# Patient Record
Sex: MALE | Race: WHITE | NOT HISPANIC OR LATINO | Employment: OTHER | URBAN - NONMETROPOLITAN AREA
[De-identification: names, ages, dates, MRNs, and addresses within clinical notes are randomized per-mention and may not be internally consistent; named-entity substitution may affect disease eponyms.]

---

## 2023-02-08 PROBLEM — G47.00 INSOMNIA: Status: ACTIVE | Noted: 2023-02-08

## 2023-02-08 PROBLEM — R42 DIZZINESS: Status: ACTIVE | Noted: 2023-02-08

## 2023-02-08 PROBLEM — E78.2 COMBINED HYPERLIPIDEMIA: Status: ACTIVE | Noted: 2023-02-08

## 2023-02-08 PROBLEM — N40.1 BENIGN PROSTATIC HYPERPLASIA WITH URINARY OBSTRUCTION AND OTHER LOWER URINARY TRACT SYMPTOMS: Status: ACTIVE | Noted: 2023-02-08

## 2023-02-08 PROBLEM — K21.9 ACID REFLUX DISEASE: Status: ACTIVE | Noted: 2023-02-08

## 2023-02-08 PROBLEM — M17.9 DJD (DEGENERATIVE JOINT DISEASE) OF KNEE: Status: ACTIVE | Noted: 2023-02-08

## 2023-02-08 PROBLEM — M19.90 OSTEOARTHRITIS: Status: ACTIVE | Noted: 2023-02-08

## 2023-02-08 PROBLEM — I48.91 AFIB (MULTI): Status: ACTIVE | Noted: 2023-02-08

## 2023-02-08 PROBLEM — M54.50 LEFT LOW BACK PAIN: Status: ACTIVE | Noted: 2023-02-08

## 2023-02-08 PROBLEM — N13.8 BENIGN PROSTATIC HYPERPLASIA WITH URINARY OBSTRUCTION AND OTHER LOWER URINARY TRACT SYMPTOMS: Status: ACTIVE | Noted: 2023-02-08

## 2023-02-08 PROBLEM — I10 BENIGN ESSENTIAL HYPERTENSION: Status: ACTIVE | Noted: 2023-02-08

## 2023-02-08 PROBLEM — N48.6 PEYRONIE'S DISEASE: Status: ACTIVE | Noted: 2023-02-08

## 2023-02-08 PROBLEM — R35.0 URINARY FREQUENCY: Status: ACTIVE | Noted: 2023-02-08

## 2023-02-08 PROBLEM — K22.70 BARRETT'S ESOPHAGUS: Status: ACTIVE | Noted: 2023-02-08

## 2023-02-08 PROBLEM — I10 HYPERTENSION: Status: ACTIVE | Noted: 2023-02-08

## 2023-02-08 PROBLEM — I49.9 ABNORMAL HEART RHYTHM: Status: ACTIVE | Noted: 2023-02-08

## 2023-02-08 PROBLEM — I63.9 CVA (CEREBRAL VASCULAR ACCIDENT) (MULTI): Status: ACTIVE | Noted: 2023-02-08

## 2023-02-08 PROBLEM — M25.519 SHOULDER PAIN: Status: ACTIVE | Noted: 2023-02-08

## 2023-02-08 PROBLEM — R00.2 HEART PALPITATIONS: Status: ACTIVE | Noted: 2023-02-08

## 2023-02-08 PROBLEM — N40.0 ENLARGED PROSTATE: Status: ACTIVE | Noted: 2023-02-08

## 2023-02-08 PROBLEM — R35.1 NOCTURIA: Status: ACTIVE | Noted: 2023-02-08

## 2023-02-08 PROBLEM — E78.5 HYPERLIPIDEMIA: Status: ACTIVE | Noted: 2023-02-08

## 2023-02-08 RX ORDER — ATORVASTATIN CALCIUM 40 MG/1
1 TABLET, FILM COATED ORAL DAILY
COMMUNITY
Start: 2022-11-03 | End: 2023-05-08 | Stop reason: SDUPTHER

## 2023-02-08 RX ORDER — HYDROGEN PEROXIDE 3 %
1 SOLUTION, NON-ORAL MISCELLANEOUS DAILY
Status: ON HOLD | COMMUNITY
End: 2024-01-23 | Stop reason: ENTERED-IN-ERROR

## 2023-02-08 RX ORDER — TAMSULOSIN HYDROCHLORIDE 0.4 MG/1
0.4 CAPSULE ORAL DAILY
COMMUNITY
Start: 2021-05-03 | End: 2024-05-03

## 2023-02-08 RX ORDER — BENZONATATE 100 MG/1
100 CAPSULE ORAL
COMMUNITY
Start: 2022-11-18 | End: 2023-10-08 | Stop reason: ENTERED-IN-ERROR

## 2023-02-08 RX ORDER — LOSARTAN POTASSIUM 50 MG/1
1 TABLET ORAL DAILY
COMMUNITY
End: 2023-06-19 | Stop reason: SDUPTHER

## 2023-02-08 RX ORDER — ASPIRIN 81 MG/1
1 TABLET ORAL DAILY
COMMUNITY
Start: 2022-11-03 | End: 2023-10-08 | Stop reason: ENTERED-IN-ERROR

## 2023-02-08 RX ORDER — KETOCONAZOLE 20 MG/G
1 CREAM TOPICAL 2 TIMES DAILY
COMMUNITY

## 2023-02-08 RX ORDER — KETOCONAZOLE 20 MG/ML
1 SHAMPOO, SUSPENSION TOPICAL
COMMUNITY

## 2023-02-27 LAB — PROSTATE SPECIFIC ANTIGEN,SCREEN: 1.49 NG/ML (ref 0–4)

## 2023-03-28 ENCOUNTER — OFFICE VISIT (OUTPATIENT)
Dept: PRIMARY CARE | Facility: CLINIC | Age: 73
End: 2023-03-28
Payer: MEDICARE

## 2023-03-28 VITALS
OXYGEN SATURATION: 98 % | WEIGHT: 193 LBS | DIASTOLIC BLOOD PRESSURE: 78 MMHG | SYSTOLIC BLOOD PRESSURE: 150 MMHG | BODY MASS INDEX: 27.69 KG/M2 | HEART RATE: 86 BPM

## 2023-03-28 DIAGNOSIS — I63.339 CEREBROVASCULAR ACCIDENT (CVA) DUE TO THROMBOSIS OF POSTERIOR CEREBRAL ARTERY, UNSPECIFIED BLOOD VESSEL LATERALITY (MULTI): Primary | ICD-10-CM

## 2023-03-28 DIAGNOSIS — I10 BENIGN ESSENTIAL HYPERTENSION: ICD-10-CM

## 2023-03-28 DIAGNOSIS — I48.91 ATRIAL FIBRILLATION, UNSPECIFIED TYPE (MULTI): ICD-10-CM

## 2023-03-28 DIAGNOSIS — E78.2 MIXED HYPERLIPIDEMIA: ICD-10-CM

## 2023-03-28 DIAGNOSIS — F41.9 ANXIETY: ICD-10-CM

## 2023-03-28 PROCEDURE — 1157F ADVNC CARE PLAN IN RCRD: CPT | Performed by: FAMILY MEDICINE

## 2023-03-28 PROCEDURE — 99214 OFFICE O/P EST MOD 30 MIN: CPT | Performed by: FAMILY MEDICINE

## 2023-03-28 PROCEDURE — 3078F DIAST BP <80 MM HG: CPT | Performed by: FAMILY MEDICINE

## 2023-03-28 PROCEDURE — 3077F SYST BP >= 140 MM HG: CPT | Performed by: FAMILY MEDICINE

## 2023-03-28 PROCEDURE — 1036F TOBACCO NON-USER: CPT | Performed by: FAMILY MEDICINE

## 2023-03-28 PROCEDURE — 1160F RVW MEDS BY RX/DR IN RCRD: CPT | Performed by: FAMILY MEDICINE

## 2023-03-28 PROCEDURE — 1159F MED LIST DOCD IN RCRD: CPT | Performed by: FAMILY MEDICINE

## 2023-03-28 RX ORDER — SERTRALINE HYDROCHLORIDE 25 MG/1
25 TABLET, FILM COATED ORAL DAILY
Qty: 30 TABLET | Refills: 11 | Status: SHIPPED | OUTPATIENT
Start: 2023-03-28 | End: 2023-04-10 | Stop reason: SDUPTHER

## 2023-03-28 RX ORDER — HYDROCHLOROTHIAZIDE 12.5 MG/1
12.5 TABLET ORAL DAILY
Qty: 30 TABLET | Refills: 5 | Status: SHIPPED | OUTPATIENT
Start: 2023-03-28 | End: 2023-09-18

## 2023-03-28 ASSESSMENT — PATIENT HEALTH QUESTIONNAIRE - PHQ9
1. LITTLE INTEREST OR PLEASURE IN DOING THINGS: NOT AT ALL
2. FEELING DOWN, DEPRESSED OR HOPELESS: NOT AT ALL
SUM OF ALL RESPONSES TO PHQ9 QUESTIONS 1 AND 2: 0

## 2023-03-28 NOTE — PROGRESS NOTES
Subjective   Patient ID: Bonifacio Hernandez is a 72 y.o. male who presents for Hypertension.    HPI as noted his blood pressure above 140 systolic well under 90 diastolic lately.  He has been some stressed.  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  He is able to pay play his guitar and he is otherwise his regular self.  He does have a loop recorder in place to assess for atrial fibs.  His mood has been off and we discussed RBA of SSRI for anxiety/depression  He is not noted any episodes of irregular or racing heartbeat or palpitations  Hyperlipidemia- is on a statin and a prudent diet.    Review of Systems denies chest pains palpitations cough shortness of breath heartburn or abdominal pain.    Objective   /78   Pulse 86   Wt 87.5 kg (193 lb)   SpO2 98%   BMI 27.69 kg/m²     Physical Exam no bruit heart is regular without murmur lungs are clear no edema in extremities    Assessment/Plan   Problem List Items Addressed This Visit          Nervous    CVA (cerebral vascular accident) (CMS/HCC) - Primary       Circulatory    Afib (CMS/[facility])    Benign essential hypertension    Relevant Medications    hydroCHLOROthiazide (HYDRODiuril) 12.5 mg tablet       Other    Hyperlipidemia     Other Visit Diagnoses       Anxiety        Relevant Medications    sertraline (Zoloft) 25 mg tablet        Agrees to begin sertraline 25 mg daily for anxiety  Add hydrochlorothiazide 12.5 daily to reduce systolic blood pressure  Keep April appointment to follow-up on both above and we will assess laboratory need at that time

## 2023-04-10 ENCOUNTER — OFFICE VISIT (OUTPATIENT)
Dept: PRIMARY CARE | Facility: CLINIC | Age: 73
End: 2023-04-10
Payer: MEDICARE

## 2023-04-10 VITALS
OXYGEN SATURATION: 97 % | WEIGHT: 188.6 LBS | DIASTOLIC BLOOD PRESSURE: 60 MMHG | HEART RATE: 72 BPM | HEIGHT: 70 IN | BODY MASS INDEX: 27 KG/M2 | SYSTOLIC BLOOD PRESSURE: 120 MMHG

## 2023-04-10 DIAGNOSIS — I63.339 CEREBROVASCULAR ACCIDENT (CVA) DUE TO THROMBOSIS OF POSTERIOR CEREBRAL ARTERY, UNSPECIFIED BLOOD VESSEL LATERALITY (MULTI): ICD-10-CM

## 2023-04-10 DIAGNOSIS — E78.2 COMBINED HYPERLIPIDEMIA: ICD-10-CM

## 2023-04-10 DIAGNOSIS — I48.0 PAROXYSMAL ATRIAL FIBRILLATION (MULTI): Primary | ICD-10-CM

## 2023-04-10 DIAGNOSIS — F41.9 ANXIETY: ICD-10-CM

## 2023-04-10 DIAGNOSIS — I10 BENIGN ESSENTIAL HYPERTENSION: ICD-10-CM

## 2023-04-10 LAB
ANION GAP IN SER/PLAS: 10 MMOL/L (ref 10–20)
BASOPHILS (10*3/UL) IN BLOOD BY AUTOMATED COUNT: 0.08 X10E9/L (ref 0–0.1)
BASOPHILS/100 LEUKOCYTES IN BLOOD BY AUTOMATED COUNT: 0.7 % (ref 0–2)
CALCIUM (MG/DL) IN SER/PLAS: 9.6 MG/DL (ref 8.6–10.3)
CARBON DIOXIDE, TOTAL (MMOL/L) IN SER/PLAS: 28 MMOL/L (ref 21–32)
CHLORIDE (MMOL/L) IN SER/PLAS: 101 MMOL/L (ref 98–107)
CREATININE (MG/DL) IN SER/PLAS: 0.93 MG/DL (ref 0.5–1.3)
EOSINOPHILS (10*3/UL) IN BLOOD BY AUTOMATED COUNT: 0.25 X10E9/L (ref 0–0.4)
EOSINOPHILS/100 LEUKOCYTES IN BLOOD BY AUTOMATED COUNT: 2.3 % (ref 0–6)
ERYTHROCYTE DISTRIBUTION WIDTH (RATIO) BY AUTOMATED COUNT: 12.4 % (ref 11.5–14.5)
ERYTHROCYTE MEAN CORPUSCULAR HEMOGLOBIN CONCENTRATION (G/DL) BY AUTOMATED: 32.9 G/DL (ref 32–36)
ERYTHROCYTE MEAN CORPUSCULAR VOLUME (FL) BY AUTOMATED COUNT: 94 FL (ref 80–100)
ERYTHROCYTES (10*6/UL) IN BLOOD BY AUTOMATED COUNT: 5.14 X10E12/L (ref 4.5–5.9)
GFR MALE: 86 ML/MIN/1.73M2
GLUCOSE (MG/DL) IN SER/PLAS: 105 MG/DL (ref 74–99)
HEMATOCRIT (%) IN BLOOD BY AUTOMATED COUNT: 48.3 % (ref 41–52)
HEMOGLOBIN (G/DL) IN BLOOD: 15.9 G/DL (ref 13.5–17.5)
IMMATURE GRANULOCYTES/100 LEUKOCYTES IN BLOOD BY AUTOMATED COUNT: 0.5 % (ref 0–0.9)
LEUKOCYTES (10*3/UL) IN BLOOD BY AUTOMATED COUNT: 10.9 X10E9/L (ref 4.4–11.3)
LYMPHOCYTES (10*3/UL) IN BLOOD BY AUTOMATED COUNT: 1.47 X10E9/L (ref 0.8–3)
LYMPHOCYTES/100 LEUKOCYTES IN BLOOD BY AUTOMATED COUNT: 13.5 % (ref 13–44)
MONOCYTES (10*3/UL) IN BLOOD BY AUTOMATED COUNT: 1.17 X10E9/L (ref 0.05–0.8)
MONOCYTES/100 LEUKOCYTES IN BLOOD BY AUTOMATED COUNT: 10.7 % (ref 2–10)
NEUTROPHILS (10*3/UL) IN BLOOD BY AUTOMATED COUNT: 7.9 X10E9/L (ref 1.6–5.5)
NEUTROPHILS/100 LEUKOCYTES IN BLOOD BY AUTOMATED COUNT: 72.3 % (ref 40–80)
PLATELETS (10*3/UL) IN BLOOD AUTOMATED COUNT: 244 X10E9/L (ref 150–450)
POTASSIUM (MMOL/L) IN SER/PLAS: 4.3 MMOL/L (ref 3.5–5.3)
SODIUM (MMOL/L) IN SER/PLAS: 135 MMOL/L (ref 136–145)
UREA NITROGEN (MG/DL) IN SER/PLAS: 16 MG/DL (ref 6–23)

## 2023-04-10 PROCEDURE — 3074F SYST BP LT 130 MM HG: CPT | Performed by: FAMILY MEDICINE

## 2023-04-10 PROCEDURE — 99214 OFFICE O/P EST MOD 30 MIN: CPT | Performed by: FAMILY MEDICINE

## 2023-04-10 PROCEDURE — 1159F MED LIST DOCD IN RCRD: CPT | Performed by: FAMILY MEDICINE

## 2023-04-10 PROCEDURE — 3078F DIAST BP <80 MM HG: CPT | Performed by: FAMILY MEDICINE

## 2023-04-10 PROCEDURE — 1036F TOBACCO NON-USER: CPT | Performed by: FAMILY MEDICINE

## 2023-04-10 PROCEDURE — 1157F ADVNC CARE PLAN IN RCRD: CPT | Performed by: FAMILY MEDICINE

## 2023-04-10 PROCEDURE — 1160F RVW MEDS BY RX/DR IN RCRD: CPT | Performed by: FAMILY MEDICINE

## 2023-04-10 RX ORDER — SERTRALINE HYDROCHLORIDE 50 MG/1
50 TABLET, FILM COATED ORAL DAILY
Qty: 30 TABLET | Refills: 11 | Status: SHIPPED | OUTPATIENT
Start: 2023-04-10 | End: 2024-04-05 | Stop reason: SDUPTHER

## 2023-04-10 NOTE — PROGRESS NOTES
"Subjective   Patient ID: Bonifacio Hernandez is a 72 y.o. male who presents for Follow-up (Check med).    HPI   Anxiety seems better after 2 weeks per pt, wife woth no diff, so inc to 50mg as meds are tolerated without nausea  No paf episodes noted.  Cva stable ?pba?  As he has some emotional lability suggested by wife the patient denies  HTN-Takes and tolerates meds without side effects. No alcohol. no tobacco. no exercise. low salt.  Reviewed recommendation for 150 minutes of exercise per week including 2 days of weight training if over age 50.  Tolerates hydrochlorothiazide with normotensive response we will check BMP today  Hyperlipidemia- is on a statin and a prudent diet.      Review of Systems  General-no fatigue weight to within 10 pounds  ENT no problems with vision swallowing  Cardiac no chest pains palpitations change in exercise tolerance or capacity  Pulmonary no cough shortness of breath  GI no heartburn or abdominal pain  Musculoskeletal no joint pains  Objective   /60   Pulse 72   Ht 1.778 m (5' 10\")   Wt 85.5 kg (188 lb 9.6 oz)   SpO2 97%   BMI 27.06 kg/m²     Physical Exam general:  Alert, No acute distress. Appears stated age  Eye:  Pupils are equal, round and reactive to light, Extraocular movements are intact, Normal conjunctiva.    Neck:  Supple, Non-tender, No carotid bruit, No jugular venous distention, No lymphadenopathy, No thyromegaly.    Respiratory:  Lungs are clear to auscultation, Respirations are non-labored, Breath sounds are equal.    Cardiovascular:  Normal rate, Regular rhythm, No murmur.    Gastrointestinal:  Soft, Non-tender, No organomegaly. No solid or pulsatile mass  Integumentary:  Warm, Dry. No concerning lesions on exposed areas  Neurologic:  Alert, Oriented.  Gross and fine motor intact, CN 2-12 intact  Psychiatric:  Cooperative, Appropriate mood & affect.    Assessment/Plan   Problem List Items Addressed This Visit          Nervous    CVA (cerebral vascular accident) " (CMS/HCC)       Circulatory    Afib (CMS/HCC) - Primary    Relevant Orders    Basic metabolic panel    Follow Up In Primary Care    Benign essential hypertension       Other    Combined hyperlipidemia     Other Visit Diagnoses       Anxiety        Relevant Medications    sertraline (Zoloft) 50 mg tablet    Other Relevant Orders    Follow Up In Primary Care

## 2023-05-08 DIAGNOSIS — E78.2 MIXED HYPERLIPIDEMIA: ICD-10-CM

## 2023-05-08 RX ORDER — ATORVASTATIN CALCIUM 40 MG/1
40 TABLET, FILM COATED ORAL DAILY
Qty: 90 TABLET | Refills: 3 | Status: SHIPPED | OUTPATIENT
Start: 2023-05-08 | End: 2024-05-21 | Stop reason: SDUPTHER

## 2023-06-19 DIAGNOSIS — I10 BENIGN ESSENTIAL HYPERTENSION: ICD-10-CM

## 2023-06-19 RX ORDER — LOSARTAN POTASSIUM 50 MG/1
50 TABLET ORAL DAILY
Qty: 30 TABLET | Refills: 0 | Status: SHIPPED | OUTPATIENT
Start: 2023-06-19 | End: 2023-07-14

## 2023-07-10 ENCOUNTER — OFFICE VISIT (OUTPATIENT)
Dept: PRIMARY CARE | Facility: CLINIC | Age: 73
End: 2023-07-10
Payer: MEDICARE

## 2023-07-10 VITALS
HEIGHT: 70 IN | WEIGHT: 184.6 LBS | HEART RATE: 71 BPM | BODY MASS INDEX: 26.43 KG/M2 | OXYGEN SATURATION: 97 % | SYSTOLIC BLOOD PRESSURE: 120 MMHG | DIASTOLIC BLOOD PRESSURE: 58 MMHG

## 2023-07-10 DIAGNOSIS — I63.339 CEREBROVASCULAR ACCIDENT (CVA) DUE TO THROMBOSIS OF POSTERIOR CEREBRAL ARTERY, UNSPECIFIED BLOOD VESSEL LATERALITY (MULTI): ICD-10-CM

## 2023-07-10 DIAGNOSIS — F41.9 ANXIETY: ICD-10-CM

## 2023-07-10 DIAGNOSIS — E78.2 MIXED HYPERLIPIDEMIA: ICD-10-CM

## 2023-07-10 DIAGNOSIS — K21.9 GASTROESOPHAGEAL REFLUX DISEASE WITHOUT ESOPHAGITIS: ICD-10-CM

## 2023-07-10 DIAGNOSIS — I10 BENIGN ESSENTIAL HYPERTENSION: Primary | ICD-10-CM

## 2023-07-10 DIAGNOSIS — I48.0 PAROXYSMAL ATRIAL FIBRILLATION (MULTI): ICD-10-CM

## 2023-07-10 PROCEDURE — 3078F DIAST BP <80 MM HG: CPT | Performed by: FAMILY MEDICINE

## 2023-07-10 PROCEDURE — 99214 OFFICE O/P EST MOD 30 MIN: CPT | Performed by: FAMILY MEDICINE

## 2023-07-10 PROCEDURE — 1036F TOBACCO NON-USER: CPT | Performed by: FAMILY MEDICINE

## 2023-07-10 PROCEDURE — 1159F MED LIST DOCD IN RCRD: CPT | Performed by: FAMILY MEDICINE

## 2023-07-10 PROCEDURE — 3074F SYST BP LT 130 MM HG: CPT | Performed by: FAMILY MEDICINE

## 2023-07-10 PROCEDURE — 1157F ADVNC CARE PLAN IN RCRD: CPT | Performed by: FAMILY MEDICINE

## 2023-07-10 PROCEDURE — 1160F RVW MEDS BY RX/DR IN RCRD: CPT | Performed by: FAMILY MEDICINE

## 2023-07-10 NOTE — PROGRESS NOTES
"Subjective   Patient ID: Bonifacio Hernandez is a 73 y.o. male who presents for Follow-up (3 mo).    HPI   Anxiety- anxiety is resollved. Npassed drivers test.  PAF- no episodes noted  HTN-Takes and tolerates meds without side effects. No alcohol. no tobacco. no exercise. low salt.  Reviewed recommendation for 150 minutes of exercise per week including 2 days of weight training if over age 50  Hyperlipidemia- is on a statin and a prudent diet.  GERD-Takes PPI daily with no breakthrough symptoms.  Reviewed dietary, caffeine, tobacco, alcohol, and NSAID avoidance. No dyspepsia, dysphagia, reflux, melena, or abdominal pain.  Cva no residua effect.    Review of Systems  General-no fatigue weight to within 10 pounds  ENT no problems with vision swallowing  Cardiac no chest pains palpitations change in exercise tolerance or capacity  Pulmonary no cough shortness of breath  GI no heartburn or abdominal pain  Musculoskeletal right knee joint pains    Objective   /58   Pulse 71   Ht 1.778 m (5' 10\")   Wt 83.7 kg (184 lb 9.6 oz)   SpO2 97%   BMI 26.49 kg/m²     Physical Exam  General:  Alert, No acute distress. Appears stated age  Eye:  Pupils are equal, round and reactive to light, Extraocular movements are intact, Normal conjunctiva.    Neck:  Supple, Non-tender, No carotid bruit, No jugular venous distention, No lymphadenopathy, No thyromegaly.    Respiratory:  Lungs are clear to auscultation, Respirations are non-labored, Breath sounds are equal.    Cardiovascular:  Normal rate, Regular rhythm, No murmur.    Gastrointestinal:  Soft, Non-tender, No organomegaly. No solid or pulsatile mass  Integumentary:  Warm, Dry. No concerning lesions on exposed areas  Neurologic:  Alert, Oriented.  Gross and fine motor intact, CN 2-12 intact  Psychiatric:  Cooperative, Appropriate mood & affect.    Assessment/Plan   Problem List Items Addressed This Visit          Cardiac and Vasculature    Afib (CMS/[facility])    Relevant Orders    " Follow Up In Primary Care - Established    Benign essential hypertension - Primary    Relevant Orders    CBC    Comprehensive Metabolic Panel    Follow Up In Primary Care - Established    Hyperlipidemia    Relevant Orders    CBC    Comprehensive Metabolic Panel    Lipid Panel    Follow Up In Primary Care - Established       Gastrointestinal and Abdominal    Acid reflux disease    Relevant Orders    Follow Up In Primary Care - Established       Neuro    CVA (cerebral vascular accident) (CMS/HCC)    Relevant Orders    Follow Up In Primary Care - Established     Other Visit Diagnoses       Anxiety        Relevant Orders    Follow Up In Primary Care - Established

## 2023-07-12 DIAGNOSIS — I10 BENIGN ESSENTIAL HYPERTENSION: ICD-10-CM

## 2023-07-14 RX ORDER — LOSARTAN POTASSIUM 50 MG/1
50 TABLET ORAL DAILY
Qty: 90 TABLET | Refills: 3 | Status: SHIPPED | OUTPATIENT
Start: 2023-07-14 | End: 2024-07-13

## 2023-09-05 DIAGNOSIS — I10 BENIGN ESSENTIAL HYPERTENSION: ICD-10-CM

## 2023-09-18 RX ORDER — HYDROCHLOROTHIAZIDE 12.5 MG/1
12.5 TABLET ORAL DAILY
Qty: 90 TABLET | Refills: 3 | Status: SHIPPED | OUTPATIENT
Start: 2023-09-18 | End: 2023-09-19 | Stop reason: SDUPTHER

## 2023-09-19 DIAGNOSIS — I10 BENIGN ESSENTIAL HYPERTENSION: ICD-10-CM

## 2023-09-19 RX ORDER — HYDROCHLOROTHIAZIDE 12.5 MG/1
12.5 TABLET ORAL DAILY
Qty: 90 TABLET | Refills: 3 | Status: SHIPPED | OUTPATIENT
Start: 2023-09-19 | End: 2024-09-18

## 2023-10-08 ENCOUNTER — APPOINTMENT (OUTPATIENT)
Dept: RADIOLOGY | Facility: HOSPITAL | Age: 73
End: 2023-10-08
Payer: MEDICARE

## 2023-10-08 ENCOUNTER — HOSPITAL ENCOUNTER (EMERGENCY)
Facility: HOSPITAL | Age: 73
Discharge: HOME | End: 2023-10-08
Attending: EMERGENCY MEDICINE
Payer: MEDICARE

## 2023-10-08 VITALS
OXYGEN SATURATION: 98 % | SYSTOLIC BLOOD PRESSURE: 133 MMHG | WEIGHT: 184 LBS | BODY MASS INDEX: 26.34 KG/M2 | HEART RATE: 59 BPM | RESPIRATION RATE: 16 BRPM | TEMPERATURE: 96.7 F | HEIGHT: 70 IN | DIASTOLIC BLOOD PRESSURE: 75 MMHG

## 2023-10-08 DIAGNOSIS — S61.012A THUMB LACERATION, LEFT, INITIAL ENCOUNTER: ICD-10-CM

## 2023-10-08 DIAGNOSIS — S02.2XXA CLOSED FRACTURE OF NASAL BONE, INITIAL ENCOUNTER: ICD-10-CM

## 2023-10-08 DIAGNOSIS — S09.90XA HEAD INJURY, INITIAL ENCOUNTER: ICD-10-CM

## 2023-10-08 DIAGNOSIS — R42 DIZZINESS: Primary | ICD-10-CM
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
## 2023-10-08 LAB
ALBUMIN SERPL BCP-MCNC: 4.1 G/DL (ref 3.4–5)
ALP SERPL-CCNC: 51 U/L (ref 33–136)
ALT SERPL W P-5'-P-CCNC: 27 U/L (ref 10–52)
ANION GAP SERPL CALC-SCNC: 11 MMOL/L (ref 10–20)
AST SERPL W P-5'-P-CCNC: 27 U/L (ref 9–39)
BASOPHILS # BLD AUTO: 0.04 X10*3/UL (ref 0–0.1)
BASOPHILS NFR BLD AUTO: 0.4 %
BILIRUB SERPL-MCNC: 0.5 MG/DL (ref 0–1.2)
BUN SERPL-MCNC: 12 MG/DL (ref 6–23)
CALCIUM SERPL-MCNC: 9.5 MG/DL (ref 8.6–10.3)
CARDIAC TROPONIN I PNL SERPL HS: 6 NG/L (ref 0–20)
CARDIAC TROPONIN I PNL SERPL HS: 6 NG/L (ref 0–20)
CHLORIDE SERPL-SCNC: 100 MMOL/L (ref 98–107)
CO2 SERPL-SCNC: 27 MMOL/L (ref 21–32)
CREAT SERPL-MCNC: 0.76 MG/DL (ref 0.5–1.3)
EOSINOPHIL # BLD AUTO: 0.1 X10*3/UL (ref 0–0.4)
EOSINOPHIL NFR BLD AUTO: 1 %
ERYTHROCYTE [DISTWIDTH] IN BLOOD BY AUTOMATED COUNT: 12.6 % (ref 11.5–14.5)
GFR SERPL CREATININE-BSD FRML MDRD: >90 ML/MIN/1.73M*2
GLUCOSE SERPL-MCNC: 132 MG/DL (ref 74–99)
HCT VFR BLD AUTO: 44 % (ref 41–52)
HGB BLD-MCNC: 14.5 G/DL (ref 13.5–17.5)
IMM GRANULOCYTES # BLD AUTO: 0.04 X10*3/UL (ref 0–0.5)
IMM GRANULOCYTES NFR BLD AUTO: 0.4 % (ref 0–0.9)
LYMPHOCYTES # BLD AUTO: 0.8 X10*3/UL (ref 0.8–3)
LYMPHOCYTES NFR BLD AUTO: 7.9 %
MCH RBC QN AUTO: 30.1 PG (ref 26–34)
MCHC RBC AUTO-ENTMCNC: 33 G/DL (ref 32–36)
MCV RBC AUTO: 91 FL (ref 80–100)
MONOCYTES # BLD AUTO: 0.79 X10*3/UL (ref 0.05–0.8)
MONOCYTES NFR BLD AUTO: 7.8 %
NEUTROPHILS # BLD AUTO: 8.4 X10*3/UL (ref 1.6–5.5)
NEUTROPHILS NFR BLD AUTO: 82.5 %
NRBC BLD-RTO: 0 /100 WBCS (ref 0–0)
PLATELET # BLD AUTO: 217 X10*3/UL (ref 150–450)
PMV BLD AUTO: 9.8 FL (ref 7.5–11.5)
POTASSIUM SERPL-SCNC: 3.9 MMOL/L (ref 3.5–5.3)
PROT SERPL-MCNC: 6.7 G/DL (ref 6.4–8.2)
RBC # BLD AUTO: 4.82 X10*6/UL (ref 4.5–5.9)
SODIUM SERPL-SCNC: 134 MMOL/L (ref 136–145)
WBC # BLD AUTO: 10.2 X10*3/UL (ref 4.4–11.3)

## 2023-10-08 PROCEDURE — 76377 3D RENDER W/INTRP POSTPROCES: CPT | Performed by: RADIOLOGY

## 2023-10-08 PROCEDURE — 90471 IMMUNIZATION ADMIN: CPT | Performed by: EMERGENCY MEDICINE

## 2023-10-08 PROCEDURE — 72125 CT NECK SPINE W/O DYE: CPT | Performed by: RADIOLOGY

## 2023-10-08 PROCEDURE — 90715 TDAP VACCINE 7 YRS/> IM: CPT | Performed by: EMERGENCY MEDICINE

## 2023-10-08 PROCEDURE — 70450 CT HEAD/BRAIN W/O DYE: CPT | Mod: ME

## 2023-10-08 PROCEDURE — 84484 ASSAY OF TROPONIN QUANT: CPT | Mod: 59 | Performed by: NURSE PRACTITIONER

## 2023-10-08 PROCEDURE — G1004 CDSM NDSC: HCPCS

## 2023-10-08 PROCEDURE — 70450 CT HEAD/BRAIN W/O DYE: CPT | Performed by: RADIOLOGY

## 2023-10-08 PROCEDURE — 99285 EMERGENCY DEPT VISIT HI MDM: CPT | Mod: 25 | Performed by: EMERGENCY MEDICINE

## 2023-10-08 PROCEDURE — 36415 COLL VENOUS BLD VENIPUNCTURE: CPT | Performed by: NURSE PRACTITIONER

## 2023-10-08 PROCEDURE — 2500000004 HC RX 250 GENERAL PHARMACY W/ HCPCS (ALT 636 FOR OP/ED): Performed by: EMERGENCY MEDICINE

## 2023-10-08 PROCEDURE — 70486 CT MAXILLOFACIAL W/O DYE: CPT | Mod: MG

## 2023-10-08 PROCEDURE — 71046 X-RAY EXAM CHEST 2 VIEWS: CPT | Performed by: RADIOLOGY

## 2023-10-08 PROCEDURE — 93005 ELECTROCARDIOGRAM TRACING: CPT

## 2023-10-08 PROCEDURE — 84484 ASSAY OF TROPONIN QUANT: CPT | Performed by: NURSE PRACTITIONER

## 2023-10-08 PROCEDURE — 85025 COMPLETE CBC W/AUTO DIFF WBC: CPT | Performed by: NURSE PRACTITIONER

## 2023-10-08 PROCEDURE — 80053 COMPREHEN METABOLIC PANEL: CPT | Performed by: NURSE PRACTITIONER

## 2023-10-08 PROCEDURE — 70486 CT MAXILLOFACIAL W/O DYE: CPT | Performed by: RADIOLOGY

## 2023-10-08 PROCEDURE — 71046 X-RAY EXAM CHEST 2 VIEWS: CPT

## 2023-10-08 RX ORDER — LATANOPROST 50 UG/ML
1 SOLUTION/ DROPS OPHTHALMIC NIGHTLY
COMMUNITY

## 2023-10-08 RX ORDER — MULTIVITAMIN
1 TABLET ORAL DAILY
Status: ON HOLD | COMMUNITY
End: 2024-01-23 | Stop reason: ENTERED-IN-ERROR

## 2023-10-08 RX ORDER — ACETAMINOPHEN 500 MG
2 TABLET ORAL DAILY
Status: ON HOLD | COMMUNITY
End: 2024-01-23 | Stop reason: ENTERED-IN-ERROR

## 2023-10-08 RX ORDER — AMOXICILLIN 500 MG/1
500 CAPSULE ORAL 3 TIMES DAILY
Qty: 15 CAPSULE | Refills: 0 | Status: SHIPPED | OUTPATIENT
Start: 2023-10-08 | End: 2023-10-20

## 2023-10-08 RX ADMIN — TETANUS TOXOID, REDUCED DIPHTHERIA TOXOID AND ACELLULAR PERTUSSIS VACCINE, ADSORBED 0.5 ML: 5; 2.5; 8; 8; 2.5 SUSPENSION INTRAMUSCULAR at 19:20

## 2023-10-08 ASSESSMENT — PAIN SCALES - GENERAL
PAINLEVEL_OUTOF10: 0 - NO PAIN

## 2023-10-08 ASSESSMENT — PAIN - FUNCTIONAL ASSESSMENT
PAIN_FUNCTIONAL_ASSESSMENT: 0-10
PAIN_FUNCTIONAL_ASSESSMENT: 0-10

## 2023-10-08 ASSESSMENT — COLUMBIA-SUICIDE SEVERITY RATING SCALE - C-SSRS
2. HAVE YOU ACTUALLY HAD ANY THOUGHTS OF KILLING YOURSELF?: NO
1. IN THE PAST MONTH, HAVE YOU WISHED YOU WERE DEAD OR WISHED YOU COULD GO TO SLEEP AND NOT WAKE UP?: NO
6. HAVE YOU EVER DONE ANYTHING, STARTED TO DO ANYTHING, OR PREPARED TO DO ANYTHING TO END YOUR LIFE?: NO

## 2023-10-08 NOTE — ED NOTES
Small abrasion noted to top of nose. Patient reports this is from his fall. Bleeding controlled.      Jane Arzola RN  10/08/23 [id number]

## 2023-10-08 NOTE — ED PROVIDER NOTES
Chief Complaint   Patient presents with    Fall     Pt was feeling dizzy today and he went to get up out of the chair when he became more dizzy and nauseous. Fell and hit head on the floor. Small laceration to bridge of nose. Bleeding controlled at this time. Denies LOC. Pt on Eliquis. Hx of ischemic stroke about 1 yr ago.         Patient History    Past Medical History:   Diagnosis Date    Ischemic stroke (CMS/HCC)     Personal history of other diseases of the circulatory system 09/02/2021    History of cardiac arrhythmia      Past Surgical History:   Procedure Laterality Date    OTHER SURGICAL HISTORY  09/16/2019    Varicose vein ligation    OTHER SURGICAL HISTORY  09/16/2019    Inguinal hernia repair    OTHER SURGICAL HISTORY  09/16/2019    Esophagogastroduodenoscopy    OTHER SURGICAL HISTORY  09/16/2019    Knee replacement    OTHER SURGICAL HISTORY  10/15/2019    Back surgery    OTHER SURGICAL HISTORY  08/28/2020    Colonoscopy    OTHER SURGICAL HISTORY  01/2023    Loop monitor      Family History   Problem Relation Name Age of Onset    Emphysema Father      Hypertension Sister      Pulmonary embolism Sister        Social History     Social History Narrative    Not on file      Allergies   Allergen Reactions    Adhesive Tape-Silicones Other    Bee Pollen Other    Lisinopril Cough        PMH: Reviewed  PSH: Reviewed  Social History: Reviewed.   Allergies reviewed.     HPI: This is a 73 year old male with history of stroke, arrhythmia on Eliquis, who presents to the ED today accompanied by his wife with complaints of fall, facial laceration.  He states he was sitting in his chair at home.  He started to feel nauseated and somewhat dizzy so he felt like he needed to stand up.  When he stood up, he got very dizzy and fell forward.  He was wearing glasses, hit his face on the floor, possibly on a side table, causing laceration on the bridge of his nose.  He denies LOC.  His wife was in the room with him and also  denies LOC.  He complains of no pain at this time.  Denies any chest pain or palpitations prior to or after the fall.  Does not recall his last tetanus update.      REVIEW OF SYSTEMS:  All other systems reviewed and negative except as listed in HPI.    PHYSICAL EXAM:    GENERAL: Vitals noted, no distress. Alert and oriented x 3. Non-toxic.      EENT: TMs clear, no hemotympanum, jeffers sign, raccoon eyes. Posterior oropharynx unremarkable. EOMI, no nystagmus noted.  Laceration on the nasal bridge.  Facial bones are nontender to palpation.    NECK: Supple. No masses. No midline tenderness. No meningeal signs.     CARDIAC: Regular rate, rhythm. No murmurs rubs or gallops. No JVD.    PULMONARY: Lungs clear and equal bilaterally. No wheezes rales or rhonchi. No respiratory distress.     ABDOMEN: Soft, nondistended, and nontender. No peritoneal signs. Bowel sounds are present and normoactive in all 4 quadrants. No pulsatile masses.     EXTREMITIES: No peripheral edema.     SKIN: No rash. Warm, dry, and intact. Nasal bridge laceration 2.5cm. Left thumb laceration 2cm.     NEURO: No focal neurologic deficits.     Labs Reviewed   CBC WITH AUTO DIFFERENTIAL - Abnormal       Result Value    WBC 10.2      nRBC 0.0      RBC 4.82      Hemoglobin 14.5      Hematocrit 44.0      MCV 91      MCH 30.1      MCHC 33.0      RDW 12.6      Platelets 217      MPV 9.8      Neutrophils % 82.5      Immature Granulocytes %, Automated 0.4      Lymphocytes % 7.9      Monocytes % 7.8      Eosinophils % 1.0      Basophils % 0.4      Neutrophils Absolute 8.40 (*)     Immature Granulocytes Absolute, Automated 0.04      Lymphocytes Absolute 0.80      Monocytes Absolute 0.79      Eosinophils Absolute 0.10      Basophils Absolute 0.04     COMPREHENSIVE METABOLIC PANEL - Abnormal    Glucose 132 (*)     Sodium 134 (*)     Potassium 3.9      Chloride 100      Bicarbonate 27      Anion Gap 11      Urea Nitrogen 12      Creatinine 0.76      eGFR >90       Calcium 9.5      Albumin 4.1      Alkaline Phosphatase 51      Total Protein 6.7      AST 27      Bilirubin, Total 0.5      ALT 27     SERIAL TROPONIN-INITIAL - Normal    Troponin I, High Sensitivity 6      Narrative:     Less than 99th percentile of normal range cutoff-  Female and children under 18 years old <14 ng/L; Male <21 ng/L: Negative  Repeat testing should be performed if clinically indicated.     Female and children under 18 years old 14-50 ng/L; Male 21-50 ng/L:  Consistent with possible cardiac damage and possible increased clinical   risk. Serial measurements may help to assess extent of myocardial damage.     >50 ng/L: Consistent with cardiac damage, increased clinical risk and  myocardial infarction. Serial measurements may help assess extent of   myocardial damage.      NOTE: Children less than 1 year old may have higher baseline troponin   levels and results should be interpreted in conjunction with the overall   clinical context.     NOTE: Troponin I testing is performed using a different   testing methodology at [facility] than at [facility]. Direct result comparisons should only   be made within the same method.   SERIAL TROPONIN, 1 HOUR - Normal    Troponin I, High Sensitivity 6      Narrative:     Less than 99th percentile of normal range cutoff-  Female and children under 18 years old <14 ng/L; Male <21 ng/L: Negative  Repeat testing should be performed if clinically indicated.     Female and children under 18 years old 14-50 ng/L; Male 21-50 ng/L:  Consistent with possible cardiac damage and possible increased clinical   risk. Serial measurements may help to assess extent of myocardial damage.     >50 ng/L: Consistent with cardiac damage, increased clinical risk and  myocardial infarction. Serial measurements may help assess extent of   myocardial damage.      NOTE: Children less than 1 year old may have higher baseline troponin   levels and results should be  interpreted in conjunction with the overall   clinical context.     NOTE: Troponin I testing is performed using a different   testing methodology at [facility] than at other   [facility]. Direct result comparisons should only   be made within the same method.   TROPONIN SERIES- (INITIAL, 1 HR)    Narrative:     The following orders were created for panel order Troponin I Series, High Sensitivity (0, 1 HR).  Procedure                               Abnormality         Status                     ---------                               -----------         ------                     Troponin I, High Sensiti...[[id number]]  Normal              Final result               Troponin, High Sensitivi...[[id number]]  Normal              Final result                 Please view results for these tests on the individual orders.        CT cervical spine wo IV contrast   Final Result   Advanced arthritis. No acute fracture.        MACRO:   None        Signed by: Ugo Cabral 10/8/2023 5:55 PM   Dictation workstation:   [id number]      CT maxillofacial bones wo IV contrast   Final Result   Fractures of the right and left nasal bones.        MACRO:   None        Signed by: Ugo Cabral 10/8/2023 6:08 PM   Dictation workstation:   [id number]      CT head wo IV contrast   Final Result   No acute findings. Encephalomalacia        Signed by: Ugo Cabral 10/8/2023 5:50 PM   Dictation workstation:   [id number]      XR chest 2 views   Final Result   1.  Linear atelectasis left lung base. Consider decubitus views of   the abdomen if the patient has abdominal pain                  MACRO:   None        Signed by: Ugo Cabral 10/8/2023 6:14 PM   Dictation workstation:   [id number]      CT 3D reconstruction    (Results Pending)        Medical Decision Making  Amount and/or Complexity of Data Reviewed  Labs: ordered. Decision-making details documented in ED Course.  Radiology: ordered. Decision-making  details documented in ED Course.  ECG/medicine tests:  Decision-making details documented in ED Course.    EKG interpreted by myself and confirmed by ED attending shows SB with rate of 59. Normal axis. UT interval 160. QRS interval 96. QT interval 454. QTc interval 449. No acute ischemia or injury pattern.       ED COURSE: This patient was seen and examined by myself and Dr. Coburn.  Patient was placed on a cardiac monitor with history of dizziness prior to his fall.  He declines tetanus update today initially then changed his mind and it was ordered.     He was set for suture repair. His face is draped in a sterile fashion and the site was cleansed using surgical scrub. He was anesthetized with 1% lidocaine. The wound was copiously irrigated using normal saline. It was explored under a bloodless field. No foreign bodies, no tendon or bony involvement are noted. He had a total of 5 simple interrupted sutures placed using 6.0 ethilon. The wound edges are well approximated post-closure. He has bacitracin/adaptic/gauze dressing applied. He is educated on scar formation and wound care. Sutures are to come out in 5 days by primary care or by returning to the ED. Pt tolerated the suturing well.     He was then set for suture repair of the left thumb. His left thumb is draped in a sterile fashion and the site was cleansed using surgical scrub. He was anesthetized with 1% lidocaine. The wound was copiously irrigated using normal saline. It was explored under a bloodless field. No foreign bodies, no tendon or bony involvement are noted. He had a total of 3 simple interrupted sutures placed using 6.0 ethilon. The wound edges are well approximated post-closure. He has bacitracin/adaptic/gauze dressing applied. He is educated on scar formation and wound care. Sutures are to come out in 7-10 days by primary care or by returning to the ED. Pt tolerated the suturing well.     Started on amoxicillin for nasal fracture and referred  to ENT. Recommended OTC tylenol for pain control as needed. He is discharged home in a stable condition with computer instructions given and is encouraged to return to the ER for any new or worsening symptoms.                  Differential Diagnoses Considered: Head bleed, skull fracture, nasal fracture    Chronic Medical Conditions Significantly Affecting Care: see above      Diagnostic testing considered: blood, cxr, EKG, ct head/facial bones/c-spine    Escalation of Care: Appropriate for outpatient management    Prescription Drug Consideration: amoxicillin        DIAGNOSTIC IMPRESSION: #1 fall #2 nasal fracture #3 facial laceration #4 left thumb laceration     Anali Vera, LOLA-YOLANDA  10/08/23 2004

## 2023-10-09 ENCOUNTER — APPOINTMENT (OUTPATIENT)
Dept: PRIMARY CARE | Facility: CLINIC | Age: 73
End: 2023-10-09
Payer: MEDICARE

## 2023-10-16 ENCOUNTER — APPOINTMENT (OUTPATIENT)
Dept: PRIMARY CARE | Facility: CLINIC | Age: 73
End: 2023-10-16
Payer: MEDICARE

## 2023-10-16 DIAGNOSIS — I48.91 ATRIAL FIBRILLATION, UNSPECIFIED TYPE (MULTI): ICD-10-CM

## 2023-10-19 NOTE — PROGRESS NOTES
"Cardiology Subsequent Encounter Clinic Note  Name: Bonifacio Hernandez  MRN: [id number]  : 1950    CC: Atrial fibrillation    Active Issues:  Bonifacio Hernandez is a 73 y.o. 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   -Subsequent loop recorder noted atrial fibrillation for which the patient was initiated on Eliquis 2.5 mg twice daily     Problem #2 hypertension     Problem #3 hyperlipidemia  - on atorvastatin 80 mg     Since his stroke he denies any chest discomfort or shortness of breath. Denies any orthopnea/PND/lower extremity edema.  2 weeks ago patient had a dizzy spell; was sitting in a chair and he tried to get up at which point he fell and \"hit and broke my nose\".  Was evaluated in the emergency room regarding the same.  Reports that he has about 1 dizzy spell a month.      Past Medical History  Past Medical History:   Diagnosis Date    Ischemic stroke (CMS/[facility]ly [facility])     Personal history of other diseases of the circulatory system 2021    History of cardiac arrhythmia       Past Surgical History  Past Surgical History:   Procedure Laterality Date    OTHER SURGICAL HISTORY  2019    Varicose vein ligation    OTHER SURGICAL HISTORY  2019    Inguinal hernia repair    OTHER SURGICAL HISTORY  2019    Esophagogastroduodenoscopy    OTHER SURGICAL HISTORY  2019    Knee replacement    OTHER SURGICAL HISTORY  10/15/2019    Back surgery    OTHER SURGICAL HISTORY  2020    Colonoscopy    OTHER SURGICAL HISTORY  2023    Loop monitor       Medications  Current Outpatient Medications on File Prior to Visit   Medication Sig Dispense Refill    apixaban (Eliquis) 2.5 mg tablet Take 1 tablet (2.5 mg) by mouth 2 times a day.      atorvastatin (Lipitor) 40 mg tablet Take 1 tablet (40 mg) by mouth once daily. " "90 tablet 3    calcium carbonate-vitamin D3 600 mg-20 mcg (800 unit) tablet Take 2 tablets by mouth once daily.      esomeprazole (NexIUM) 20 mg DR capsule Take 1 capsule (20 mg) by mouth once daily.      hydroCHLOROthiazide (HYDRODiuril) 12.5 mg tablet Take 1 tablet (12.5 mg) by mouth once daily. 90 tablet 3    ketoconazole (NIZOral) 2 % cream Apply 1 Application topically 2 times a day.      ketoconazole (NIZOral) 2 % shampoo Apply 1 Application topically every other day.      latanoprost (Xalatan) 0.005 % ophthalmic solution Administer 1 drop into both eyes once daily at bedtime.      losartan (Cozaar) 50 mg tablet Take 1 tablet (50 mg) by mouth once daily. 90 tablet 3    multivitamin tablet Take 1 tablet by mouth once daily.      sertraline (Zoloft) 50 mg tablet Take 1 tablet (50 mg) by mouth once daily. 30 tablet 11    tamsulosin (Flomax) 0.4 mg 24 hr capsule Take by mouth.      [DISCONTINUED] amoxicillin (Amoxil) 500 mg capsule Take 1 capsule (500 mg) by mouth 3 times a day for 5 days. 15 capsule 0     No current facility-administered medications on file prior to visit.       Allergies  Allergies   Allergen Reactions    Adhesive Tape-Silicones Other    Bee Pollen Other    Lisinopril Cough       Social History  Social History     Tobacco Use    Smoking status: Never    Smokeless tobacco: Never   Vaping Use    Vaping Use: Never used   Substance Use Topics    Alcohol use: Never    Drug use: Never       Family History  Family History   Problem Relation Name Age of Onset    Emphysema Father      Hypertension Sister      Pulmonary embolism Sister         Physical Examination  Vitals: /70   Pulse 78   Ht 1.778 m (5' 10\")   Wt 83 kg (183 lb)   SpO2 98%   BMI 26.26 kg/m²   General: awake, alert and oriented. No acute distress.   Skin: Skin is warm, dry and intact without rashes or lesions. Appropriate color for ethnicity. Nail beds pink with no cyanosis or clubbing  HEENT: normocephalic, atraumatic; " conjunctivae are clear without exudates or hemorrhage. Sclera is non-icteric. Eyelids are normal in appearance without swelling or lesions. Hearing intact. Nares are patent bilaterally. Moist mucous membranes.   Cardiovascular: Regular. No murmurs, gallops, or rubs are auscultated. S1 and S2 are heard and are of normal intensity. No JVD, no carotid bruits  Respiratory: Thorax symmetric. CTAB, breath sounds vesicular. No crackles, wheezes or ronchi.   Gastrointestinal: soft, non-distended, BS + x 4  Genitourinary: exam deferred  Musculoskeletal: moves all extremities  Extremities: pulses palpable bilaterally; no swelling or erythema; no edema  Neurological: alert & oriented x 3; no focal deficits  Psychiatric: appropriate mood and affect       Labs/Imaging/Procedures    Lab Results   Component Value Date    HGB 14.5 10/08/2023    HGB 15.9 04/10/2023    HGB 14.3 11/01/2022    HGB 15.5 10/31/2022     10/08/2023    WBC 10.2 10/08/2023     (L) 10/08/2023    K 3.9 10/08/2023    CREATININE 0.76 10/08/2023    CREATININE 0.93 04/10/2023    CREATININE 0.73 11/01/2022    CREATININE 0.76 10/31/2022    BUN 12 10/08/2023    CALCIUM 9.5 10/08/2023    INR 1.1 10/31/2022    TROPHS 6 10/08/2023    TROPHS 6 10/08/2023    TROPHS 7 10/31/2022    LDLF 80 11/01/2022     No echocardiogram results found for the past 12 months  CT maxillofacial bones wo IV contrast, CT 3D reconstruction  Narrative: Interpreted By:  Ugo Cabral,   STUDY:  CT FACIAL BONES WO IV CONTRAST  10/8/2023 5:13 pm      INDICATION:  Signs/Symptoms:fall      COMPARISON:  None.      ACCESSION NUMBER(S):  [id number]      ORDERING CLINICIAN:  ASPEN CORBIN      TECHNIQUE:  Thin cut axial CT images through the facial bones were obtained and  reconstructed in the coronal  and sagittal plane. 3D reconstruction  in the sagittal, axial and coronal plane created on a separate  workstation and 3D model.      FINDINGS:  Bilateral carotid bulb  calcifications.      Comminuted fracture of the right left nasal bones with soft tissue  swelling. Nasal septal thickening. Nasal septal hematoma can not be  excluded.      Orbits are normal.      Impression: Fractures of the right and left nasal bones.      MACRO:  None      Signed by: Ugo Cabral 10/8/2023 6:08 PM  Dictation workstation:   [id number]    Echocardiogram November 2022:  CONCLUSIONS:  1. Left ventricular systolic function is normal with a 55-60% estimated ejection fraction.  2. Spectral Doppler shows an impaired relaxation pattern of left ventricular diastolic filling.    Impression  Bonifacio Hernandez is a 73 y.o. male   with a medical history of hypertension, hyperlipidemia, abnormal event monitor here to establish care regarding the following conditions:     Problem #1 recent CVA  -Posterior MCA CVA without lasting neurological deficits October 2022.   -Subsequent loop recorder noted atrial fibrillation for which the patient was initiated on Eliquis 2.5 mg twice daily     Problem #2 hypertension     Problem #3 hyperlipidemia  - on atorvastatin 80 mg     Plan:  -Given his very significant fall and episodes of dizziness we did discuss left atrial appendage occluder devices.  He is agreeable to having a conversation regarding the same.  We will refer him to the structural team for consideration for a Watchman device  -Continue atorvastatin 80 mg at this time.    -Sinus rhythm in clinic today.  RTC 1 year                                  Shared Decision Tool - Referral for Left Atrial Appendage Occlusion    My patient Bonifacio Hernandez 1950 has been evaluated by me and is referred to [facility] for a left atrial appendage implant due thromboembolic stroke risk from atrial fibrillation with CHADS2 score >= 2 or a [id number] score >= 3.    This patient is not a good candidate for long term anticoagulation for the following reason(s): Dizzy spells resulting in falls causing orthopedic injuries.    This  patient however should be able to tolerate short term anticoagulation as necessary for LAAO device implant.  My Patient and I, the referring physician, have reviewed the following:  Yes  Atrial Fibrillation increases the risk of stroke.  Yes Anticoagulants or blood thinners help reduce the risk of stroke for most people.  Yes    Blood thinners may cause minor or serious bleeding issues.  Yes  Blood thinners include the medications aspirin, warfarin, and NOAC (dabigatran, edoxaban, rivaroxaban, apixaban...), each with unique risk and safety profiles.  Yes We reviewed his/her anticoagulation history and previous experiences.  Yes We discussed lack of other alternative treatments available to prevent stroke risk.  Yes We discussed the LAAO device implant vs taking anticoagulation to reduce stroke risk.  Yes We referred the patient to a LAAO outpatient clinic for further explanation and consideration.          Yes The LAAO device has been adequately explained along with the risks and benefits of treatment. Alternative treatment has been discussed with all questions answered. After discussion of the above considerations, it has been decided to be evaluated for the LAAO therapies.    Reviewed and approved by JORJE NELSON.  The above was discussed with the patient at his initial visit October 20, 2023 when he agreed for referral regarding the watchman.      Jorje Nelson MD  Advanced Heart Failure/Transplant Cardiology  Cardio-Oncology  [facility] [place]

## 2023-10-20 ENCOUNTER — OFFICE VISIT (OUTPATIENT)
Dept: CARDIOLOGY | Facility: CLINIC | Age: 73
End: 2023-10-20
Payer: MEDICARE

## 2023-10-20 VITALS
DIASTOLIC BLOOD PRESSURE: 70 MMHG | HEART RATE: 78 BPM | OXYGEN SATURATION: 98 % | SYSTOLIC BLOOD PRESSURE: 142 MMHG | HEIGHT: 70 IN | WEIGHT: 183 LBS | BODY MASS INDEX: 26.2 KG/M2

## 2023-10-20 DIAGNOSIS — Z91.81 AT HIGH RISK FOR INJURY RELATED TO FALL: Primary | ICD-10-CM

## 2023-10-20 DIAGNOSIS — I48.91 ATRIAL FIBRILLATION, UNSPECIFIED TYPE (MULTI): ICD-10-CM

## 2023-10-20 PROCEDURE — 3078F DIAST BP <80 MM HG: CPT | Performed by: STUDENT IN AN ORGANIZED HEALTH CARE EDUCATION/TRAINING PROGRAM

## 2023-10-20 PROCEDURE — 3077F SYST BP >= 140 MM HG: CPT | Performed by: STUDENT IN AN ORGANIZED HEALTH CARE EDUCATION/TRAINING PROGRAM

## 2023-10-20 PROCEDURE — 1126F AMNT PAIN NOTED NONE PRSNT: CPT | Performed by: STUDENT IN AN ORGANIZED HEALTH CARE EDUCATION/TRAINING PROGRAM

## 2023-10-20 PROCEDURE — 1159F MED LIST DOCD IN RCRD: CPT | Performed by: STUDENT IN AN ORGANIZED HEALTH CARE EDUCATION/TRAINING PROGRAM

## 2023-10-20 PROCEDURE — 99214 OFFICE O/P EST MOD 30 MIN: CPT | Performed by: STUDENT IN AN ORGANIZED HEALTH CARE EDUCATION/TRAINING PROGRAM

## 2023-10-20 PROCEDURE — 1160F RVW MEDS BY RX/DR IN RCRD: CPT | Performed by: STUDENT IN AN ORGANIZED HEALTH CARE EDUCATION/TRAINING PROGRAM

## 2023-10-20 PROCEDURE — 1036F TOBACCO NON-USER: CPT | Performed by: STUDENT IN AN ORGANIZED HEALTH CARE EDUCATION/TRAINING PROGRAM

## 2023-10-20 NOTE — TELEPHONE ENCOUNTER
JENNA CALLED ASKING IF DR. NELSON WAS GOING TO SEND IN A NEW RX TO HIS [facility] FOR HIS ELIQUIS, I DON'T THINK THAT WAS DONE.  CAN YOU PLEASE CHECK AND IF NOT PROPOSE IT TO HIM.    THANK YOU,  BECCA

## 2023-10-24 ENCOUNTER — HOSPITAL ENCOUNTER (OUTPATIENT)
Dept: CARDIOLOGY | Facility: CLINIC | Age: 73
Discharge: HOME | End: 2023-10-24
Payer: MEDICARE

## 2023-10-24 DIAGNOSIS — I63.9 IMPENDING CEREBROVASCULAR ACCIDENT (MULTI): ICD-10-CM

## 2023-10-24 PROCEDURE — 93298 REM INTERROG DEV EVAL SCRMS: CPT | Performed by: STUDENT IN AN ORGANIZED HEALTH CARE EDUCATION/TRAINING PROGRAM

## 2023-10-25 DIAGNOSIS — I63.9 IMPENDING CEREBROVASCULAR ACCIDENT (MULTI): ICD-10-CM

## 2023-11-13 ENCOUNTER — OFFICE VISIT (OUTPATIENT)
Dept: PRIMARY CARE | Facility: CLINIC | Age: 73
End: 2023-11-13
Payer: MEDICARE

## 2023-11-13 VITALS
BODY MASS INDEX: 26.1 KG/M2 | HEIGHT: 70 IN | HEART RATE: 72 BPM | OXYGEN SATURATION: 96 % | DIASTOLIC BLOOD PRESSURE: 68 MMHG | WEIGHT: 182.3 LBS | SYSTOLIC BLOOD PRESSURE: 138 MMHG

## 2023-11-13 DIAGNOSIS — M17.11 PRIMARY OSTEOARTHRITIS OF RIGHT KNEE: Primary | ICD-10-CM

## 2023-11-13 DIAGNOSIS — E78.2 MIXED HYPERLIPIDEMIA: ICD-10-CM

## 2023-11-13 DIAGNOSIS — I63.339 CEREBROVASCULAR ACCIDENT (CVA) DUE TO THROMBOSIS OF POSTERIOR CEREBRAL ARTERY, UNSPECIFIED BLOOD VESSEL LATERALITY (MULTI): ICD-10-CM

## 2023-11-13 DIAGNOSIS — I10 BENIGN ESSENTIAL HYPERTENSION: ICD-10-CM

## 2023-11-13 DIAGNOSIS — I48.0 PAROXYSMAL ATRIAL FIBRILLATION (MULTI): ICD-10-CM

## 2023-11-13 PROCEDURE — 1160F RVW MEDS BY RX/DR IN RCRD: CPT | Performed by: FAMILY MEDICINE

## 2023-11-13 PROCEDURE — 3078F DIAST BP <80 MM HG: CPT | Performed by: FAMILY MEDICINE

## 2023-11-13 PROCEDURE — 1126F AMNT PAIN NOTED NONE PRSNT: CPT | Performed by: FAMILY MEDICINE

## 2023-11-13 PROCEDURE — 99214 OFFICE O/P EST MOD 30 MIN: CPT | Performed by: FAMILY MEDICINE

## 2023-11-13 PROCEDURE — 1036F TOBACCO NON-USER: CPT | Performed by: FAMILY MEDICINE

## 2023-11-13 PROCEDURE — 3075F SYST BP GE 130 - 139MM HG: CPT | Performed by: FAMILY MEDICINE

## 2023-11-13 PROCEDURE — 1159F MED LIST DOCD IN RCRD: CPT | Performed by: FAMILY MEDICINE

## 2023-11-13 NOTE — PROGRESS NOTES
"Subjective   Patient ID: Bonifacio Hernandez is a 73 y.o. male who presents for Pre-op Exam (R TKR, Dr. Carr, 11/28/23).    HPI   72 yo for rtkr, never MI or CHF, never smoker, tolerates 4 MET activity.  Rode bike until this summer and was knee limited  Known CVA oct 22 with no residual effects, paroxysmal a fib with no recent episodes on loop monitir interrogation, hypertension, hyperlipidemia.  Review of Systems  General-no fatigue weight to within 10 pounds  Cardiac no chest pains palpitations change in exercise tolerance or capacity  Pulmonary no cough shortness of breath  GI no heartburn or abdominal pain  Musculoskeletal + joint pains in right knee and left wrist.    Objective   /68   Pulse 72   Ht 1.778 m (5' 10\")   Wt 82.7 kg (182 lb 4.8 oz)   SpO2 96%   BMI 26.16 kg/m²     Physical Exam  General:  Alert, No acute distress. Appears stated age  Eye:  Pupils are equal, round and reactive to light, Extraocular movements are intact, Normal conjunctiva.    Neck:  Supple, Non-tender, No carotid bruit, No jugular venous distention, No lymphadenopathy, No thyromegaly.    Respiratory:  Lungs are clear to auscultation, Respirations are non-labored, Breath sounds are equal.    Cardiovascular:  Normal rate, Regular rhythm, No murmur.    Gastrointestinal:  Soft, Non-tender, No organomegaly. No solid or pulsatile mass  Integumentary:  Warm, Dry. No concerning lesions on exposed areas  Neurologic:  Alert, Oriented.  Gross and fine motor intact, CN 2-12 intact  Psychiatric:  Cooperative, Appropriate mood & affect.    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  Does not look like any further testing needs to be performed prior to the planned " procedure.  I understand patient has also been cleared by Dr. Gary of cardiology

## 2023-11-29 ENCOUNTER — APPOINTMENT (OUTPATIENT)
Dept: CARDIOLOGY | Facility: CLINIC | Age: 73
End: 2023-11-29
Payer: MEDICARE

## 2023-12-04 ENCOUNTER — EVALUATION (OUTPATIENT)
Dept: PHYSICAL THERAPY | Facility: CLINIC | Age: 73
End: 2023-12-04
Payer: MEDICARE

## 2023-12-04 DIAGNOSIS — R26.2 DIFFICULTY WALKING: Primary | ICD-10-CM

## 2023-12-04 DIAGNOSIS — Z96.659 S/P TKR (TOTAL KNEE REPLACEMENT): ICD-10-CM

## 2023-12-04 DIAGNOSIS — M25.661 KNEE STIFFNESS, RIGHT: ICD-10-CM

## 2023-12-04 PROCEDURE — 97161 PT EVAL LOW COMPLEX 20 MIN: CPT | Mod: GP | Performed by: PHYSICAL THERAPIST

## 2023-12-04 PROCEDURE — 97110 THERAPEUTIC EXERCISES: CPT | Mod: GP | Performed by: PHYSICAL THERAPIST

## 2023-12-04 ASSESSMENT — PAIN - FUNCTIONAL ASSESSMENT: PAIN_FUNCTIONAL_ASSESSMENT: 0-10

## 2023-12-04 ASSESSMENT — PAIN SCALES - GENERAL: PAINLEVEL_OUTOF10: 0 - NO PAIN

## 2023-12-04 ASSESSMENT — ACTIVITIES OF DAILY LIVING (ADL): ADL_ASSISTANCE: INDEPENDENT

## 2023-12-04 NOTE — PROGRESS NOTES
Physical Therapy    Physical Therapy Evaluation & Treatment    Patient Name: Bonifacio Hernandez  MRN: [id number]  Today's Date: 12/4/2023    [phone]    Assessment/Plan   Patient presents one week post R TKA.  HE has deficits in R knee ROM, strength of LE, decreased balance and mobility.  Recommend skilled PT intervention 3x/week initially to iimprove deficits.  Patient was independent without assistive device prior to surgery.    PT Assessment  PT Assessment Results: Decreased strength, Decreased range of motion, Decreased endurance, Impaired balance, Decreased mobility, Decreased skin integrity, Pain  Rehab Prognosis: Excellent  Evaluation/Treatment Tolerance: Patient tolerated treatment well       Subjective     General Visit Information:  General  Reason for Referral: s/p R TKA  Referred By: RONEL Dhillon  Past Medical History Relevant to Rehab: L TKA 2014; afib  Family/Caregiver Present: No  General Comment: patient late to appointment; patient reports he had TKA on 11/28/23 at [facility].  Is using FWW today.  No HHC.  Has been doing toe raises, glute set at home.  Is able to independent.  Has walk in shower.  3 steps to enter with rail to house.  One floor set up.  Laundry in basement  Home Living:   One-floor set up  Prior Level of Function:  Prior Function Per Pt/Caregiver Report  Level of [place]: Independent with ADLs and functional transfers, Independent with homemaking with ambulation  ADL Assistance: Independent  Ambulatory Assistance: Independent  Precautions:   Has staples still.  Has appointment to remove next week  Vital Signs:   No concerns    Objective   Pain:  Pain Assessment  Pain Assessment: 0-10  Pain Score: 0 - No pain (recent pain medication)  Pain Location: Knee  Cognition:   WNL    General Assessments:  ROM  R knee AROM= lacking 10 deg extension, 87 deg flexion R knee   PROM=lacking 10 deg extension, 97 deg flexion     Patellar mobility R:  good  Incision: staples still in  "place    Strength:   RLE: fair quad set, has extensor lag;  hip flex=3-/5, abd and extension 3/5  R knee flexion 3-/5  LLE WFL    SLS level:  unable, needs UE support and then only brief  B heel raises x10 ea minimal amplitude on R  B toe raises x10 with min trunk compensation    Flexibility: decreased muscle length of R hamstring and gastroc limiting knee extension      Treatments:  There ex 27'  Supine ankle pumps with foot elevated above heart level for HEP instruction  Heel slides x10, then overpressure stretchx2 and HEP  SAQ x10 and instruction for HEP  Gastroc stretch with strap instructed for HEP  Hamstring stretch seated EOB 30\" x2 and instructed for HEP  Manual hamstring and gastroc stretch 30\" x2  Manual stretch into knee extension 30\" x2  SLS with raised plinth support and instructed for HEP  B heel raises x10 and instucted HEP  B toe raises x10 and instructed HEP  Standing hip abd x10 ea leg and instructed HEP  Standing hip ext x10 ea leg and instructed HEP  Patellar mobilization completed and instructed HEP  Scar mobilization A once staples removed and incision healed  Manual therapy/IASTM, STM, cupping as needed to improve soft tissue/flexibility  Gentle joint mobs to increase knee ext (grade 1-2)     Outcome Measures:  Other Measures  Lower Extremity Funtional Score (LEFS): 37/80    Active       PT Problem       PT Goal 1       Start:  12/04/23    Expected End:  03/04/24       Patient will have improved AROM of right knee 0-120 deg to improve mobility..  4 weeks  Patient will have good quad/VMO strength and full extension of knee to complete 10 reps SLR with no lag... 4 weeks  Patient will be able single leg balance on level surface 10 sec without UE support RLE 2/3 trials...  4 weeks  Patient will ambulate with good heel strike on initial contact and good push off at terminal stance using LRAD...  4 weeks  Patient will ambulate with cane with good pattern and no limp... 8 weeks  Patient will be able " to ambulate up/down 8 steps with reciprocal pattern and good control using one HR.. 8 weeks  Patient will have >/=4/5 strnegth grossly R LE to improve mobility...  8 weeks  Patient will ambulate independently without device, demonstrate WNL strength RLE and good gait pattern... 3 months             Education Documentation  No documentation found.  Education Comments  No comments found.    Access Code: [email or web address]  URL: [email or web address]  Date: 12/04/2023  Prepared by: Tawny Ramon    Exercises  - Ankle Pumps in Elevation  - 5 x daily - 7 x weekly - 1 sets - 10 reps  - Long Sitting Calf Stretch with Strap  - 2 x daily - 7 x weekly - 1 sets - 2 reps - 30sec hold  - Seated Table Hamstring Stretch  - 2 x daily - 7 x weekly - 1 sets - 2 reps - 30 sec hold  - Supine Knee Extension Strengthening  - 1 x daily - 7 x weekly - 3 sets - 10 reps  - Seated Knee Flexion Extension AROM   - 1 x daily - 7 x weekly - 3 sets - 10 reps  - Standing Single Leg Stance with Counter Support  - 1 x daily - 7 x weekly - 3 sets - 10 reps  - Heel Raises with Counter Support  - 1 x daily - 7 x weekly - 3 sets - 10 reps  - Heel Toe Raises with Counter Support  - 1 x daily - 7 x weekly - 3 sets - 10 reps  - Standing Hip Abduction with Counter Support  - 1 x daily - 7 x weekly - 3 sets - 10 reps  - Standing Hip Extension with Counter Support  - 1 x daily - 7 x weekly - 3 sets - 10 reps

## 2023-12-05 ENCOUNTER — TREATMENT (OUTPATIENT)
Dept: PHYSICAL THERAPY | Facility: CLINIC | Age: 73
End: 2023-12-05
Payer: MEDICARE

## 2023-12-05 DIAGNOSIS — Z96.659 S/P TKR (TOTAL KNEE REPLACEMENT): ICD-10-CM

## 2023-12-05 DIAGNOSIS — M25.661 KNEE STIFFNESS, RIGHT: ICD-10-CM

## 2023-12-05 DIAGNOSIS — Z96.651 STATUS POST TOTAL RIGHT KNEE REPLACEMENT: Primary | ICD-10-CM

## 2023-12-05 DIAGNOSIS — R26.2 DIFFICULTY WALKING: ICD-10-CM

## 2023-12-05 PROCEDURE — 97110 THERAPEUTIC EXERCISES: CPT | Mod: GP | Performed by: PHYSICAL THERAPIST

## 2023-12-05 ASSESSMENT — PAIN - FUNCTIONAL ASSESSMENT: PAIN_FUNCTIONAL_ASSESSMENT: 0-10

## 2023-12-05 ASSESSMENT — PAIN SCALES - GENERAL: PAINLEVEL_OUTOF10: 4

## 2023-12-05 NOTE — PROGRESS NOTES
"Physical Therapy    Physical Therapy Treatment    Patient Name: Bonifacio Hernandez  MRN: [id number]  Today's Date: 12/5/2023  Time Calculation  Start Time: 1515  Stop Time: 1600  Time Calculation (min): 45 min      Assessment:  Patient with improved ROM of R knee today with stretching and there ex.  Patient able to complete standing exercises with only 2 seated rest breaks.  Patient will need further review of HEP to assure correct technique.  He needed cues to not hold his breath during exercises. Overall great participation and prognosis.      PT Assessment  PT Assessment Results: Decreased strength, Decreased range of motion, Decreased endurance, Impaired balance, Decreased mobility, Decreased skin integrity, Pain  Rehab Prognosis: Excellent  Plan:  OP PT Plan  Treatment/Interventions: Cryotherapy, Education/ Instruction, Electrical stimulation, Gait training, Hot pack, Manual therapy, Neuromuscular re-education, Self care/ home management, Therapeutic activities, Therapeutic exercises  PT Plan: Skilled PT  PT Frequency: 3 times per week  Duration: 2 months  Onset Date: 11/28/23    Current Problem  1. Status post total right knee replacement        2. S/P TKR (total knee replacement)  Follow Up In Physical Therapy      3. Difficulty walking  Follow Up In Physical Therapy      4. Knee stiffness, right  Follow Up In Physical Therapy          General     General  Reason for Referral: s/p R TKA  Referred By: RONEL Dhillon  Past Medical History Relevant to Rehab: L TKA 2014; afib  Family/Caregiver Present: No  General Comment: patient reports he's not too sore from yesterday.  states he did do his (standing heel raises ) after he left here yesterday      Pain  Pain Assessment  Pain Assessment: 0-10  Pain Score: 4  Pain Location: Knee    Objective     Treatments:  Therapeutic Exercise  Therapeutic Exercise Performed: Yes  RLE:  SCIFIT x5 min Level1  Fwd step up 4\" x10   Lat step up 4\" x10  In //bars:  -SLS 10\" x4 UE " "support (cues to lighten touch)  -B heel raises x10   -B toe raises x10   -Standing hip abd x10 ea leg   -Standing hip ext x10 ea leg   Manual hamstring and gastroc stretch 1 min x2  Manual stretch into knee extension /overpressure at knee 30\" x4  Gentle joint mobs to increase knee ext (grade 1-2)2x10 N  Supine ankle pumps with foot elevated above heart HEP   Heel slides with strap x10, then overpressure stretchx2   SAQ with ball squeeze x10  Gastroc stretch with strap reviewed for HEP  Hamstring stretch seated EOB X- HEP  Patellar mobilization Scar mobilization A once staples removed and incision healed  Manual therapy/IASTM, STM, cupping as needed to improve soft tissue/flexibility A      OP EDUCATION:  Outpatient Education  Individual(s) Educated: Patient  Education Provided: Home Exercise Program (verbal review)  Diagnosis and Precautions: difficulty walking s/p R TKA 11/28/23, still has staples  Education Comment: patient will need reinforcement of HEP    Goals:  Active       PT Problem       PT Goal 1       Start:  12/04/23    Expected End:  03/04/24       Patient will have improved AROM of right knee 0-120 deg to improve mobility..  4 weeks  Patient will have good quad/VMO strength and full extension of knee to complete 10 reps SLR with no lag... 4 weeks  Patient will be able single leg balance on level surface 10 sec without UE support RLE 2/3 trials...  4 weeks  Patient will ambulate with good heel strike on initial contact and good push off at terminal stance using LRAD...  4 weeks  Patient will ambulate with cane with good pattern and no limp... 8 weeks  Patient will be able to ambulate up/down 8 steps with reciprocal pattern and good control using one HR.. 8 weeks  Patient will have >/=4/5 strnegth grossly R LE to improve mobility...  8 weeks  Patient will ambulate independently without device, demonstrate WNL strength RLE and good gait pattern... 3 months           "

## 2023-12-07 ENCOUNTER — TREATMENT (OUTPATIENT)
Dept: PHYSICAL THERAPY | Facility: CLINIC | Age: 73
End: 2023-12-07
Payer: MEDICARE

## 2023-12-07 DIAGNOSIS — M25.661 KNEE STIFFNESS, RIGHT: ICD-10-CM

## 2023-12-07 DIAGNOSIS — Z96.659 S/P TKR (TOTAL KNEE REPLACEMENT): ICD-10-CM

## 2023-12-07 DIAGNOSIS — R26.2 DIFFICULTY WALKING: ICD-10-CM

## 2023-12-07 PROCEDURE — 97110 THERAPEUTIC EXERCISES: CPT | Mod: GP,CQ

## 2023-12-07 PROCEDURE — 97140 MANUAL THERAPY 1/> REGIONS: CPT | Mod: GP,CQ

## 2023-12-07 ASSESSMENT — PAIN - FUNCTIONAL ASSESSMENT: PAIN_FUNCTIONAL_ASSESSMENT: 0-10

## 2023-12-07 ASSESSMENT — PAIN SCALES - GENERAL: PAINLEVEL_OUTOF10: 4

## 2023-12-07 NOTE — PROGRESS NOTES
"Physical Therapy Treatment    Patient Name: Bonifacio Hernandez  MRN: [id number]  Today's Date: 12/7/2023  Time Calculation  Start Time: 1115  Stop Time: 1203  Time Calculation (min): 48 min      Assessment: Patient identified by name and date of birth. Patient requires 2 UE support with SLS, only able to perform 10 seconds. Added mini squats, patient performed with good form. Fair quad engagement with quad set. Minimal quad lag with addition of SLR. HEP given. 108 Degrees of R knee flexion and is able to go flat on table with overpressure.         Plan:  Plan to continue with LE strengthening and PROM to allow for improved gait function with ambulation. JW       Current Problem  1. S/P TKR (total knee replacement)  Follow Up In Physical Therapy      2. Difficulty walking  Follow Up In Physical Therapy      3. Knee stiffness, right  Follow Up In Physical Therapy          Subjective   Patient states that he still have swelling in his calf and some soreness and stiffness. Pain levels in R knee 3-4/10. 5-6/10 pain levels after session.     General Comment: patient reports he's not too sore from yesterday.  states he did do his (standing heel raises ) after he left here yesterday  Precautions  Precautions  Precautions Comment: Has staples still.  Has appointment to remove next week  Pain  Pain Assessment: 0-10  Pain Score: 4  Pain Location: Knee  Pain Orientation: Right    Treatments:   NuStep 5' No Resistance   Slant Board Stretch 1' x 2   Standing Hip Abd 2x10 B/L   Standing Hip Ext 2x10 B/L   Heel Raise 2x10  Mini Squat x10 N  SLS 10\" x 5   Seated HS Stretch 2 x 30\"  SLR x10 N  Heel Slide 10 x 10\" holds    Manual 8'  PROM flex/ext  Patellar mobs           OP EDUCATION:  Access Code: [email or web address]  URL: [email or web address]  Date: 12/07/2023  Prepared by: Angie Saini    Exercises  - Supine Quad Set  - 1 x daily - 7 x weekly - 10 reps - 5\" hold  - Active Straight Leg Raise with Quad Set  - 1 x daily - 7 " x weekly - 2 sets - 10 reps  - Mini Squat with Counter Support  - 1 x daily - 7 x weekly - 2 sets - 10 reps  Goals:  Active       PT Problem       PT Goal 1       Start:  12/04/23    Expected End:  03/04/24       Patient will have improved AROM of right knee 0-120 deg to improve mobility..  4 weeks  Patient will have good quad/VMO strength and full extension of knee to complete 10 reps SLR with no lag... 4 weeks  Patient will be able single leg balance on level surface 10 sec without UE support RLE 2/3 trials...  4 weeks  Patient will ambulate with good heel strike on initial contact and good push off at terminal stance using LRAD...  4 weeks  Patient will ambulate with cane with good pattern and no limp... 8 weeks  Patient will be able to ambulate up/down 8 steps with reciprocal pattern and good control using one HR.. 8 weeks  Patient will have >/=4/5 strnegth grossly R LE to improve mobility...  8 weeks  Patient will ambulate independently without device, demonstrate WNL strength RLE and good gait pattern... 3 months

## 2023-12-12 ENCOUNTER — TREATMENT (OUTPATIENT)
Dept: PHYSICAL THERAPY | Facility: CLINIC | Age: 73
End: 2023-12-12
Payer: MEDICARE

## 2023-12-12 DIAGNOSIS — M25.661 KNEE STIFFNESS, RIGHT: ICD-10-CM

## 2023-12-12 DIAGNOSIS — Z96.659 S/P TKR (TOTAL KNEE REPLACEMENT): ICD-10-CM

## 2023-12-12 DIAGNOSIS — R26.2 DIFFICULTY WALKING: ICD-10-CM

## 2023-12-12 PROCEDURE — 97140 MANUAL THERAPY 1/> REGIONS: CPT | Mod: GP,CQ

## 2023-12-12 PROCEDURE — 97110 THERAPEUTIC EXERCISES: CPT | Mod: GP,CQ

## 2023-12-12 ASSESSMENT — PAIN - FUNCTIONAL ASSESSMENT: PAIN_FUNCTIONAL_ASSESSMENT: 0-10

## 2023-12-12 ASSESSMENT — PAIN SCALES - GENERAL: PAINLEVEL_OUTOF10: 4

## 2023-12-12 NOTE — PROGRESS NOTES
"Physical Therapy Treatment    Patient Name: Bonifacio Hernandez  MRN: [id number]  Today's Date: 12/12/2023  Time Calculation  Start Time: 1314  Stop Time: 1358  Time Calculation (min): 44 min      Assessment: Patient identified by name and date of birth. Instructed ion gastroc and hamstrings stretch with strap to cont in HEP. He voiced good understanding.  Verbal and tactile cues  required for proper form with mini squats and hip abduction in standing.        Plan:  Continue with manual therapy to reduce soft tissue restriction / pain and strengthening to allow  improved R knee mobility for ease with ADLs and community ambulation. -CG.         Treatment/Interventions: Cryotherapy, Education/ Instruction, Electrical stimulation, Gait training, Hot pack, Manual therapy, Neuromuscular re-education, Self care/ home management, Therapeutic activities, Therapeutic exercises  PT Plan: Skilled PT  PT Frequency: 3 times per week  Duration: 2 months  Onset Date: 11/28/23  Current Problem  1. S/P TKR (total knee replacement)  Follow Up In Physical Therapy      2. Difficulty walking  Follow Up In Physical Therapy      3. Knee stiffness, right  Follow Up In Physical Therapy            Subjective  Reports staples taken out yesterday. He feels edema and tightness more limiting vs pain. He states began wearing compression socks due to swelling into R foot. He has difficulty      Precautions  Precautions  Precautions Comment: none  Pain  Pain Assessment: 0-10  Pain Score: 4  Pain Type: Surgical pain  Pain Location: Knee  Pain Orientation: Right    Treatments:   NuStep 5' No Resistance   Strap Stretch for R gastroc /hams 10x 5\" holds each (N)  Slant Board Stretch 1' x 2   Fwd lunge Stretch on step 10x  R (N)  Standing Hip Abd 2x10 B/L   Standing Hip Ext 2x10 B/L   Heel Raise 2x10  Mini Squat x10  SLS 10\" x 5   Seated HS Stretch 2 x 30\"  SLR x10  Heel Slide 10 x 10\" holds    Manual   PROM flex/ext  Patellar mobs not needed            OP " "EDUCATION:  Access Code: [email or web address]  URL: [email or web address]  Date: 12/07/2023  Prepared by: Angie Saini    Exercises  - Supine Quad Set  - 1 x daily - 7 x weekly - 10 reps - 5\" hold  - Active Straight Leg Raise with Quad Set  - 1 x daily - 7 x weekly - 2 sets - 10 reps  - Mini Squat with Counter Support  - 1 x daily - 7 x weekly - 2 sets - 10 reps  Goals:  Active       PT Problem       PT Goal 1       Start:  12/04/23    Expected End:  03/04/24       Patient will have improved AROM of right knee 0-120 deg to improve mobility..  4 weeks  Patient will have good quad/VMO strength and full extension of knee to complete 10 reps SLR with no lag... 4 weeks  Patient will be able single leg balance on level surface 10 sec without UE support RLE 2/3 trials...  4 weeks  Patient will ambulate with good heel strike on initial contact and good push off at terminal stance using LRAD...  4 weeks  Patient will ambulate with cane with good pattern and no limp... 8 weeks  Patient will be able to ambulate up/down 8 steps with reciprocal pattern and good control using one HR.. 8 weeks  Patient will have >/=4/5 strnegth grossly R LE to improve mobility...  8 weeks  Patient will ambulate independently without device, demonstrate WNL strength RLE and good gait pattern... 3 months            "

## 2023-12-14 ENCOUNTER — TREATMENT (OUTPATIENT)
Dept: PHYSICAL THERAPY | Facility: CLINIC | Age: 73
End: 2023-12-14
Payer: MEDICARE

## 2023-12-14 DIAGNOSIS — Z96.659 S/P TKR (TOTAL KNEE REPLACEMENT): ICD-10-CM

## 2023-12-14 DIAGNOSIS — R26.2 DIFFICULTY WALKING: ICD-10-CM

## 2023-12-14 DIAGNOSIS — M25.661 KNEE STIFFNESS, RIGHT: ICD-10-CM

## 2023-12-14 DIAGNOSIS — Z96.651 STATUS POST TOTAL RIGHT KNEE REPLACEMENT: Primary | ICD-10-CM

## 2023-12-14 PROCEDURE — 97110 THERAPEUTIC EXERCISES: CPT | Mod: GP,CQ

## 2023-12-14 PROCEDURE — 97140 MANUAL THERAPY 1/> REGIONS: CPT | Mod: GP,CQ

## 2023-12-14 ASSESSMENT — PAIN - FUNCTIONAL ASSESSMENT: PAIN_FUNCTIONAL_ASSESSMENT: 0-10

## 2023-12-14 ASSESSMENT — PAIN SCALES - GENERAL: PAINLEVEL_OUTOF10: 3

## 2023-12-14 NOTE — PROGRESS NOTES
"Physical Therapy Treatment    Patient Name: Bonifacio Hernandez  MRN: [id number]  Today's Date: 12/14/2023  Time Calculation  Start Time: 1400  Stop Time: 1445  Time Calculation (min): 45 min      Assessment:   Patient identified by name and date of birth. Patient demonstrated good tolerance to addition of bike and step ups this date. Patient presented with 115 degrees flexion with AAROM. He demonstrated improved ROM this date.     Plan:  OP PT Plan  Treatment/Interventions: Cryotherapy, Education/ Instruction, Electrical stimulation, Gait training, Hot pack, Manual therapy, Neuromuscular re-education, Self care/ home management, Therapeutic activities, Therapeutic exercises  PT Plan: Skilled PT  PT Frequency: 3 times per week  Duration: 2 months  Onset Date: 11/28/23  Certification Period Start Date: 11/28/23  Certification Period End Date: 12/28/23  Number of Treatments Authorized: 12  Rehab Potential: Excellent    Continue with progression of ROM and strengthening for increased ease and normalization of gait. AW    Current Problem  Problem List Items Addressed This Visit             ICD-10-CM       Musculoskeletal and Injuries    Knee stiffness, right M25.661       Symptoms and Signs    Difficulty walking R26.2     Other Visit Diagnoses         Codes    Status post total right knee replacement [Z96.651]    -  Primary Z96.651    S/P TKR (total knee replacement)     Z96.659            Subjective Patient reported compliance with % of the time. He reported 3-4/10 pain after treatment.     Precautions  Precautions  Precautions Comment: none    Pain  Pain Assessment: 0-10  Pain Score: 3  Pain Type: Surgical pain  Pain Location: Knee  Pain Orientation: Right     Treatments:  Therapeutic Exercise  Therapeutic Exercise Performed: Yes  S/P R TKA 11/28/23   Bike full motion 5'  Slant Board Stretch 1' x 2   Step ups fwd 6\" 2 x 10 (N)  Fwd lunge Stretch on step 3 x 20\"   Standing Hip Abd 2x10 B/L   Standing Hip Ext 2x10 B/L " "  Heel Raise 2x10  Mini Squat 2x10 (P)  SLS 3 x 20\"   LAQ 2 x 10 (N)  Seated HS Stretch 2 x 30\"  SLR x15 (P)  Heel Slide 10 x 10\" holds  Strap Stretch for R gastroc /hams 10x 5\" holds each (X)  NuStep 5' No Resistance (X)    Manual Therapy  Manual Therapy Performed: Yes   PROM flex/ext  Patellar mobs not needed   OP EDUCATION:   Access Code: [email or web address]  URL: [email or web address]  Date: 12/07/2023  Prepared by: Angie Saini     Exercises  - Supine Quad Set  - 1 x daily - 7 x weekly - 10 reps - 5\" hold  - Active Straight Leg Raise with Quad Set  - 1 x daily - 7 x weekly - 2 sets - 10 reps  - Mini Squat with Counter Support  - 1 x daily - 7 x weekly - 2 sets - 10 reps    Goals:  Active       PT Problem       PT Goal 1       Start:  12/04/23    Expected End:  03/04/24       Patient will have improved AROM of right knee 0-120 deg to improve mobility..  4 weeks  Patient will have good quad/VMO strength and full extension of knee to complete 10 reps SLR with no lag... 4 weeks  Patient will be able single leg balance on level surface 10 sec without UE support RLE 2/3 trials...  4 weeks  Patient will ambulate with good heel strike on initial contact and good push off at terminal stance using LRAD...  4 weeks  Patient will ambulate with cane with good pattern and no limp... 8 weeks  Patient will be able to ambulate up/down 8 steps with reciprocal pattern and good control using one HR.. 8 weeks  Patient will have >/=4/5 strnegth grossly R LE to improve mobility...  8 weeks  Patient will ambulate independently without device, demonstrate WNL strength RLE and good gait pattern... 3 months            "

## 2023-12-15 ENCOUNTER — TREATMENT (OUTPATIENT)
Dept: PHYSICAL THERAPY | Facility: CLINIC | Age: 73
End: 2023-12-15
Payer: MEDICARE

## 2023-12-15 DIAGNOSIS — Z96.659 S/P TKR (TOTAL KNEE REPLACEMENT): ICD-10-CM

## 2023-12-15 DIAGNOSIS — R26.2 DIFFICULTY WALKING: ICD-10-CM

## 2023-12-15 DIAGNOSIS — M25.661 KNEE STIFFNESS, RIGHT: ICD-10-CM

## 2023-12-15 PROCEDURE — 97110 THERAPEUTIC EXERCISES: CPT | Mod: GP,CQ

## 2023-12-15 ASSESSMENT — PAIN SCALES - GENERAL: PAINLEVEL_OUTOF10: 4

## 2023-12-15 ASSESSMENT — PAIN - FUNCTIONAL ASSESSMENT: PAIN_FUNCTIONAL_ASSESSMENT: 0-10

## 2023-12-15 NOTE — PROGRESS NOTES
"Physical Therapy Treatment    Patient Name: Bonifacio Hernandez  MRN: [id number]  Today's Date: 12/15/2023  Time Calculation  Start Time: 1135  Stop Time: 1220  Time Calculation (min): 45 min      Assessment:   Patient appropriately challenged. Able to tolerate session with mild difficulty, and requires intermittent finger tip touching with SLS. Patient demo's no c/o increased symptoms at end of session.     Plan:  Continue to work on improving strength and ROM to be able to ambulate with no assistive device. -AB.     OP PT Plan  Treatment/Interventions: Cryotherapy, Education/ Instruction, Electrical stimulation, Gait training, Hot pack, Manual therapy, Neuromuscular re-education, Self care/ home management, Therapeutic activities, Therapeutic exercises  PT Plan: Skilled PT  PT Frequency: 3 times per week  Duration: 2 months  Onset Date: 11/28/23  Certification Period Start Date: 11/28/23  Certification Period End Date: 12/28/23  Number of Treatments Authorized: 12  Rehab Potential: Excellent  Plan of Care Agreement: Patient    Current Problem  Problem List Items Addressed This Visit             ICD-10-CM    Difficulty walking R26.2    Knee stiffness, right M25.661     Other Visit Diagnoses         Codes    S/P TKR (total knee replacement)     Z96.659            Subjective   General   Patient states that he hasn't really done much today so he's not really having that much pain.     Precautions  Precautions  Precautions Comment: none    Pain  Pain Assessment: 0-10  Pain Score: 4  Pain Type: Surgical pain  Pain Location: Knee  Pain Orientation: Right      Treatments:  Therapeutic Exercise: 40 minutes, 3 units  S/P R TKA 11/28/23   Bike full motion 6' (P, time)   Slant Board Stretch 1' x 2   Step ups fwd 6\" 2 x 10 (N)  Fwd lunge Stretch on step 3 x 20\"   Standing Hip Abd 2x10 B/L   Standing Hip Ext 2x10 B/L   Heel Raise 2x10  Mini Squat 2x10   SLS 3 x 30\" (P, hold time)   LAQ 2 x 10 (N)  Seated HS Stretch 2 x 30\"  SLR x15 (X, " "time)   Heel Slide 10 x 10\" holds (X, time)   Strap Stretch for R gastroc /hams 10x 5\" holds each (X)  NuStep 5' No Resistance (X)     Not today: 12-15-23:   PROM flex/ext  Patellar mobs not needed     OP EDUCATION:   Access Code: [email or web address]  URL: [email or web address]  Date: 12/07/2023  Prepared by: Angie Saini    Exercises  - Supine Quad Set  - 1 x daily - 7 x weekly - 10 reps - 5\" hold  - Active Straight Leg Raise with Quad Set  - 1 x daily - 7 x weekly - 2 sets - 10 reps  - Mini Squat with Counter Support  - 1 x daily - 7 x weekly - 2 sets - 10 reps    Goals:  Active       PT Problem       PT Goal 1       Start:  12/04/23    Expected End:  03/04/24       Patient will have improved AROM of right knee 0-120 deg to improve mobility..  4 weeks  Patient will have good quad/VMO strength and full extension of knee to complete 10 reps SLR with no lag... 4 weeks  Patient will be able single leg balance on level surface 10 sec without UE support RLE 2/3 trials...  4 weeks  Patient will ambulate with good heel strike on initial contact and good push off at terminal stance using LRAD...  4 weeks  Patient will ambulate with cane with good pattern and no limp... 8 weeks  Patient will be able to ambulate up/down 8 steps with reciprocal pattern and good control using one HR.. 8 weeks  Patient will have >/=4/5 strnegth grossly R LE to improve mobility...  8 weeks  Patient will ambulate independently without device, demonstrate WNL strength RLE and good gait pattern... 3 months            "

## 2023-12-18 ENCOUNTER — TREATMENT (OUTPATIENT)
Dept: PHYSICAL THERAPY | Facility: CLINIC | Age: 73
End: 2023-12-18
Payer: MEDICARE

## 2023-12-18 DIAGNOSIS — R26.2 DIFFICULTY WALKING: ICD-10-CM

## 2023-12-18 DIAGNOSIS — M25.661 KNEE STIFFNESS, RIGHT: ICD-10-CM

## 2023-12-18 DIAGNOSIS — Z96.659 S/P TKR (TOTAL KNEE REPLACEMENT): ICD-10-CM

## 2023-12-18 PROCEDURE — 97110 THERAPEUTIC EXERCISES: CPT | Mod: GP,CQ

## 2023-12-18 ASSESSMENT — PAIN SCALES - GENERAL: PAINLEVEL_OUTOF10: 4

## 2023-12-18 ASSESSMENT — PAIN - FUNCTIONAL ASSESSMENT: PAIN_FUNCTIONAL_ASSESSMENT: 0-10

## 2023-12-18 NOTE — PROGRESS NOTES
"Physical Therapy Treatment    Patient Name: Bonifacio Hernandez  MRN: [id number]  Today's Date: 12/18/2023  Time Calculation  Start Time: 1317  Stop Time: 1400  Time Calculation (min): 43 min      Assessment:   Patient able to achieve 120 degrees of R knee AAROM flexion this date. Patient requires verbal cues for proper form with exercises, but responds well to treatment with no c/o increased symptoms.     Plan:  Continue to work on improving strength and ROM to be able to ambulate with no assistive device. -AB.     OP PT Plan  Treatment/Interventions: Cryotherapy, Education/ Instruction, Electrical stimulation, Gait training, Hot pack, Manual therapy, Neuromuscular re-education, Self care/ home management, Therapeutic activities, Therapeutic exercises  PT Plan: Skilled PT  PT Frequency: 3 times per week  Duration: 2 months  Onset Date: 11/28/23  Certification Period Start Date: 11/28/23  Certification Period End Date: 12/28/23  Number of Treatments Authorized: 12  Rehab Potential: Excellent  Plan of Care Agreement: Patient    Current Problem  Problem List Items Addressed This Visit             ICD-10-CM    Difficulty walking R26.2    Knee stiffness, right M25.661     Other Visit Diagnoses         Codes    S/P TKR (total knee replacement)     Z96.659            Subjective   General   Patient states that he's not really having more pain than normal.   Precautions  Precautions  Precautions Comment: none    Pain  Pain Assessment: 0-10  Pain Score: 4  Pain Location: Knee  Pain Orientation: Right      Treatments:  Therapeutic Exercise: 40 minutes, 3 units  S/P R TKA 11/28/23   Bike full motion 6' (P, time)   Slant Board Stretch 1' x 2   Step ups fwd/lateral 6\" 2 x 10 (P, position)   Fwd lunge Stretch on step 3 x 30\" (P, hold time)  Standing Hip Abd 2x10 B/L   Standing Hip Ext 2x10 B/L   Heel Raise 2x10  Mini Squat 2x10   SLS 3 x 30\" (P, hold time)   LAQ 2 x 10 (N)  Seated HS Stretch 2 x 30\"  SLR x15   Heel Slide 15 x 10\" holds " "(P, reps)   Strap Stretch for R gastroc /hams 10x 5\" holds each (X)  NuStep 5' No Resistance (X)     Not today: 12-15-23:   PROM flex/ext  Patellar mobs not needed     OP EDUCATION:   Access Code: [email or web address]  URL: [email or web address]  Date: 12/07/2023  Prepared by: Angie Saini    Exercises  - Supine Quad Set  - 1 x daily - 7 x weekly - 10 reps - 5\" hold  - Active Straight Leg Raise with Quad Set  - 1 x daily - 7 x weekly - 2 sets - 10 reps  - Mini Squat with Counter Support  - 1 x daily - 7 x weekly - 2 sets - 10 reps    Goals:  Active       PT Problem       PT Goal 1       Start:  12/04/23    Expected End:  03/04/24       Patient will have improved AROM of right knee 0-120 deg to improve mobility..  4 weeks  Patient will have good quad/VMO strength and full extension of knee to complete 10 reps SLR with no lag... 4 weeks  Patient will be able single leg balance on level surface 10 sec without UE support RLE 2/3 trials...  4 weeks  Patient will ambulate with good heel strike on initial contact and good push off at terminal stance using LRAD...  4 weeks  Patient will ambulate with cane with good pattern and no limp... 8 weeks  Patient will be able to ambulate up/down 8 steps with reciprocal pattern and good control using one HR.. 8 weeks  Patient will have >/=4/5 strnegth grossly R LE to improve mobility...  8 weeks  Patient will ambulate independently without device, demonstrate WNL strength RLE and good gait pattern... 3 months            "

## 2023-12-21 ENCOUNTER — TREATMENT (OUTPATIENT)
Dept: PHYSICAL THERAPY | Facility: CLINIC | Age: 73
End: 2023-12-21
Payer: MEDICARE

## 2023-12-21 DIAGNOSIS — M25.661 KNEE STIFFNESS, RIGHT: ICD-10-CM

## 2023-12-21 DIAGNOSIS — Z96.651 STATUS POST TOTAL RIGHT KNEE REPLACEMENT: Primary | ICD-10-CM

## 2023-12-21 DIAGNOSIS — Z96.659 S/P TKR (TOTAL KNEE REPLACEMENT): ICD-10-CM

## 2023-12-21 DIAGNOSIS — R26.2 DIFFICULTY WALKING: ICD-10-CM

## 2023-12-21 PROCEDURE — 97110 THERAPEUTIC EXERCISES: CPT | Mod: GP,CQ

## 2023-12-21 ASSESSMENT — PAIN SCALES - GENERAL: PAINLEVEL_OUTOF10: 4

## 2023-12-21 ASSESSMENT — PAIN - FUNCTIONAL ASSESSMENT: PAIN_FUNCTIONAL_ASSESSMENT: 0-10

## 2023-12-21 NOTE — PROGRESS NOTES
"Physical Therapy Treatment    Patient Name: Bonifacio Hernandez  MRN: [id number]  Today's Date: 12/21/2023  Time Calculation  Start Time: 1400  Stop Time: 1442  Time Calculation (min): 42 min      Assessment:   Patient identified by name and date of birth. Patient demonstrated good tolerance to exercises with minimal fatigue noted. He presented with moderate sway and required finger assist throughout SLS. He presented with 121 degrees flexion AAROM.     Plan:  OP PT Plan  Treatment/Interventions: Cryotherapy, Education/ Instruction, Electrical stimulation, Gait training, Hot pack, Manual therapy, Neuromuscular re-education, Self care/ home management, Therapeutic activities, Therapeutic exercises  PT Plan: Skilled PT  PT Frequency: 3 times per week  Duration: 2 months  Onset Date: 11/28/23  Certification Period Start Date: 11/28/23  Certification Period End Date: 12/28/23  Number of Treatments Authorized: 12  Rehab Potential: Excellent  Plan of Care Agreement: Patient    Continue with progression of ROM and strengthening for increased ease and normalization of gait pattern. AW   Current Problem  Problem List Items Addressed This Visit             ICD-10-CM       Musculoskeletal and Injuries    Knee stiffness, right M25.661       Symptoms and Signs    Difficulty walking R26.2     Other Visit Diagnoses         Codes    Status post total right knee replacement [Z96.651]    -  Primary Z96.651    S/P TKR (total knee replacement)     Z96.659            Subjective Patient reported compliance with % of the time. Patient reported 4/10 pain after treatment.     Precautions  Precautions  Precautions Comment: none    Pain  Pain Assessment: 0-10  Pain Score: 4  Pain Location: Knee  Pain Orientation: Right     Treatments:  Therapeutic Exercise  Therapeutic Exercise Performed: Yes  S/P R TKA 11/28/23   Bike full motion 6' (P, time)   Slant Board Stretch 1' x 2   Step ups fwd/lateral 6\" 2 x 10   Fwd lunge Stretch on step 3 x 30\" " "  Standing Hip Abd 2x10 B/L on Airex (P)  Standing Hip Ext 2x10 B/L on Airex (P)  Heel Raise 2x10 on Airex (P)  Mini Squat 2x10   SLS 3 x 30\"  LAQ 2 x 10 2# (P)  HSC green band 2x10 (N)  SLR x15   Heel Slide 15 x 10\" holds (P, reps)   Seated HS Stretch 2 x 30\"   Strap Stretch for R gastroc /hams 10x 5\" holds each (X)  NuStep 5' No Resistance (X)    Manual Therapy  Manual Therapy Performed: No (not this date)   PROM flex/ext  Patellar mobs not needed      OP EDUCATION:   Access Code: [email or web address]  URL: [email or web address]  Date: 12/07/2023  Prepared by: Angie Saini     Exercises  - Supine Quad Set  - 1 x daily - 7 x weekly - 10 reps - 5\" hold  - Active Straight Leg Raise with Quad Set  - 1 x daily - 7 x weekly - 2 sets - 10 reps  - Mini Squat with Counter Support  - 1 x daily - 7 x weekly - 2 sets - 10 reps       Goals:  Active       PT Problem       PT Goal 1       Start:  12/04/23    Expected End:  03/04/24       Patient will have improved AROM of right knee 0-120 deg to improve mobility..  4 weeks  Patient will have good quad/VMO strength and full extension of knee to complete 10 reps SLR with no lag... 4 weeks  Patient will be able single leg balance on level surface 10 sec without UE support RLE 2/3 trials...  4 weeks  Patient will ambulate with good heel strike on initial contact and good push off at terminal stance using LRAD...  4 weeks  Patient will ambulate with cane with good pattern and no limp... 8 weeks  Patient will be able to ambulate up/down 8 steps with reciprocal pattern and good control using one HR.. 8 weeks  Patient will have >/=4/5 strnegth grossly R LE to improve mobility...  8 weeks  Patient will ambulate independently without device, demonstrate WNL strength RLE and good gait pattern... 3 months            "

## 2023-12-22 ENCOUNTER — TREATMENT (OUTPATIENT)
Dept: PHYSICAL THERAPY | Facility: CLINIC | Age: 73
End: 2023-12-22
Payer: MEDICARE

## 2023-12-22 DIAGNOSIS — R26.2 DIFFICULTY WALKING: ICD-10-CM

## 2023-12-22 DIAGNOSIS — M25.661 KNEE STIFFNESS, RIGHT: ICD-10-CM

## 2023-12-22 DIAGNOSIS — Z96.659 S/P TKR (TOTAL KNEE REPLACEMENT): ICD-10-CM

## 2023-12-22 PROCEDURE — 97110 THERAPEUTIC EXERCISES: CPT | Mod: GP,CQ

## 2023-12-22 ASSESSMENT — PAIN SCALES - GENERAL: PAINLEVEL_OUTOF10: 3

## 2023-12-22 ASSESSMENT — PAIN - FUNCTIONAL ASSESSMENT: PAIN_FUNCTIONAL_ASSESSMENT: 0-10

## 2023-12-22 NOTE — PROGRESS NOTES
"Physical Therapy Treatment    Patient Name: Bonifacio Hernandez  MRN: [id number]  Today's Date: 12/22/2023  Time Calculation  Start Time: 1445  Stop Time: 1528  Time Calculation (min): 43 min      Assessment:  Pt. Demo good form with exercises, verbal cues needed prn.  Verbal cues needed with hip abd and extensions.  C/o fatigue noted with leg with balance and strengthening overall.       Plan:  Continue with progression of ROM and strengthening for increased ease and normalization of gait pattern.      OP PT Plan  Treatment/Interventions: Cryotherapy, Education/ Instruction, Electrical stimulation, Gait training, Hot pack, Manual therapy, Neuromuscular re-education, Self care/ home management, Therapeutic activities, Therapeutic exercises  PT Plan: Skilled PT  PT Frequency: 3 times per week  Duration: 2 months  Onset Date: 11/28/23  Certification Period Start Date: 11/28/23  Certification Period End Date: 12/28/23  Number of Treatments Authorized: 12  Rehab Potential: Excellent  Plan of Care Agreement: Patient       Current Problem  Problem List Items Addressed This Visit             ICD-10-CM    Difficulty walking R26.2    Knee stiffness, right M25.661     Other Visit Diagnoses         Codes    S/P TKR (total knee replacement)     Z96.659            Subjective   Pt. Reports soreness with knee.  States he was here yesterday and had a good workout.     Precautions  Precautions  Precautions Comment: none    Pain  Pain Assessment: 0-10  Pain Score: 3  Pain Location: Knee  Pain Orientation: Right    Treatments:   TE: 40 mins/3 units  Therapeutic Exercise  Therapeutic Exercise Performed: Yes  S/P R TKA 11/28/23   Bike full motion 6'   Slant Board Stretch 1' x 2   Step ups fwd/lateral 6\" 2 x 10   Fwd lunge Stretch on step 3 x 30\"   Standing Hip Abd 2x10 B/L on Airex   Standing Hip Ext 2x10 B/L on Airex   Heel Raise 2x10 on Airex   Mini Squat 2x10 (X)  SLS 3 x 30\"  LAQ 2 x 10 2#   HSC green band 2x10   SLR 2 x 10 (P)  Heel Slide " "15 x 10\" holds   Seated HS Stretch 2 x 30\"   Strap Stretch for R gastroc /hams 10x 5\" holds each (X)  NuStep 5' No Resistance (X)     Manual Therapy  Manual Therapy Performed: No (not this date)   PROM flex/ext  Patellar mobs not needed     OP EDUCATION:       Goals:  Active       PT Problem       PT Goal 1       Start:  12/04/23    Expected End:  03/04/24       Patient will have improved AROM of right knee 0-120 deg to improve mobility..  4 weeks  Patient will have good quad/VMO strength and full extension of knee to complete 10 reps SLR with no lag... 4 weeks  Patient will be able single leg balance on level surface 10 sec without UE support RLE 2/3 trials...  4 weeks  Patient will ambulate with good heel strike on initial contact and good push off at terminal stance using LRAD...  4 weeks  Patient will ambulate with cane with good pattern and no limp... 8 weeks  Patient will be able to ambulate up/down 8 steps with reciprocal pattern and good control using one HR.. 8 weeks  Patient will have >/=4/5 strnegth grossly R LE to improve mobility...  8 weeks  Patient will ambulate independently without device, demonstrate WNL strength RLE and good gait pattern... 3 months            "

## 2023-12-27 ENCOUNTER — APPOINTMENT (OUTPATIENT)
Dept: PHYSICAL THERAPY | Facility: CLINIC | Age: 73
End: 2023-12-27
Payer: MEDICARE

## 2023-12-29 ENCOUNTER — APPOINTMENT (OUTPATIENT)
Dept: PHYSICAL THERAPY | Facility: CLINIC | Age: 73
End: 2023-12-29
Payer: MEDICARE

## 2024-01-02 ENCOUNTER — TREATMENT (OUTPATIENT)
Dept: PHYSICAL THERAPY | Facility: CLINIC | Age: 74
End: 2024-01-02
Payer: MEDICARE

## 2024-01-02 DIAGNOSIS — R26.2 DIFFICULTY WALKING: ICD-10-CM

## 2024-01-02 DIAGNOSIS — Z96.659 S/P TKR (TOTAL KNEE REPLACEMENT): ICD-10-CM

## 2024-01-02 DIAGNOSIS — M25.661 KNEE STIFFNESS, RIGHT: ICD-10-CM

## 2024-01-02 PROCEDURE — 97110 THERAPEUTIC EXERCISES: CPT | Mod: GP,CQ

## 2024-01-02 ASSESSMENT — PAIN - FUNCTIONAL ASSESSMENT: PAIN_FUNCTIONAL_ASSESSMENT: 0-10

## 2024-01-02 ASSESSMENT — PAIN SCALES - GENERAL: PAINLEVEL_OUTOF10: 2

## 2024-01-02 NOTE — PROGRESS NOTES
"Physical Therapy Treatment    Patient Name: Bonifacio Hernandez  MRN: [id number]  Today's Date: 1/2/2024  Time Calculation  Start Time: 1358  Stop Time: 1439  Time Calculation (min): 41 min      Assessment:  Patient identified by name and birth date. He was able to progress CKC exercises without c/o. Able to easily obtain 119* of R knee flexion in sitting and make transitional movements with ease. Did note fatigue with SLS activities.            Plan:  Continue with ROM  and  CKC strengthening to allow  improved R knee stability for ease with standing ADLs and community ambulation. -CG.       OP PT Plan  Treatment/Interventions: Cryotherapy, Education/ Instruction, Electrical stimulation, Gait training, Hot pack, Manual therapy, Neuromuscular re-education, Self care/ home management, Therapeutic activities, Therapeutic exercises  PT Plan: Skilled PT  PT Frequency: 3 times per week  Duration: 2 months  Onset Date: 11/28/23  Certification Period Start Date: 11/28/23  Certification Period End Date: 12/28/23  Number of Treatments Authorized: 12  Rehab Potential: Excellent  Plan of Care Agreement: Patient       Current Problem  Problem List Items Addressed This Visit             ICD-10-CM       Musculoskeletal and Injuries    Knee stiffness, right M25.661       Symptoms and Signs    Difficulty walking R26.2     Other Visit Diagnoses         Codes    S/P TKR (total knee replacement)     Z96.659              Subjective   He reports now ambulating without device. Feels his ROM is progressing and overall pain is minimal. He states he is riding his recumbent bike at home approx 20 mins a day.          Precautions  Precautions  Precautions Comment: none    Pain  Pain Assessment: 0-10  Pain Score: 2  Pain Location: Knee  Pain Orientation: Right    Treatments:     Therapeutic Exercise  Therapeutic Exercise Performed: Yes  S/P R TKA 11/28/23     Bike full motion 6'   Slant Board Stretch 1' x 2   Step ups fwd/lateral 6\" 2 x 10   Fwd lunge " "Stretch on step 3 x 30\"   Marches on Airex 15x (N)  Standing Hip Abd 2x10 B/L on Airex   Standing Hip Ext 2x10 B/L on Airex   Heel Raise 2x10 on Airex   Fwd Lunges on Airex 10x each (N)  Step Overs onto Airex 10x each (N)  Side Stepping 2 laps (N)  Mini Squat 2x10   SLS 3 x 30\"  LAQ 2 x 10 2#   HSC green band 2x10     NOT Today   SLR 2 x 10 (P)  Heel Slide 15 x 10\" holds   Seated HS Stretch 2 x 30\"   Strap Stretch for R gastroc /hams 10x 5\" holds each (X)         Manual Therapy  Manual Therapy Performed: No (not this date)   PROM flex/ext  Patellar mobs not needed     OP EDUCATION:       Goals:  Active       PT Problem       PT Goal 1       Start:  12/04/23    Expected End:  03/04/24       Patient will have improved AROM of right knee 0-120 deg to improve mobility..  4 weeks  Patient will have good quad/VMO strength and full extension of knee to complete 10 reps SLR with no lag... 4 weeks  Patient will be able single leg balance on level surface 10 sec without UE support RLE 2/3 trials...  4 weeks  Patient will ambulate with good heel strike on initial contact and good push off at terminal stance using LRAD...  4 weeks  Patient will ambulate with cane with good pattern and no limp... 8 weeks  Patient will be able to ambulate up/down 8 steps with reciprocal pattern and good control using one HR.. 8 weeks  Patient will have >/=4/5 strnegth grossly R LE to improve mobility...  8 weeks  Patient will ambulate independently without device, demonstrate WNL strength RLE and good gait pattern... 3 months            "

## 2024-01-08 ENCOUNTER — HOSPITAL ENCOUNTER (OUTPATIENT)
Dept: CARDIOLOGY | Facility: CLINIC | Age: 74
Discharge: HOME | End: 2024-01-08
Payer: MEDICARE

## 2024-01-08 ENCOUNTER — TREATMENT (OUTPATIENT)
Dept: PHYSICAL THERAPY | Facility: CLINIC | Age: 74
End: 2024-01-08
Payer: MEDICARE

## 2024-01-08 DIAGNOSIS — Z95.818 IMPLANTABLE LOOP RECORDER PRESENT: ICD-10-CM

## 2024-01-08 DIAGNOSIS — I48.91 ATRIAL FIBRILLATION, UNSPECIFIED TYPE (MULTI): ICD-10-CM

## 2024-01-08 DIAGNOSIS — Z96.659 S/P TKR (TOTAL KNEE REPLACEMENT): ICD-10-CM

## 2024-01-08 DIAGNOSIS — M25.661 KNEE STIFFNESS, RIGHT: ICD-10-CM

## 2024-01-08 DIAGNOSIS — R26.2 DIFFICULTY WALKING: ICD-10-CM

## 2024-01-08 PROCEDURE — 93298 REM INTERROG DEV EVAL SCRMS: CPT | Performed by: STUDENT IN AN ORGANIZED HEALTH CARE EDUCATION/TRAINING PROGRAM

## 2024-01-08 PROCEDURE — 97110 THERAPEUTIC EXERCISES: CPT | Mod: GP | Performed by: PHYSICAL THERAPIST

## 2024-01-08 PROCEDURE — 93297 REM INTERROG DEV EVAL ICPMS: CPT

## 2024-01-08 ASSESSMENT — PAIN - FUNCTIONAL ASSESSMENT: PAIN_FUNCTIONAL_ASSESSMENT: 0-10

## 2024-01-08 ASSESSMENT — PAIN SCALES - GENERAL: PAINLEVEL_OUTOF10: 3

## 2024-01-08 NOTE — PROGRESS NOTES
"Physical Therapy    Physical Therapy Treatment    Patient Name: Bonifacio Hernandez  MRN: [id number]  Today's Date: 1/8/2024  Time Calculation  Start Time: 1245  Stop Time: 1345  Time Calculation (min): 60 min      Assessment:  Patient making excellent progress towards all goals.  He continues to have mild gait abnormality (slight limp) and lacks push off terminal stance and heel strike initial contact.  ROM is WFL (0-125deg).  Patient HEP progressed today.  Exercises progressed with several areas.    PT Assessment  PT Assessment Results: Decreased strength, Decreased range of motion, Decreased endurance, Impaired balance, Decreased mobility, Decreased skin integrity, Pain  Rehab Prognosis: Excellent  Plan:  OP PT Plan  Treatment/Interventions: Cryotherapy, Education/ Instruction, Electrical stimulation, Gait training, Hot pack, Manual therapy, Neuromuscular re-education, Self care/ home management, Therapeutic activities, Therapeutic exercises  PT Plan: Skilled PT  Rehab Potential: Excellent    Current Problem  1. S/P TKR (total knee replacement)  Follow Up In Physical Therapy      2. Difficulty walking  Follow Up In Physical Therapy      3. Knee stiffness, right  Follow Up In Physical Therapy          General  PT  Visit  PT Received On: 01/08/24  General  Reason for Referral: s/p R TKA  Referred By: RONEL Dhillon  Past Medical History Relevant to Rehab: L TKA 2014; afib    Subjective    Precautions  Precautions  Precautions Comment: none    Pain  Pain Assessment  Pain Assessment: 0-10  Pain Score: 3  Pain Location: Knee (rightk)    Objective     Extremity/Trunk Assessment  R knee AROM 0-125 deg    Treatments:  Therapeutic Exercise  Therapeutic Exercise Performed: Yes  S/P R TKA 11/28/23     Bike full motion 6'   Prone hip extension x10 RLE (N) and HEP I/S  S/L hip abd x10 RLE (N) and HEP I/S  SLS 3 x 30\", level  SLS AIREX 30\" x3 (N) and HEP I/S  Wall slides with 3\" ball squeeze 2x10 (N) and HEP I/S  HSC blue band " "2x15 (P band color and reps)  LAQ 2 x 15, 2.5# (P weight and reps)  SLS with ball toss/catch (level surface) (N)  Tandem stance (right foot in back) with ball toss/catch (N)  Leg lower off step (heel touch) 4\" step (trial of 6\" step but unable) x10 (N)  Slant Board Stretch 1' x 2   HEP update with Handout  Gait training for form (heel strike IC, toe off TS)    Not today 1/8/23:  Step ups fwd/lateral 6\" 2 x 10   Fwd lunge Stretch on step 3 x 30\"   Marches on Airex 15x (N)  Standing Hip Abd 2x10 B/L on Airex   Standing Hip Ext 2x10 B/L on Airex   Heel Raise 2x10 on Airex   Fwd Lunges on Airex 10x each (N)  Step Overs onto Airex 10x each (N)  Side Stepping 2 laps (N)  Mini Squat 2x10   SLR 2 x 10 (P)  Heel Slide 15 x 10\" holds   Seated HS Stretch 2 x 30\"   Strap Stretch for R gastroc /hams 10x 5\" holds each (X)     Manual Therapy  Manual Therapy Performed: No (not this date)   PROM flex/ext  Patellar mobs not needed     OP EDUCATION:  Outpatient Education  Individual(s) Educated: Patient  Education Provided: Home Exercise Program  Diagnosis and Precautions: difficulty walking s/p R TKA 11/28/23, still has staples  Access Code: [email or web address]  URL: [email or web address]  Date: 01/08/2024  Prepared by: Tawny Ramon    Exercises  - Prone Hip Extension  - 1 x daily - 7 x weekly - 3 sets - 10 reps  - Sidelying Hip Abduction  - 1 x daily - 7 x weekly - 3 sets - 10 reps  - Single Leg Stance on Pillow  - 1 x daily - 7 x weekly - 3 sets - 10 reps  - Wall Quarter Squat  - 1 x daily - 7 x weekly - 3 sets - 10 reps  Goals:  Active       PT Problem       PT Goal 1       Start:  12/04/23    Expected End:  03/04/24       Patient will have improved AROM of right knee 0-120 deg to improve mobility..  4 weeks  MET  Patient will have good quad/VMO strength and full extension of knee to complete 10 reps SLR with no lag... 4 weeks  MET  Patient will be able single leg balance on level surface 10 sec without UE support " RLE 2/3 trials...  4 weeks  GOOD PROGRESS  Patient will ambulate with good heel strike on initial contact and good push off at terminal stance using LRAD...  4 weeks PROGRESSINg  Patient will ambulate with cane with good pattern and no limp... 8 weeks  ambulating without device but impaired pattern 1/8/24  Patient will be able to ambulate up/down 8 steps with reciprocal pattern and good control using one HR.. 8 weeks  Patient will have >/=4/5 strnegth grossly R LE to improve mobility...  8 weeks  Patient will ambulate independently without device, demonstrate WNL strength RLE and good gait pattern... 3 months PROGRESSING

## 2024-01-08 NOTE — Clinical Note
January 30, 2024      No Recipients    Patient: Bonifacio Hernandez   YOB: 1950   Date of Visit: 1/8/2024       Dear Viet Resendiz PA-C  [address] [address]    The attached plan of care is being sent to you because your patient’s medical reimbursement requires that you certify the plan of care. Your signature is required to allow uninterrupted insurance coverage.      You may indicate your approval by signing below and faxing this form back to us at Dept Fax: [phone].    Please call Dept: [phone] with any questions or concerns.    Thank you for this referral,        Tawny Ramon PT  [address] [address]    Payer: Payor: MEDICARE / Plan: MEDICARE PART A AND B / Product Type: *No Product type* /                                                                         Date:     Dear Tawny Ramon PT,     Re: Mr. Bonifacio Hernandez, MRN:[id number]    I certify that I have reviewed the attached plan of care and it is medically necessary for Mr. Bonifacio Hernandez (1950) who is under my care.          ______________________________________                    _________________  Provider name and credentials                                           Date and time                                                                                      The following plan is in draft form.  Please refer to the current version for the most up-to-date information.            Plan of Care 1/30/24    Draft  Plan ID: [id number]           Participants as of 1/30/2024    Name Type Comments Contact Info    Viet Resendiz PA-C Referring Provider  [phone]    Tawny Ramon PT Physical Therapist  [phone]      Last Plan Note     Author: Tawny Ramon PT Status: Signed Last edited: 1/15/2024 12:45 PM       Physical Therapy    Physical Therapy Treatment    Patient Name: Bonifacio Hernandez  MRN: [id number]  Today's  Date: 1/15/2024  Time Calculation  Start Time: 1250  Stop Time: 1346  Time Calculation (min): 56 min      Assessment:  Patient has attended 12 visits of outpatient PT from 12/4/23 to 1/15/24.  He has made excellent progress with intervention.  Patient has good AROM of right knee of 0-125deg.  He has much improved strength of RLE/knee (4+/5).  He is able to perform single leg stance for 6 seconds or more on his right leg on a level surface.  He is ambulating without an assistive device and demonstrates heel strike on initial contact and push off at terminal stance.  He is able to ascend and descend steps with reciprocal pattern using a rail with mild hesitancy (decreased eccentric control) especially with descending stairs.  Patient would benefit from additional PT for strengthening if he chooses however he reports he is able to do all of his ADLS without concern and feels he is 75-80% back to normal.  Patient reports he has to have a procedure on his heart next week and his spouse will have a hip replacement at the beginning of Feb so patient declines additional therapy at this time and plans to continue his HEP and see how he does the next couuple of weeks since they have these appointments coming up.  IF patient doesn't return within 30 days then he will be discharged and this will be his discharge summary.  He currently is compliant with his Hep and it has also been updated today.     PT Assessment  PT Assessment Results: Decreased strength, Impaired balance, Decreased mobility, Pain  Rehab Prognosis: Excellent  Plan:  OP PT Plan  Treatment/Interventions: Cryotherapy, Education/ Instruction, Electrical stimulation, Gait training, Hot pack, Manual therapy, Neuromuscular re-education, Self care/ home management, Therapeutic activities, Therapeutic exercises  PT Plan: Skilled PT  Rehab Potential: Excellent  Plan of Care Agreement: Patient  No further appointments scheduled.  HOLD for 30 days to continue HEP and see  "how he does. IF no further therapy in 30 days then DC to HEP only.    Current Problem  1. S/P TKR (total knee replacement)  Follow Up In Physical Therapy      2. Difficulty walking  Follow Up In Physical Therapy      3. Knee stiffness, right  Follow Up In Physical Therapy          General  PT  Visit  PT Received On: 01/15/24  General  Reason for Referral: s/p R TKA  Referred By: RONEL Dhillon  Past Medical History Relevant to Rehab: L TKA 2014; afib  General Comment: visit 12/12;  states he has been doing his (new )HEP exercises at home    Subjective    Precautions   none    Pain  Pain Assessment  Pain Assessment: 0-10  Pain Score: 2 (right knee)    Objective   Cognition  Cognition  Overall Cognitive Status: Within Functional Limits    AROM R Knee =0-125deg    SLS 6 second on level surface, 8 sec on AIREX without UE support  Able to complete 10 unilateral heel raises on R leg with small amplitude  Heel touch off step is still challenging (eccentric control) but able to complete with touch down on heel    MMT:  R hip: prone hip ext=4+/5, supine hip flex 4+/5, S/L hip abd 4+/5  R Knee flexion=5-/5, knee extension=4+to 5-/5    Gait: striking heel on initial contact.  End of session with fatigue noted mild limp with muscle fatigue and soreness from exercise (patient will ice at home)    Outcome Measures:  Other Measures  Lower Extremity Funtional Score (LEFS): 62/80  Treatments:  Therapeutic Exercise  Therapeutic Exercise Performed: [place]/P R TKA 11/28/23      Bike full motion level 2, 5'   Slant Board Stretch 1' x 2 X  Step ups fwd/lateral 6\" 2 x 10 X  Prone hip extension and reviewd for HEP  S/L hip abd reviewed for HEP  SLS 3 x 30\", level   SLS AIREX 30\" x2  Unilateral heel raise 2x10 (N)  Wall slides with 5\" ball squeeze x10 (P)   HSC blue band 2x15 (P band color and reps)X  LAQ 2 x 15, 2.5# X  SLS with ball toss/catch (level surface) X  Tandem stance (right foot in back) with ball toss/catch X  Leg lower " "off step (heel touch) 4\" step  2x10   Running man 3x30\" R X  BM leg press:  BL LE 75# x15, U/L RLE 30# x15 (N)  HEP update with Handout  Gait training for form (heel strike IC, toe off TS)      Not today 1/8/23:  Fwd lunge Stretch on step 3 x 30\"   Marches on Airex 15x (N)  Standing Hip Abd 2x10 B/L on Airex   Standing Hip Ext 2x10 B/L on Airex   Heel Raise 2x10 on Airex   Fwd Lunges on Airex 10x each (N)  Step Overs onto Airex 10x each (N)  Side Stepping 2 laps (N)  Mini Squat 2x10   SLR 2 x 10 (P)  Heel Slide 15 x 10\" holds   Seated HS Stretch 2 x 30\"   Strap Stretch for R gastroc /hams 10x 5\" holds each (X)     Manual Therapy  Manual Therapy Performed: No (not this date)   PROM flex/ext  Patellar mobs not needed       OP EDUCATION:  Outpatient Education  Individual(s) Educated: Patient  Education Provided: Home Exercise Program  Diagnosis and Precautions: difficulty walking s/p R TKA 11/28/23, still has staples  Education Comment: added unilateral heel raises and leg lower off step to HEP  Access Code: [email or web address]  URL: [email or web address]  Date: 01/15/2024  Prepared by: Tawny Ramon    Exercises  - Prone Hip Extension  - 1 x daily - 7 x weekly - 3 sets - 10 reps  - Sidelying Hip Abduction  - 1 x daily - 7 x weekly - 3 sets - 10 reps  - Single Leg Stance on Pillow  - 2 x daily - 7 x weekly - 3 sets - 10 reps  - Wall Quarter Squat  - 2 x daily - 7 x weekly - 3 sets - 10 reps  - Single Leg Heel Raise with Counter Support  - 2 x daily - 7 x weekly - 2 sets - 10 reps  - Small-Range Single-Leg Squat on Step  - 2 x daily - 7 x weekly - 2 sets - 10 reps  Goals:  Active       PT Problem       PT Goal 1       Start:  12/04/23    Expected End:  03/04/24       Patient will have improved AROM of right knee 0-120 deg to improve mobility..  4 weeks  MET  Patient will have good quad/VMO strength and full extension of knee to complete 10 reps SLR with no lag... 4 weeks  MET  Patient will be able single " leg balance on level surface 10 sec without UE support RLE 2/3 trials...  4 weeks  GOOD PROGRESS  Patient will ambulate with good heel strike on initial contact and good push off at terminal stance using LRAD...  4 weeks MET  Patient will ambulate with cane with good pattern and no limp... MET  Patient will be able to ambulate up/down 8 steps with reciprocal pattern and good control using one HR.. 8 weeks, GOOD PROGRESS, able to use reciprocal pattern, mild hesitancy with decending control  Patient will have >/=4/5 strnegth grossly R LE to improve mobility...  8 weeks MET  Patient will ambulate independently without device, demonstrate WNL strength RLE and good gait pattern... 3 months MET

## 2024-01-09 ENCOUNTER — LAB (OUTPATIENT)
Dept: LAB | Facility: LAB | Age: 74
End: 2024-01-09
Payer: MEDICARE

## 2024-01-09 DIAGNOSIS — E78.2 MIXED HYPERLIPIDEMIA: ICD-10-CM

## 2024-01-09 DIAGNOSIS — I48.0 PAROXYSMAL ATRIAL FIBRILLATION (MULTI): ICD-10-CM

## 2024-01-09 DIAGNOSIS — I10 BENIGN ESSENTIAL HYPERTENSION: ICD-10-CM

## 2024-01-09 LAB
ALBUMIN SERPL BCP-MCNC: 4.1 G/DL (ref 3.4–5)
ALP SERPL-CCNC: 62 U/L (ref 33–136)
ALT SERPL W P-5'-P-CCNC: 21 U/L (ref 10–52)
ANION GAP SERPL CALC-SCNC: 10 MMOL/L (ref 10–20)
AST SERPL W P-5'-P-CCNC: 18 U/L (ref 9–39)
BILIRUB SERPL-MCNC: 0.6 MG/DL (ref 0–1.2)
BUN SERPL-MCNC: 12 MG/DL (ref 6–23)
CALCIUM SERPL-MCNC: 9.6 MG/DL (ref 8.6–10.3)
CHLORIDE SERPL-SCNC: 100 MMOL/L (ref 98–107)
CHOLEST SERPL-MCNC: 129 MG/DL (ref 0–199)
CHOLESTEROL/HDL RATIO: 2.6
CO2 SERPL-SCNC: 31 MMOL/L (ref 21–32)
CREAT SERPL-MCNC: 0.74 MG/DL (ref 0.5–1.3)
EGFRCR SERPLBLD CKD-EPI 2021: >90 ML/MIN/1.73M*2
ERYTHROCYTE [DISTWIDTH] IN BLOOD BY AUTOMATED COUNT: 12.8 % (ref 11.5–14.5)
GLUCOSE SERPL-MCNC: 85 MG/DL (ref 74–99)
HCT VFR BLD AUTO: 42.9 % (ref 41–52)
HDLC SERPL-MCNC: 49 MG/DL
HGB BLD-MCNC: 14 G/DL (ref 13.5–17.5)
LDLC SERPL CALC-MCNC: 69 MG/DL
MCH RBC QN AUTO: 30.9 PG (ref 26–34)
MCHC RBC AUTO-ENTMCNC: 32.6 G/DL (ref 32–36)
MCV RBC AUTO: 95 FL (ref 80–100)
NON HDL CHOLESTEROL: 80 MG/DL (ref 0–149)
NRBC BLD-RTO: 0 /100 WBCS (ref 0–0)
PLATELET # BLD AUTO: 241 X10*3/UL (ref 150–450)
POTASSIUM SERPL-SCNC: 3.9 MMOL/L (ref 3.5–5.3)
PROT SERPL-MCNC: 6.5 G/DL (ref 6.4–8.2)
RBC # BLD AUTO: 4.53 X10*6/UL (ref 4.5–5.9)
SODIUM SERPL-SCNC: 137 MMOL/L (ref 136–145)
TRIGL SERPL-MCNC: 57 MG/DL (ref 0–149)
VLDL: 11 MG/DL (ref 0–40)
WBC # BLD AUTO: 6.7 X10*3/UL (ref 4.4–11.3)

## 2024-01-09 PROCEDURE — 36415 COLL VENOUS BLD VENIPUNCTURE: CPT

## 2024-01-09 PROCEDURE — 85027 COMPLETE CBC AUTOMATED: CPT

## 2024-01-09 PROCEDURE — 80053 COMPREHEN METABOLIC PANEL: CPT

## 2024-01-09 PROCEDURE — 80061 LIPID PANEL: CPT

## 2024-01-09 NOTE — PROGRESS NOTES
Cardio: Dr. Abdirahman FRIEDMAN was asked by Dr. Gary to evaluate this patient in consultation for evaluation of left atrial appendage closure.    Bonifacio Hernandez is a 73-year-old male with hypertension hyperlipidemia prior stroke posterior infarct in November 2022 and atrial fibrillation.  Patient has been on anticoagulation with Eliquis.  He has normal LV systolic function with left ventricular ejection fraction of 55 to 60% based on transthoracic echocardiogram November 2022.  He has no history of significant valvular heart disease.    In October 2023 the patient had significant fall event with facial trauma and laceration of the left thumb.  He still has episodes of dizziness but has not had any recurrent falls.    Given the patient's significant fall,  the patient is referred for consideration of left atrial appendage closure for stroke risk reduction.       ROS:  Constitutional: not feeling tired, not feeling poorly, no fever and no chills.   Eyes: no eyesight problems, no blurred vision, no diplopia, no eye pain, no purulent discharge from the eyes, eyes not red, no dryness of the eyes and no itching of the eyes.   ENT: no nosebleeds, no hearing loss, no tinnitus, no earache, no sore throat, no hoarseness, no swollen glands in the neck and no nasal discharge.   Cardiovascular: no intermittent leg claudication, no chest pain, no tightness or heavy pressure, no shortness of breath, no palpitations, no lower extremity edema, the heart rate was not slow, the heart rate was not fast and as noted in HPI.   Respiratory: no chronic cough, not coughing up sputum,  no wheezing that is consistent with asthma, no asthma, no orthopnea and no postural nocturnal dyspnea.   Gastrointestinal: no change in bowel habits, no blood in stools, no diarrhea, no constipation, no nausea, no vomiting, no abdominal pain, no signs and symptoms of ulcer disease, no jasmyne colored stools and no intolerance to fatty foods.   Genitourinary: no  hematuria,  no urinary frequency, no dysuria, no incontinence, no burning sensation during urination, no urinary hesitancy, no nocturia, no genital lesion, no testicular pain, urinary stream is not smaller and urinary stream does not start and stop.   Musculoskeletal: no arthralgias, no myalgias, no joint swelling, no joint stiffness, no muscle weakness, no back pain, no limb pain, no limb swelling and no difficulty walking.   Skin: no skin rashes, no change in skin color and pigmentation, no skin lesions and no skin lumps.   Neurological: no seizures, no frequent falls, no headaches, no dizziness, no tingling, no fainting and no limb weakness.   Psychiatric: no depression, not suicidal, no confusion, no memory lapses or loss, no anxiety, no personality change and no emotional problems.   Endocrine: no goiter, no thyroid disorder, no diabetes mellitus, no excessive thirst, no dry skin, no cold intolerance, no heat intolerance, no erectile dysfunction, no increased urinary frequency, no proptosis and no deepening of the voice.   Hematologic/Lymphatic: no bleeding issues.   All other systems have been reviewed and are negative for complaint.     Physical Exam:     Visit Vitals  Smoking Status Never        Constitutional: alert and in no acute distress.   Eyes: no erythema, swelling or discharge from the eye .   Ears, Nose, Mouth, and Throat: external inspection of ears and nose is normal , lips, teeth, and gums are normal with good dentition  and oropharynx normal with no erythema, edema, exudate or lesions .   Neck: neck is supple, symmetric, trachea midline, no masses  and no thyromegaly .   Pulmonary: no increased work of breathing or signs of respiratory distress , lungs clear to auscultation. , normal percussion of chest  and chest palpation normal .   Cardiovascular: RRR, no murmur,  no leg edema  Abdomen: abdomen non-tender, no masses  and no hepatomegaly .           Skin:  no skin lesions          Neurologic:  non-focal neurologic examination.      Psychiatric judgment and insight is normal , oriented to person, place and time , normal mood and affect .       Labs:    Results for orders placed or performed during the hospital encounter of 10/08/23   CBC and Auto Differential   Result Value Ref Range    WBC 10.2 4.4 - 11.3 x10*3/uL    nRBC 0.0 0.0 - 0.0 /100 WBCs    RBC 4.82 4.50 - 5.90 x10*6/uL    Hemoglobin 14.5 13.5 - 17.5 g/dL    Hematocrit 44.0 41.0 - 52.0 %    MCV 91 80 - 100 fL    MCH 30.1 26.0 - 34.0 pg    MCHC 33.0 32.0 - 36.0 g/dL    RDW 12.6 11.5 - 14.5 %    Platelets 217 150 - 450 x10*3/uL    MPV 9.8 7.5 - 11.5 fL    Neutrophils % 82.5 40.0 - 80.0 %    Immature Granulocytes %, Automated 0.4 0.0 - 0.9 %    Lymphocytes % 7.9 13.0 - 44.0 %    Monocytes % 7.8 2.0 - 10.0 %    Eosinophils % 1.0 0.0 - 6.0 %    Basophils % 0.4 0.0 - 2.0 %    Neutrophils Absolute 8.40 (H) 1.60 - 5.50 x10*3/uL    Immature Granulocytes Absolute, Automated 0.04 0.00 - 0.50 x10*3/uL    Lymphocytes Absolute 0.80 0.80 - 3.00 x10*3/uL    Monocytes Absolute 0.79 0.05 - 0.80 x10*3/uL    Eosinophils Absolute 0.10 0.00 - 0.40 x10*3/uL    Basophils Absolute 0.04 0.00 - 0.10 x10*3/uL   Comprehensive Metabolic Panel   Result Value Ref Range    Glucose 132 (H) 74 - 99 mg/dL    Sodium 134 (L) 136 - 145 mmol/L    Potassium 3.9 3.5 - 5.3 mmol/L    Chloride 100 98 - 107 mmol/L    Bicarbonate 27 21 - 32 mmol/L    Anion Gap 11 10 - 20 mmol/L    Urea Nitrogen 12 6 - 23 mg/dL    Creatinine 0.76 0.50 - 1.30 mg/dL    eGFR >90 >60 mL/min/1.73m*2    Calcium 9.5 8.6 - 10.3 mg/dL    Albumin 4.1 3.4 - 5.0 g/dL    Alkaline Phosphatase 51 33 - 136 U/L    Total Protein 6.7 6.4 - 8.2 g/dL    AST 27 9 - 39 U/L    Bilirubin, Total 0.5 0.0 - 1.2 mg/dL    ALT 27 10 - 52 U/L   Troponin I, High Sensitivity, Initial   Result Value Ref Range    Troponin I, High Sensitivity 6 0 - 20 ng/L   Troponin, High Sensitivity, 1 Hour   Result Value Ref Range    Troponin I, High  Sensitivity 6 0 - 20 ng/L          Medications:    Current Outpatient Medications   Medication Instructions    apixaban (ELIQUIS) 2.5 mg, oral, 2 times daily    atorvastatin (LIPITOR) 40 mg, oral, Daily    calcium carbonate-vitamin D3 600 mg-20 mcg (800 unit) tablet 2 tablets, oral, Daily    esomeprazole (NexIUM) 20 mg DR capsule 1 capsule, oral, Daily    hydroCHLOROthiazide (HYDRODIURIL) 12.5 mg, oral, Daily    ketoconazole (NIZOral) 2 % cream Apply 1 Application topically 2 times a day.    ketoconazole (NIZOral) 2 % shampoo Apply 1 Application topically every other day.    latanoprost (Xalatan) 0.005 % ophthalmic solution 1 drop, Both Eyes, Nightly    losartan (COZAAR) 50 mg, oral, Daily    multivitamin tablet 1 tablet, oral, Daily    sertraline (ZOLOFT) 50 mg, oral, Daily    tamsulosin (Flomax) 0.4 mg 24 hr capsule oral          Assessment:      This is a 73-year-old male with history of fall and significant bodily injury as well as atrial fibrillation.    The CHADS-VASC score is 4 and HAS-BLED score is 2. The patient is at increased risk of both bleeding and stroke.  As such the patient is a reasonable candidate for consideration of left atrial appendage occluder placement.    Today we discussed the left atrial appendage closure procedure. The patient was given written educational handout materials and watched an educational video. All risks, benefits and alternative were discussed.     The risks discussed included but were not limited to vascular complications, sedation related complications, risk of MI, CVA, device embolization, pericardial tamponade and death. The patient verbalized understanding and decided to proceed.        Plan will be for preprocedural cardiac CT. Following device implant, strategy will be for dual antiplatelet therapy with aspirin and clopidogrel for 6 months then aspirin for life.     Thank you, Dr. Gary, for this consultation and for allowing me to participate in the care of this  patient.

## 2024-01-10 ENCOUNTER — OFFICE VISIT (OUTPATIENT)
Dept: CARDIOLOGY | Facility: CLINIC | Age: 74
End: 2024-01-10
Payer: MEDICARE

## 2024-01-10 VITALS
DIASTOLIC BLOOD PRESSURE: 70 MMHG | TEMPERATURE: 97.5 F | BODY MASS INDEX: 25.48 KG/M2 | OXYGEN SATURATION: 98 % | HEIGHT: 70 IN | SYSTOLIC BLOOD PRESSURE: 148 MMHG | WEIGHT: 178 LBS | RESPIRATION RATE: 16 BRPM | HEART RATE: 72 BPM

## 2024-01-10 DIAGNOSIS — I48.91 ATRIAL FIBRILLATION, UNSPECIFIED TYPE (MULTI): ICD-10-CM

## 2024-01-10 DIAGNOSIS — Z91.81 AT HIGH RISK FOR INJURY RELATED TO FALL: ICD-10-CM

## 2024-01-10 PROCEDURE — 1159F MED LIST DOCD IN RCRD: CPT | Performed by: INTERNAL MEDICINE

## 2024-01-10 PROCEDURE — 99204 OFFICE O/P NEW MOD 45 MIN: CPT | Performed by: INTERNAL MEDICINE

## 2024-01-10 PROCEDURE — 3078F DIAST BP <80 MM HG: CPT | Performed by: INTERNAL MEDICINE

## 2024-01-10 PROCEDURE — 3077F SYST BP >= 140 MM HG: CPT | Performed by: INTERNAL MEDICINE

## 2024-01-10 PROCEDURE — 1125F AMNT PAIN NOTED PAIN PRSNT: CPT | Performed by: INTERNAL MEDICINE

## 2024-01-10 PROCEDURE — 1036F TOBACCO NON-USER: CPT | Performed by: INTERNAL MEDICINE

## 2024-01-10 PROCEDURE — 1160F RVW MEDS BY RX/DR IN RCRD: CPT | Performed by: INTERNAL MEDICINE

## 2024-01-10 PROCEDURE — 99214 OFFICE O/P EST MOD 30 MIN: CPT | Performed by: INTERNAL MEDICINE

## 2024-01-12 ENCOUNTER — TREATMENT (OUTPATIENT)
Dept: PHYSICAL THERAPY | Facility: CLINIC | Age: 74
End: 2024-01-12
Payer: MEDICARE

## 2024-01-12 DIAGNOSIS — M25.661 KNEE STIFFNESS, RIGHT: ICD-10-CM

## 2024-01-12 DIAGNOSIS — R26.2 DIFFICULTY WALKING: ICD-10-CM

## 2024-01-12 DIAGNOSIS — Z96.659 S/P TKR (TOTAL KNEE REPLACEMENT): ICD-10-CM

## 2024-01-12 PROCEDURE — 97110 THERAPEUTIC EXERCISES: CPT | Mod: GP,CQ

## 2024-01-12 ASSESSMENT — PAIN - FUNCTIONAL ASSESSMENT: PAIN_FUNCTIONAL_ASSESSMENT: 0-10

## 2024-01-12 ASSESSMENT — PAIN SCALES - GENERAL: PAINLEVEL_OUTOF10: 4

## 2024-01-12 NOTE — PROGRESS NOTES
"Physical Therapy Treatment    Patient Name: Bonifacio Hernandez  MRN: [id number]  Today's Date: 1/12/2024  Time Calculation  Start Time: 1319  Stop Time: 1359  Time Calculation (min): 40 min      Assessment:   Patient appropriately challenged. Demo's intermittent fingertip touching with SLS. Challenged with ball toss, and running man but demo's no exacerbation of symptoms. Demo's improvements in strength.     Plan:  Continue to work on improving strength and ROM to be able to ascend/descend stairs with reciprocal pattern with little to no difficulty. -AB.     OP PT Plan  Treatment/Interventions: Cryotherapy, Education/ Instruction, Electrical stimulation, Gait training, Hot pack, Manual therapy, Neuromuscular re-education, Self care/ home management, Therapeutic activities, Therapeutic exercises  PT Plan: Skilled PT  Rehab Potential: Excellent    Current Problem  Problem List Items Addressed This Visit             ICD-10-CM    Difficulty walking R26.2    Knee stiffness, right M25.661     Other Visit Diagnoses         Codes    S/P TKR (total knee replacement)     Z96.659            Subjective   General  General Comment: Visit: 11/12  Patient states that he does feel like he's getting stronger. States that he is doing his new exercises at home.     Precautions  Precautions  Precautions Comment: none    Pain  Pain Assessment: 0-10  Pain Score: 4  Pain Location: Knee  Pain Orientation: Right      Treatments:  Therapeutic Exercise: 38 minutes, 3 units  S/P R TKA 11/28/23      Bike full motion 6'   Slant Board Stretch 1' x 2   Step ups fwd/lateral 6\" 2 x 10   Prone hip extension x10 RLE (N) and HEP I/S  S/L hip abd x10 RLE (N) and HEP I/S  SLS 3 x 30\", level (X, not today)   SLS AIREX 30\" x3 (N) and HEP I/S  Wall slides with 3\" ball squeeze 2x10 (N) and HEP I/S  HSC blue band 2x15 (P band color and reps)  LAQ 2 x 15, 2.5#   SLS with ball toss/catch (level surface) (N)  Tandem stance (right foot in back) with ball toss/catch " "(N)  Leg lower off step (heel touch) 4\" step (trial of 6\" step but unable) 2x10 (P, reps)  Running man 3x30\" R (N)    HEP update with Handout  Gait training for form (heel strike IC, toe off TS)      Not today 1/8/23:  Fwd lunge Stretch on step 3 x 30\"   Marches on Airex 15x (N)  Standing Hip Abd 2x10 B/L on Airex   Standing Hip Ext 2x10 B/L on Airex   Heel Raise 2x10 on Airex   Fwd Lunges on Airex 10x each (N)  Step Overs onto Airex 10x each (N)  Side Stepping 2 laps (N)  Mini Squat 2x10   SLR 2 x 10 (P)  Heel Slide 15 x 10\" holds   Seated HS Stretch 2 x 30\"   Strap Stretch for R gastroc /hams 10x 5\" holds each (X)     Manual Therapy  Manual Therapy Performed: No (not this date)   PROM flex/ext  Patellar mobs not needed     OP EDUCATION:   Outpatient Education  Individual(s) Educated: Patient  Education Provided: Home Exercise Program  Diagnosis and Precautions: difficulty walking s/p R TKA 11/28/23, still has staples  Access Code: [email or web address]  URL: [email or web address]  Date: 01/08/2024  Prepared by: Tawny Ramon     Exercises  - Prone Hip Extension  - 1 x daily - 7 x weekly - 3 sets - 10 reps  - Sidelying Hip Abduction  - 1 x daily - 7 x weekly - 3 sets - 10 reps  - Single Leg Stance on Pillow  - 1 x daily - 7 x weekly - 3 sets - 10 reps  - Wall Quarter Squat  - 1 x daily - 7 x weekly - 3 sets - 10 reps    Goals:  Active       PT Problem       PT Goal 1       Start:  12/04/23    Expected End:  03/04/24       Patient will have improved AROM of right knee 0-120 deg to improve mobility..  4 weeks  MET  Patient will have good quad/VMO strength and full extension of knee to complete 10 reps SLR with no lag... 4 weeks  MET  Patient will be able single leg balance on level surface 10 sec without UE support RLE 2/3 trials...  4 weeks  GOOD PROGRESS  Patient will ambulate with good heel strike on initial contact and good push off at terminal stance using LRAD...  4 weeks PROGRESSINg  Patient will " ambulate with cane with good pattern and no limp... 8 weeks  ambulating without device but impaired pattern 1/8/24  Patient will be able to ambulate up/down 8 steps with reciprocal pattern and good control using one HR.. 8 weeks  Patient will have >/=4/5 strnegth grossly R LE to improve mobility...  8 weeks  Patient will ambulate independently without device, demonstrate WNL strength RLE and good gait pattern... 3 months PROGRESSING

## 2024-01-15 ENCOUNTER — OFFICE VISIT (OUTPATIENT)
Dept: PRIMARY CARE | Facility: CLINIC | Age: 74
End: 2024-01-15
Payer: MEDICARE

## 2024-01-15 ENCOUNTER — TREATMENT (OUTPATIENT)
Dept: PHYSICAL THERAPY | Facility: CLINIC | Age: 74
End: 2024-01-15
Payer: MEDICARE

## 2024-01-15 VITALS
DIASTOLIC BLOOD PRESSURE: 60 MMHG | WEIGHT: 178.9 LBS | BODY MASS INDEX: 25.61 KG/M2 | HEART RATE: 74 BPM | HEIGHT: 70 IN | OXYGEN SATURATION: 96 % | SYSTOLIC BLOOD PRESSURE: 144 MMHG

## 2024-01-15 DIAGNOSIS — Z96.659 S/P TKR (TOTAL KNEE REPLACEMENT): ICD-10-CM

## 2024-01-15 DIAGNOSIS — K21.9 GASTROESOPHAGEAL REFLUX DISEASE WITHOUT ESOPHAGITIS: ICD-10-CM

## 2024-01-15 DIAGNOSIS — Z00.00 ROUTINE GENERAL MEDICAL EXAMINATION AT HEALTH CARE FACILITY: Primary | ICD-10-CM

## 2024-01-15 DIAGNOSIS — M25.661 KNEE STIFFNESS, RIGHT: ICD-10-CM

## 2024-01-15 DIAGNOSIS — I48.0 PAROXYSMAL ATRIAL FIBRILLATION (MULTI): ICD-10-CM

## 2024-01-15 DIAGNOSIS — E78.2 MIXED HYPERLIPIDEMIA: ICD-10-CM

## 2024-01-15 DIAGNOSIS — F41.9 ANXIETY: ICD-10-CM

## 2024-01-15 DIAGNOSIS — R26.2 DIFFICULTY WALKING: ICD-10-CM

## 2024-01-15 DIAGNOSIS — I10 BENIGN ESSENTIAL HYPERTENSION: ICD-10-CM

## 2024-01-15 DIAGNOSIS — I63.339 CEREBROVASCULAR ACCIDENT (CVA) DUE TO THROMBOSIS OF POSTERIOR CEREBRAL ARTERY, UNSPECIFIED BLOOD VESSEL LATERALITY (MULTI): ICD-10-CM

## 2024-01-15 PROCEDURE — 1159F MED LIST DOCD IN RCRD: CPT | Performed by: FAMILY MEDICINE

## 2024-01-15 PROCEDURE — 1160F RVW MEDS BY RX/DR IN RCRD: CPT | Performed by: FAMILY MEDICINE

## 2024-01-15 PROCEDURE — 99214 OFFICE O/P EST MOD 30 MIN: CPT | Performed by: FAMILY MEDICINE

## 2024-01-15 PROCEDURE — 1125F AMNT PAIN NOTED PAIN PRSNT: CPT | Performed by: FAMILY MEDICINE

## 2024-01-15 PROCEDURE — G0439 PPPS, SUBSEQ VISIT: HCPCS | Performed by: FAMILY MEDICINE

## 2024-01-15 PROCEDURE — 1170F FXNL STATUS ASSESSED: CPT | Performed by: FAMILY MEDICINE

## 2024-01-15 PROCEDURE — 3077F SYST BP >= 140 MM HG: CPT | Performed by: FAMILY MEDICINE

## 2024-01-15 PROCEDURE — 3078F DIAST BP <80 MM HG: CPT | Performed by: FAMILY MEDICINE

## 2024-01-15 PROCEDURE — 1036F TOBACCO NON-USER: CPT | Performed by: FAMILY MEDICINE

## 2024-01-15 PROCEDURE — 97110 THERAPEUTIC EXERCISES: CPT | Mod: GP | Performed by: PHYSICAL THERAPIST

## 2024-01-15 ASSESSMENT — ACTIVITIES OF DAILY LIVING (ADL)
TAKING_MEDICATION: INDEPENDENT
BATHING: INDEPENDENT
DOING_HOUSEWORK: INDEPENDENT
DRESSING: INDEPENDENT
GROCERY_SHOPPING: INDEPENDENT
MANAGING_FINANCES: INDEPENDENT

## 2024-01-15 ASSESSMENT — PAIN - FUNCTIONAL ASSESSMENT: PAIN_FUNCTIONAL_ASSESSMENT: 0-10

## 2024-01-15 ASSESSMENT — PATIENT HEALTH QUESTIONNAIRE - PHQ9
2. FEELING DOWN, DEPRESSED OR HOPELESS: NOT AT ALL
1. LITTLE INTEREST OR PLEASURE IN DOING THINGS: NOT AT ALL
SUM OF ALL RESPONSES TO PHQ9 QUESTIONS 1 AND 2: 0

## 2024-01-15 ASSESSMENT — ENCOUNTER SYMPTOMS
DEPRESSION: 0
LOSS OF SENSATION IN FEET: 0
OCCASIONAL FEELINGS OF UNSTEADINESS: 1

## 2024-01-15 ASSESSMENT — PAIN SCALES - GENERAL: PAINLEVEL_OUTOF10: 2

## 2024-01-15 NOTE — PATIENT INSTRUCTIONS

## 2024-01-15 NOTE — PROGRESS NOTES
Physical Therapy    Physical Therapy Treatment    Patient Name: Bonifacio Hernandez  MRN: [id number]  Today's Date: 1/15/2024  Time Calculation  Start Time: 1250  Stop Time: 1346  Time Calculation (min): 56 min      Assessment:  Patient has attended 12 visits of outpatient PT from 12/4/23 to 1/15/24.  He has made excellent progress with intervention.  Patient has good AROM of right knee of 0-125deg.  He has much improved strength of RLE/knee (4+/5).  He is able to perform single leg stance for 6 seconds or more on his right leg on a level surface.  He is ambulating without an assistive device and demonstrates heel strike on initial contact and push off at terminal stance.  He is able to ascend and descend steps with reciprocal pattern using a rail with mild hesitancy (decreased eccentric control) especially with descending stairs.  Patient would benefit from additional PT for strengthening if he chooses however he reports he is able to do all of his ADLS without concern and feels he is 75-80% back to normal.  Patient reports he has to have a procedure on his heart next week and his spouse will have a hip replacement at the beginning of Feb so patient declines additional therapy at this time and plans to continue his HEP and see how he does the next couuple of weeks since they have these appointments coming up.  IF patient doesn't return within 30 days then he will be discharged and this will be his discharge summary.  He currently is compliant with his Hep and it has also been updated today.     PT Assessment  PT Assessment Results: Decreased strength, Impaired balance, Decreased mobility, Pain  Rehab Prognosis: Excellent  Plan:  OP PT Plan  Treatment/Interventions: Cryotherapy, Education/ Instruction, Electrical stimulation, Gait training, Hot pack, Manual therapy, Neuromuscular re-education, Self care/ home management, Therapeutic activities, Therapeutic exercises  PT Plan: Skilled PT  Rehab Potential: Excellent  Plan of  "Care Agreement: Patient  No further appointments scheduled.  HOLD for 30 days to continue HEP and see how he does. IF no further therapy in 30 days then DC to HEP only.    Current Problem  1. S/P TKR (total knee replacement)  Follow Up In Physical Therapy      2. Difficulty walking  Follow Up In Physical Therapy      3. Knee stiffness, right  Follow Up In Physical Therapy          General  PT  Visit  PT Received On: 01/15/24  General  Reason for Referral: s/p R TKA  Referred By: RONEL Dhillon  Past Medical History Relevant to Rehab: L TKA 2014; afib  General Comment: visit 12/12;  states he has been doing his (new )HEP exercises at home    Subjective    Precautions   none    Pain  Pain Assessment  Pain Assessment: 0-10  Pain Score: 2 (right knee)    Objective   Cognition  Cognition  Overall Cognitive Status: Within Functional Limits    AROM R Knee =0-125deg    SLS 6 second on level surface, 8 sec on AIREX without UE support  Able to complete 10 unilateral heel raises on R leg with small amplitude  Heel touch off step is still challenging (eccentric control) but able to complete with touch down on heel    MMT:  R hip: prone hip ext=4+/5, supine hip flex 4+/5, S/L hip abd 4+/5  R Knee flexion=5-/5, knee extension=4+to 5-/5    Gait: striking heel on initial contact.  End of session with fatigue noted mild limp with muscle fatigue and soreness from exercise (patient will ice at home)    Outcome Measures:  Other Measures  Lower Extremity Funtional Score (LEFS): 62/80  Treatments:  Therapeutic Exercise  Therapeutic Exercise Performed: [place]/P R TKA 11/28/23      Bike full motion level 2, 5'   Slant Board Stretch 1' x 2 X  Step ups fwd/lateral 6\" 2 x 10 X  Prone hip extension and reviewd for HEP  S/L hip abd reviewed for HEP  SLS 3 x 30\", level   SLS AIREX 30\" x2  Unilateral heel raise 2x10 (N)  Wall slides with 5\" ball squeeze x10 (P)   HSC blue band 2x15 (P band color and reps)X  LAQ 2 x 15, 2.5# X  SLS with ball " "toss/catch (level surface) X  Tandem stance (right foot in back) with ball toss/catch X  Leg lower off step (heel touch) 4\" step  2x10   Running man 3x30\" R X  BM leg press:  BL LE 75# x15, U/L RLE 30# x15 (N)  HEP update with Handout  Gait training for form (heel strike IC, toe off TS)      Not today 1/8/23:  Fwd lunge Stretch on step 3 x 30\"   Marches on Airex 15x (N)  Standing Hip Abd 2x10 B/L on Airex   Standing Hip Ext 2x10 B/L on Airex   Heel Raise 2x10 on Airex   Fwd Lunges on Airex 10x each (N)  Step Overs onto Airex 10x each (N)  Side Stepping 2 laps (N)  Mini Squat 2x10   SLR 2 x 10 (P)  Heel Slide 15 x 10\" holds   Seated HS Stretch 2 x 30\"   Strap Stretch for R gastroc /hams 10x 5\" holds each (X)     Manual Therapy  Manual Therapy Performed: No (not this date)   PROM flex/ext  Patellar mobs not needed       OP EDUCATION:  Outpatient Education  Individual(s) Educated: Patient  Education Provided: Home Exercise Program  Diagnosis and Precautions: difficulty walking s/p R TKA 11/28/23, still has staples  Education Comment: added unilateral heel raises and leg lower off step to HEP  Access Code: [email or web address]  URL: [email or web address]  Date: 01/15/2024  Prepared by: Tawny Ramon    Exercises  - Prone Hip Extension  - 1 x daily - 7 x weekly - 3 sets - 10 reps  - Sidelying Hip Abduction  - 1 x daily - 7 x weekly - 3 sets - 10 reps  - Single Leg Stance on Pillow  - 2 x daily - 7 x weekly - 3 sets - 10 reps  - Wall Quarter Squat  - 2 x daily - 7 x weekly - 3 sets - 10 reps  - Single Leg Heel Raise with Counter Support  - 2 x daily - 7 x weekly - 2 sets - 10 reps  - Small-Range Single-Leg Squat on Step  - 2 x daily - 7 x weekly - 2 sets - 10 reps  Goals:  Active       PT Problem       PT Goal 1       Start:  12/04/23    Expected End:  03/04/24       Patient will have improved AROM of right knee 0-120 deg to improve mobility..  4 weeks  MET  Patient will have good quad/VMO strength and full " extension of knee to complete 10 reps SLR with no lag... 4 weeks  MET  Patient will be able single leg balance on level surface 10 sec without UE support RLE 2/3 trials...  4 weeks  GOOD PROGRESS  Patient will ambulate with good heel strike on initial contact and good push off at terminal stance using LRAD...  4 weeks MET  Patient will ambulate with cane with good pattern and no limp... MET  Patient will be able to ambulate up/down 8 steps with reciprocal pattern and good control using one HR.. 8 weeks, GOOD PROGRESS, able to use reciprocal pattern, mild hesitancy with decending control  Patient will have >/=4/5 strnegth grossly R LE to improve mobility...  8 weeks MET  Patient will ambulate independently without device, demonstrate WNL strength RLE and good gait pattern... 3 months MET

## 2024-01-22 PROBLEM — I48.91 ATRIAL FIBRILLATION, UNSPECIFIED TYPE (MULTI): Status: ACTIVE | Noted: 2024-01-22

## 2024-01-22 NOTE — DISCHARGE INSTRUCTIONS
Anticoagulation Plan: Plavix and 81mg Aspirin for 6 months, then aspirin for life    Do not drive or lift anything heavier than 10lbs for 1 week, or until groin is healed    You will have CT imaging completed in 4 months to assess the effectiveness of the Watchman Device. You will receive a call (within 45 days) in regards to timing of 4 month CT.     Please follow up with your primary cardiologist or PCP in 1-2 weeks     No elective dental procedures or cleanings for 3 months post procedure  You will need dental prophylaxis prior to dental work/cleanings for 6 months     Please call our nurse line for any questions or concerns: [phone]

## 2024-01-23 ENCOUNTER — APPOINTMENT (OUTPATIENT)
Dept: CARDIOLOGY | Facility: HOSPITAL | Age: 74
DRG: 274 | End: 2024-01-23
Payer: MEDICARE

## 2024-01-23 ENCOUNTER — HOSPITAL ENCOUNTER (INPATIENT)
Facility: HOSPITAL | Age: 74
LOS: 1 days | Discharge: HOME | DRG: 274 | End: 2024-01-23
Attending: INTERNAL MEDICINE | Admitting: INTERNAL MEDICINE
Payer: MEDICARE

## 2024-01-23 ENCOUNTER — HOSPITAL ENCOUNTER (OUTPATIENT)
Dept: RADIOLOGY | Facility: HOSPITAL | Age: 74
Discharge: HOME | DRG: 274 | End: 2024-01-23
Payer: MEDICARE

## 2024-01-23 VITALS
SYSTOLIC BLOOD PRESSURE: 166 MMHG | OXYGEN SATURATION: 98 % | DIASTOLIC BLOOD PRESSURE: 57 MMHG | HEART RATE: 66 BPM | RESPIRATION RATE: 16 BRPM

## 2024-01-23 DIAGNOSIS — I48.91 ATRIAL FIBRILLATION, UNSPECIFIED TYPE (MULTI): ICD-10-CM

## 2024-01-23 DIAGNOSIS — I48.91 ATRIAL FIBRILLATION, UNSPECIFIED TYPE (MULTI): Primary | ICD-10-CM

## 2024-01-23 DIAGNOSIS — Z09 POSTOP CHECK: ICD-10-CM

## 2024-01-23 DIAGNOSIS — Z95.818 PRESENCE OF WATCHMAN LEFT ATRIAL APPENDAGE CLOSURE DEVICE: ICD-10-CM

## 2024-01-23 DIAGNOSIS — Z01.810 ENCOUNTER FOR PREPROCEDURAL CARDIOVASCULAR EXAMINATION: ICD-10-CM

## 2024-01-23 DIAGNOSIS — I48.20 CHRONIC ATRIAL FIBRILLATION (MULTI): ICD-10-CM

## 2024-01-23 PROCEDURE — 93005 ELECTROCARDIOGRAM TRACING: CPT

## 2024-01-23 PROCEDURE — C1759 CATH, INTRA ECHOCARDIOGRAPHY: HCPCS | Performed by: INTERNAL MEDICINE

## 2024-01-23 PROCEDURE — 85347 COAGULATION TIME ACTIVATED: CPT | Performed by: INTERNAL MEDICINE

## 2024-01-23 PROCEDURE — 99153 MOD SED SAME PHYS/QHP EA: CPT | Performed by: INTERNAL MEDICINE

## 2024-01-23 PROCEDURE — 2720000007 HC OR 272 NO HCPCS: Performed by: INTERNAL MEDICINE

## 2024-01-23 PROCEDURE — C1889 IMPLANT/INSERT DEVICE, NOC: HCPCS | Performed by: INTERNAL MEDICINE

## 2024-01-23 PROCEDURE — 1200000002 HC GENERAL ROOM WITH TELEMETRY DAILY

## 2024-01-23 PROCEDURE — C1894 INTRO/SHEATH, NON-LASER: HCPCS | Performed by: INTERNAL MEDICINE

## 2024-01-23 PROCEDURE — 93308 TTE F-UP OR LMTD: CPT

## 2024-01-23 PROCEDURE — 85347 COAGULATION TIME ACTIVATED: CPT

## 2024-01-23 PROCEDURE — 33340 PERQ CLSR TCAT L ATR APNDGE: CPT | Performed by: INTERNAL MEDICINE

## 2024-01-23 PROCEDURE — 02L73DK OCCLUSION OF LEFT ATRIAL APPENDAGE WITH INTRALUMINAL DEVICE, PERCUTANEOUS APPROACH: ICD-10-PCS | Performed by: INTERNAL MEDICINE

## 2024-01-23 PROCEDURE — 93308 TTE F-UP OR LMTD: CPT | Performed by: INTERNAL MEDICINE

## 2024-01-23 PROCEDURE — 2550000001 HC RX 255 CONTRASTS: Performed by: INTERNAL MEDICINE

## 2024-01-23 PROCEDURE — 2500000005 HC RX 250 GENERAL PHARMACY W/O HCPCS: Performed by: INTERNAL MEDICINE

## 2024-01-23 PROCEDURE — 93662 INTRACARDIAC ECG (ICE): CPT | Performed by: INTERNAL MEDICINE

## 2024-01-23 PROCEDURE — 99152 MOD SED SAME PHYS/QHP 5/>YRS: CPT | Performed by: INTERNAL MEDICINE

## 2024-01-23 PROCEDURE — 2500000001 HC RX 250 WO HCPCS SELF ADMINISTERED DRUGS (ALT 637 FOR MEDICARE OP): Performed by: INTERNAL MEDICINE

## 2024-01-23 PROCEDURE — 2780000003 HC OR 278 NO HCPCS: Performed by: INTERNAL MEDICINE

## 2024-01-23 PROCEDURE — C1893 INTRO/SHEATH, FIXED,NON-PEEL: HCPCS | Performed by: INTERNAL MEDICINE

## 2024-01-23 PROCEDURE — 75572 CT HRT W/3D IMAGE: CPT | Performed by: RADIOLOGY

## 2024-01-23 PROCEDURE — 93010 ELECTROCARDIOGRAM REPORT: CPT | Performed by: INTERNAL MEDICINE

## 2024-01-23 PROCEDURE — G0269 OCCLUSIVE DEVICE IN VEIN ART: HCPCS | Mod: TC,59 | Performed by: INTERNAL MEDICINE

## 2024-01-23 PROCEDURE — 7100000010 HC PHASE TWO TIME - EACH INCREMENTAL 1 MINUTE: Performed by: INTERNAL MEDICINE

## 2024-01-23 PROCEDURE — 2500000004 HC RX 250 GENERAL PHARMACY W/ HCPCS (ALT 636 FOR OP/ED): Performed by: NURSE PRACTITIONER

## 2024-01-23 PROCEDURE — C1760 CLOSURE DEV, VASC: HCPCS | Performed by: INTERNAL MEDICINE

## 2024-01-23 PROCEDURE — 2500000004 HC RX 250 GENERAL PHARMACY W/ HCPCS (ALT 636 FOR OP/ED): Performed by: INTERNAL MEDICINE

## 2024-01-23 PROCEDURE — 2500000001 HC RX 250 WO HCPCS SELF ADMINISTERED DRUGS (ALT 637 FOR MEDICARE OP): Performed by: NURSE PRACTITIONER

## 2024-01-23 PROCEDURE — 75572 CT HRT W/3D IMAGE: CPT

## 2024-01-23 PROCEDURE — 7100000009 HC PHASE TWO TIME - INITIAL BASE CHARGE: Performed by: INTERNAL MEDICINE

## 2024-01-23 DEVICE — ACCESS SHEATH WITH DILATOR
Type: IMPLANTABLE DEVICE | Status: NON-FUNCTIONAL
Brand: WATCHMAN FXD CURVE™ ACCESS SYSTEM

## 2024-01-23 DEVICE — LEFT ATRIAL APPENDAGE CLOSURE DEVICE WITH DELIVERY SYSTEM
Type: IMPLANTABLE DEVICE | Site: HEART | Status: FUNCTIONAL
Brand: WATCHMAN FLX™

## 2024-01-23 RX ORDER — NAPROXEN SODIUM 220 MG/1
324 TABLET, FILM COATED ORAL ONCE
Status: COMPLETED | OUTPATIENT
Start: 2024-01-23 | End: 2024-01-23

## 2024-01-23 RX ORDER — PROCHLORPERAZINE EDISYLATE 5 MG/ML
10 INJECTION INTRAMUSCULAR; INTRAVENOUS EVERY 6 HOURS PRN
Status: DISCONTINUED | OUTPATIENT
Start: 2024-01-23 | End: 2024-01-23 | Stop reason: HOSPADM

## 2024-01-23 RX ORDER — PANTOPRAZOLE SODIUM 40 MG/1
40 TABLET, DELAYED RELEASE ORAL
Status: DISCONTINUED | OUTPATIENT
Start: 2024-01-24 | End: 2024-01-23 | Stop reason: HOSPADM

## 2024-01-23 RX ORDER — MIDAZOLAM HYDROCHLORIDE 1 MG/ML
INJECTION INTRAMUSCULAR; INTRAVENOUS AS NEEDED
Status: DISCONTINUED | OUTPATIENT
Start: 2024-01-23 | End: 2024-01-23 | Stop reason: HOSPADM

## 2024-01-23 RX ORDER — CLOPIDOGREL BISULFATE 75 MG/1
75 TABLET ORAL DAILY
Qty: 90 TABLET | Refills: 1 | Status: SHIPPED | OUTPATIENT
Start: 2024-01-23

## 2024-01-23 RX ORDER — TRAMADOL HYDROCHLORIDE 50 MG/1
50 TABLET ORAL EVERY 6 HOURS PRN
Status: DISCONTINUED | OUTPATIENT
Start: 2024-01-23 | End: 2024-01-23 | Stop reason: HOSPADM

## 2024-01-23 RX ORDER — ASPIRIN 81 MG/1
81 TABLET ORAL DAILY
Status: DISCONTINUED | OUTPATIENT
Start: 2024-01-23 | End: 2024-01-23 | Stop reason: HOSPADM

## 2024-01-23 RX ORDER — CEFAZOLIN SODIUM 2 G/100ML
2 INJECTION, SOLUTION INTRAVENOUS ONCE
Status: COMPLETED | OUTPATIENT
Start: 2024-01-23 | End: 2024-01-23

## 2024-01-23 RX ORDER — PROCHLORPERAZINE 25 MG/1
25 SUPPOSITORY RECTAL EVERY 12 HOURS PRN
Status: DISCONTINUED | OUTPATIENT
Start: 2024-01-23 | End: 2024-01-23 | Stop reason: HOSPADM

## 2024-01-23 RX ORDER — ACETAMINOPHEN 650 MG/1
650 SUPPOSITORY RECTAL EVERY 6 HOURS PRN
Status: DISCONTINUED | OUTPATIENT
Start: 2024-01-23 | End: 2024-01-23 | Stop reason: HOSPADM

## 2024-01-23 RX ORDER — CLOPIDOGREL BISULFATE 300 MG/1
TABLET, FILM COATED ORAL AS NEEDED
Status: DISCONTINUED | OUTPATIENT
Start: 2024-01-23 | End: 2024-01-23 | Stop reason: HOSPADM

## 2024-01-23 RX ORDER — LIDOCAINE HYDROCHLORIDE 10 MG/ML
INJECTION, SOLUTION EPIDURAL; INFILTRATION; INTRACAUDAL; PERINEURAL AS NEEDED
Status: DISCONTINUED | OUTPATIENT
Start: 2024-01-23 | End: 2024-01-23 | Stop reason: HOSPADM

## 2024-01-23 RX ORDER — HEPARIN SODIUM 1000 [USP'U]/ML
INJECTION, SOLUTION INTRAVENOUS; SUBCUTANEOUS AS NEEDED
Status: DISCONTINUED | OUTPATIENT
Start: 2024-01-23 | End: 2024-01-23 | Stop reason: HOSPADM

## 2024-01-23 RX ORDER — DOCUSATE SODIUM 100 MG/1
100 CAPSULE, LIQUID FILLED ORAL 2 TIMES DAILY
Status: DISCONTINUED | OUTPATIENT
Start: 2024-01-23 | End: 2024-01-23 | Stop reason: HOSPADM

## 2024-01-23 RX ORDER — FENTANYL CITRATE 50 UG/ML
INJECTION, SOLUTION INTRAMUSCULAR; INTRAVENOUS AS NEEDED
Status: DISCONTINUED | OUTPATIENT
Start: 2024-01-23 | End: 2024-01-23 | Stop reason: HOSPADM

## 2024-01-23 RX ORDER — ACETAMINOPHEN 160 MG/5ML
650 SOLUTION ORAL EVERY 6 HOURS PRN
Status: DISCONTINUED | OUTPATIENT
Start: 2024-01-23 | End: 2024-01-23 | Stop reason: HOSPADM

## 2024-01-23 RX ORDER — PROCHLORPERAZINE MALEATE 10 MG
10 TABLET ORAL EVERY 6 HOURS PRN
Status: DISCONTINUED | OUTPATIENT
Start: 2024-01-23 | End: 2024-01-23 | Stop reason: HOSPADM

## 2024-01-23 RX ORDER — PROTAMINE SULFATE 10 MG/ML
INJECTION, SOLUTION INTRAVENOUS CONTINUOUS PRN
Status: COMPLETED | OUTPATIENT
Start: 2024-01-23 | End: 2024-01-23

## 2024-01-23 RX ORDER — PANTOPRAZOLE SODIUM 40 MG/10ML
40 INJECTION, POWDER, LYOPHILIZED, FOR SOLUTION INTRAVENOUS
Status: DISCONTINUED | OUTPATIENT
Start: 2024-01-24 | End: 2024-01-23 | Stop reason: HOSPADM

## 2024-01-23 RX ORDER — SODIUM CHLORIDE, SODIUM LACTATE, POTASSIUM CHLORIDE, CALCIUM CHLORIDE 600; 310; 30; 20 MG/100ML; MG/100ML; MG/100ML; MG/100ML
75 INJECTION, SOLUTION INTRAVENOUS CONTINUOUS
Status: SHIPPED | OUTPATIENT
Start: 2024-01-23 | End: 2024-01-23

## 2024-01-23 RX ORDER — NAPROXEN SODIUM 220 MG/1
81 TABLET, FILM COATED ORAL DAILY
Qty: 90 TABLET | Refills: 3 | Status: SHIPPED | OUTPATIENT
Start: 2024-01-23

## 2024-01-23 RX ORDER — ACETAMINOPHEN 325 MG/1
650 TABLET ORAL EVERY 6 HOURS PRN
Status: DISCONTINUED | OUTPATIENT
Start: 2024-01-23 | End: 2024-01-23 | Stop reason: HOSPADM

## 2024-01-23 RX ADMIN — IOHEXOL 70 ML: 350 INJECTION, SOLUTION INTRAVENOUS at 11:32

## 2024-01-23 RX ADMIN — ASPIRIN 81 MG CHEWABLE TABLET 324 MG: 81 TABLET CHEWABLE at 11:30

## 2024-01-23 ASSESSMENT — COLUMBIA-SUICIDE SEVERITY RATING SCALE - C-SSRS
2. HAVE YOU ACTUALLY HAD ANY THOUGHTS OF KILLING YOURSELF?: NO
6. HAVE YOU EVER DONE ANYTHING, STARTED TO DO ANYTHING, OR PREPARED TO DO ANYTHING TO END YOUR LIFE?: NO
1. IN THE PAST MONTH, HAVE YOU WISHED YOU WERE DEAD OR WISHED YOU COULD GO TO SLEEP AND NOT WAKE UP?: NO

## 2024-01-23 NOTE — Clinical Note
Sheath was exchanged with ACCESS KIT, S-MICHAELA MINI, 4FR 10CM 0.018IN 40CM, NT/PT, ECHO ENHANCE NEEDLE.

## 2024-01-23 NOTE — DISCHARGE SUMMARY
Discharge Diagnosis  Afib (CMS/[facility])        Test Results Pending At Discharge  Pending Labs       No current pending labs.            Hospital Course   73 years old male who came for elective left atrial appendage closure.  The patient underwent successful procedure using 20 Watchman device.  No complications were observed during the procedure.    The patient will be monitored for a few hours for the groin site, then will be ambulated and an echocardiogram will be performed.  If all of those steps are reassuring patient will be discharged home later today.    The patient will continue dual antiplatelet therapy for 6 months, antibiotic prophylaxis for 6 months.  They will be discharged with follow-up CT and clinic visit instructions.        Home Medications     Medication List      START taking these medications     aspirin 81 mg chewable tablet; Chew 1 tablet (81 mg) once daily.   clopidogrel 75 mg tablet; Commonly known as: Plavix; Take 1 tablet (75   mg) by mouth once daily.     CONTINUE taking these medications     atorvastatin 40 mg tablet; Commonly known as: Lipitor; Take 1 tablet (40   mg) by mouth once daily.   hydroCHLOROthiazide 12.5 mg tablet; Commonly known as: HYDRODiuril; Take   1 tablet (12.5 mg) by mouth once daily.   * ketoconazole 2 % cream; Commonly known as: NIZOral   * ketoconazole 2 % shampoo; Commonly known as: NIZOral   latanoprost 0.005 % ophthalmic solution; Commonly known as: Xalatan   losartan 50 mg tablet; Commonly known as: Cozaar; Take 1 tablet (50 mg)   by mouth once daily.   sertraline 50 mg tablet; Commonly known as: Zoloft; Take 1 tablet (50   mg) by mouth once daily.   tamsulosin 0.4 mg 24 hr capsule; Commonly known as: Flomax  * This list has 2 medication(s) that are the same as other medications   prescribed for you. Read the directions carefully, and ask your doctor or   other care provider to review them with you.     STOP taking these medications     apixaban 2.5 mg tablet;  Commonly known as: Eliquis       Outpatient Follow-Up  Future Appointments   Date Time Provider Department Center   2/22/2024 11:15 AM Papito Gary MD [id number] [facility]   5/24/2024 10:00 AM ELY [id number] CT 1 [id number] [facility]    7/23/2024  2:40 PM Carlos Estrada MD [id number] [facility]   10/17/2024  1:15 PM Papito Gary MD [id number] [facility]       Manda Durham MD

## 2024-01-23 NOTE — POST-PROCEDURE NOTE
Physician Transition of Care Summary  Invasive Cardiovascular Lab    Procedure Date: 1/23/2024  Attending:    * Bj Robles - Primary  Resident/Fellow/Other Assistant: Surgeon(s) and Role:     * Manda Durham MD - Fellow    Indications:   Pre-op Diagnosis     * Atrial fibrillation, unspecified type (CMS/HCC) [I48.91]    Post-procedure diagnosis:   Post-op Diagnosis     * Atrial fibrillation, unspecified type (CMS/HCC) [I48.91]    Procedure(s):   LAAO (Left Atrial Appendage Occlusion)  67844 - ID PERQ CLSR TCAT L ATR APNDGE W/ENDOCARDIAL IMPLNT    ID PERQ CLSR TCAT L ATR APNDGE W/ENDOCARDIAL IMPLNT [[id number]]        Description of the Procedure:   S/p SISSY closure    Access: Dual right femoral vein access  Closure:               Primary : Perclose               Secondary: Vascade    Device: After confirmation of the device position on fluoroscopy and ICE, anchor with a tug test, compression and seal a 20 mm Watchman was successfully deployed without any immediate complications.     No effusion on ICE was present pre and post watchman deployment.     Recommendations:   DAPT   CT in 4 months  Tele  access site monitoring.   TTE   Likely discharge home today once recovery and monitoring period is complete.        Complications:   None    Stents/Implants:   Cardiovascular Implants       Other Cardiac Implant    Device, Closure, 20mm Watchman Flx Laac - [id number] - Implanted        Inventory item: DEVICE, CLOSURE, 20MM WATCHMAN FLX LAAC Model/Cat number: [id number]    : [employer] Lot number: [id number]    Device identifier: [id number]        As of 1/23/2024       Status: Implanted                              Anticoagulation/Antiplatelet Plan:   DAPT    Estimated Blood Loss:   10 mL    Anesthesia: Moderate Sedation Anesthesia Staff: No anesthesia staff entered.    Any Specimen(s) Removed:   Order Name Source Comment Collection Info Order Time   TYPE AND SCREEN Blood, Venous   1/23/2024 11:28 AM      Release result to [facility]   Immediate        ABORH Blood, Venous   1/23/2024 11:28 AM     Release result to [facility]   Immediate            Disposition:   Home      Electronically signed by: Manda Durham MD, 1/23/2024 1:30 PM

## 2024-01-23 NOTE — PROGRESS NOTES
Pharmacy Medication History Review    Bonifacio Hernandez is a 73 y.o. male admitted for Afib (CMS/[facility]ly [facility]). Pharmacy reviewed the patient's [id number] medications and allergies for accuracy.    The list below reflects the updated PTA list. Comments regarding how patient may be taking medications differently can be found in the Admit Orders Activity  Prior to Admission Medications   Prescriptions Last Dose Informant Patient Reported?   apixaban (Eliquis) 2.5 mg tablet 1/19/2024 Self No   Sig: Take 1 tablet (2.5 mg) by mouth 2 times a day.   atorvastatin (Lipitor) 40 mg tablet 1/23/2024 Self No   Sig: Take 1 tablet (40 mg) by mouth once daily.   hydroCHLOROthiazide (HYDRODiuril) 12.5 mg tablet 1/22/2024 Self No   Sig: Take 1 tablet (12.5 mg) by mouth once daily.   ketoconazole (NIZOral) 2 % cream 1/22/2024 Self Yes   Sig: Apply 1 Application topically 2 times a day.   ketoconazole (NIZOral) 2 % shampoo 1/22/2024 Self Yes   Sig: Apply 1 Application topically every other day.   latanoprost (Xalatan) 0.005 % ophthalmic solution 1/22/2024 Self Yes   Sig: Administer 1 drop into both eyes once daily at bedtime.   losartan (Cozaar) 50 mg tablet 1/22/2024 Self No   Sig: Take 1 tablet (50 mg) by mouth once daily.   sertraline (Zoloft) 50 mg tablet 1/22/2024 Self No   Sig: Take 1 tablet (50 mg) by mouth once daily.   tamsulosin (Flomax) 0.4 mg 24 hr capsule 1/22/2024 Self Yes   Sig: Take 1 capsule (0.4 mg) by mouth once daily.      Facility-Administered Medications: None        The list below reflects the updated allergy list. Please review each documented allergy for additional clarification and justification.  Allergies  Reviewed by Annetta Webb RN on 1/23/2024        Severity Reactions Comments    Bee Pollen Not Specified Other, Runny nose     Adhesive Tape-silicones Low Other, Rash EKG adhesive    Lisinopril Low Cough, Other cough            Patient declines M2B at discharge.    Sources used to complete the med history  include out patient fill history, OARRS, and patient interview along with office visit primary care 1/15/24 Dr. Estrada.      Below are additional concerns with the patient's PTA list.      José Miguel Mir [facility]  Transitions of Care Clinical Pharmacist  Please reach out via Epic Chat for questions, if no response call  [phone] or [facility]

## 2024-01-23 NOTE — H&P
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  Patient has been on anticoagulation with Eliquis.  He has normal LV systolic function with left ventricular ejection fraction of 55 to 60% based on transthoracic echocardiogram November 2022.  He has no history of significant valvular heart disease.     In October 2023 the patient had significant fall event with facial trauma and laceration of the left thumb.  He still has episodes of dizziness but has not had any recurrent falls.     Given the patient's significant fall,  the patient is here for left atrial appendage closure for stroke risk reduction.         ROS:  Constitutional: not feeling tired, not feeling poorly, no fever and no chills.   Eyes: no eyesight problems, no blurred vision, no diplopia, no eye pain, no purulent discharge from the eyes, eyes not red, no dryness of the eyes and no itching of the eyes.   ENT: no nosebleeds, no hearing loss, no tinnitus, no earache, no sore throat, no hoarseness, no swollen glands in the neck and no nasal discharge.   Cardiovascular: no intermittent leg claudication, no chest pain, no tightness or heavy pressure, no shortness of breath, no palpitations, no lower extremity edema, the heart rate was not slow, the heart rate was not fast and as noted in HPI.   Respiratory: no chronic cough, not coughing up sputum,  no wheezing that is consistent with asthma, no asthma, no orthopnea and no postural nocturnal dyspnea.   Gastrointestinal: no change in bowel habits, no blood in stools, no diarrhea, no constipation, no nausea, no vomiting, no abdominal pain, no signs and symptoms of ulcer disease, no jasmyne colored stools and no intolerance to fatty foods.   Genitourinary: no hematuria,  no urinary frequency, no dysuria, no incontinence, no burning sensation during urination, no urinary hesitancy, no nocturia, no  genital lesion, no testicular pain, urinary stream is not smaller and urinary stream does not start and stop.   Musculoskeletal: no arthralgias, no myalgias, no joint swelling, no joint stiffness, no muscle weakness, no back pain, no limb pain, no limb swelling and no difficulty walking.   Skin: no skin rashes, no change in skin color and pigmentation, no skin lesions and no skin lumps.   Neurological: no seizures, no frequent falls, no headaches, no dizziness, no tingling, no fainting and no limb weakness.   Psychiatric: no depression, not suicidal, no confusion, no memory lapses or loss, no anxiety, no personality change and no emotional problems.   Endocrine: no goiter, no thyroid disorder, no diabetes mellitus, no excessive thirst, no dry skin, no cold intolerance, no heat intolerance, no erectile dysfunction, no increased urinary frequency, no proptosis and no deepening of the voice.   Hematologic/Lymphatic: no bleeding issues.   All other systems have been reviewed and are negative for complaint.      Physical Exam:      Visit Vitals  VS: reviewed  Constitutional: alert and in no acute distress.   Eyes: no erythema, swelling or discharge from the eye .   Ears, Nose, Mouth, and Throat: external inspection of ears and nose is normal , lips, teeth, and gums are normal with good dentition  and oropharynx normal with no erythema, edema, exudate or lesions .   Neck: neck is supple, symmetric, trachea midline, no masses  and no thyromegaly .   Pulmonary: no increased work of breathing or signs of respiratory distress , lungs clear to auscultation. , normal percussion of chest  and chest palpation normal .   Cardiovascular: RRR, no murmur,  no leg edema  Abdomen: abdomen non-tender, no masses  and no hepatomegaly .           Skin:  no skin lesions          Neurologic: non-focal neurologic examination.      Psychiatric judgment and insight is normal , oriented to person, place and time , normal mood and affect .          Labs:           Results for orders placed or performed during the hospital encounter of 10/08/23   CBC and Auto Differential   Result Value Ref Range     WBC 10.2 4.4 - 11.3 x10*3/uL     nRBC 0.0 0.0 - 0.0 /100 WBCs     RBC 4.82 4.50 - 5.90 x10*6/uL     Hemoglobin 14.5 13.5 - 17.5 g/dL     Hematocrit 44.0 41.0 - 52.0 %     MCV 91 80 - 100 fL     MCH 30.1 26.0 - 34.0 pg     MCHC 33.0 32.0 - 36.0 g/dL     RDW 12.6 11.5 - 14.5 %     Platelets 217 150 - 450 x10*3/uL     MPV 9.8 7.5 - 11.5 fL     Neutrophils % 82.5 40.0 - 80.0 %     Immature Granulocytes %, Automated 0.4 0.0 - 0.9 %     Lymphocytes % 7.9 13.0 - 44.0 %     Monocytes % 7.8 2.0 - 10.0 %     Eosinophils % 1.0 0.0 - 6.0 %     Basophils % 0.4 0.0 - 2.0 %     Neutrophils Absolute 8.40 (H) 1.60 - 5.50 x10*3/uL     Immature Granulocytes Absolute, Automated 0.04 0.00 - 0.50 x10*3/uL     Lymphocytes Absolute 0.80 0.80 - 3.00 x10*3/uL     Monocytes Absolute 0.79 0.05 - 0.80 x10*3/uL     Eosinophils Absolute 0.10 0.00 - 0.40 x10*3/uL     Basophils Absolute 0.04 0.00 - 0.10 x10*3/uL   Comprehensive Metabolic Panel   Result Value Ref Range     Glucose 132 (H) 74 - 99 mg/dL     Sodium 134 (L) 136 - 145 mmol/L     Potassium 3.9 3.5 - 5.3 mmol/L     Chloride 100 98 - 107 mmol/L     Bicarbonate 27 21 - 32 mmol/L     Anion Gap 11 10 - 20 mmol/L     Urea Nitrogen 12 6 - 23 mg/dL     Creatinine 0.76 0.50 - 1.30 mg/dL     eGFR >90 >60 mL/min/1.73m*2     Calcium 9.5 8.6 - 10.3 mg/dL     Albumin 4.1 3.4 - 5.0 g/dL     Alkaline Phosphatase 51 33 - 136 U/L     Total Protein 6.7 6.4 - 8.2 g/dL     AST 27 9 - 39 U/L     Bilirubin, Total 0.5 0.0 - 1.2 mg/dL     ALT 27 10 - 52 U/L   Troponin I, High Sensitivity, Initial   Result Value Ref Range     Troponin I, High Sensitivity 6 0 - 20 ng/L   Troponin, High Sensitivity, 1 Hour   Result Value Ref Range     Troponin I, High Sensitivity 6 0 - 20 ng/L            Medications:          Current Outpatient Medications   Medication  Instructions    apixaban (ELIQUIS) 2.5 mg, oral, 2 times daily    atorvastatin (LIPITOR) 40 mg, oral, Daily    calcium carbonate-vitamin D3 600 mg-20 mcg (800 unit) tablet 2 tablets, oral, Daily    esomeprazole (NexIUM) 20 mg DR capsule 1 capsule, oral, Daily    hydroCHLOROthiazide (HYDRODIURIL) 12.5 mg, oral, Daily    ketoconazole (NIZOral) 2 % cream Apply 1 Application topically 2 times a day.    ketoconazole (NIZOral) 2 % shampoo Apply 1 Application topically every other day.    latanoprost (Xalatan) 0.005 % ophthalmic solution 1 drop, Both Eyes, Nightly    losartan (COZAAR) 50 mg, oral, Daily    multivitamin tablet 1 tablet, oral, Daily    sertraline (ZOLOFT) 50 mg, oral, Daily    tamsulosin (Flomax) 0.4 mg 24 hr capsule oral            Assessment:       This is a 73-year-old male with history of fall and significant bodily injury as well as atrial fibrillation.     The CHADS-VASC score is 4 and HAS-BLED score is 2. The patient is at increased risk of both bleeding and stroke.  As such the patient is a reasonable candidate for consideration of left atrial appendage occluder placement.     Proceed with LAAC    The risks discussed included but were not limited to vascular complications, sedation related complications, risk of MI, CVA, device embolization, pericardial tamponade and death. The patient verbalized understanding and decided to proceed.      Following device implant, strategy will be for dual antiplatelet therapy with aspirin and clopidogrel for 6 months then aspirin for life.            Quality 110: Preventive Care And Screening: Influenza Immunization: Influenza Immunization not Administered because Patient Refused. Quality 431: Preventive Care And Screening: Unhealthy Alcohol Use - Screening: Patient screened for unhealthy alcohol use using a single question and scores less than 2 times per year Additional Notes: Does not participate in the ACP Quality 47: Advance Care Plan: Advance Care Planning discussed and documented in the medical record; patient did not wish or was not able to name a surrogate decision maker or provide an advance care plan. Quality 226: Preventive Care And Screening: Tobacco Use: Screening And Cessation Intervention: Patient screened for tobacco and is a smoker AND received Cessation Counseling Quality 130: Documentation Of Current Medications In The Medical Record: Current Medications Documented Quality 111:Pneumonia Vaccination Status For Older Adults: Pneumococcal Vaccination not Administered or Previously Received, Reason not Otherwise Specified Detail Level: Detailed

## 2024-01-24 ENCOUNTER — PATIENT OUTREACH (OUTPATIENT)
Dept: CARDIOLOGY | Facility: CLINIC | Age: 74
End: 2024-01-24
Payer: MEDICARE

## 2024-01-24 LAB — ACT BLD: 277 SEC (ref 89–169)

## 2024-01-24 NOTE — PROGRESS NOTES
Discharge Facility:  [facility]   Discharge Diagnosis:     * Atrial fibrillation, unspecified type   20 Watchman device   Admission Date:  1/23/24   Discharge Date:  1/23/24      PCP Appointment Date: not scheduled- task to office   Specialist Appointment Date:   2/22/24  Dr Gary   [facility] Encounter and Summary: Linked   See discharge assessment below for further details    Engagement  Call Start Time: 1200 (1/24/2024 12:22 PM)    Medications  Medications reviewed with patient/caregiver?: Yes (1/24/2024 12:22 PM)  Is the patient having any side effects they believe may be caused by any medication additions or changes?: No (1/24/2024 12:22 PM)  Does the patient have all medications ordered at discharge?: Yes (1/24/2024 12:22 PM)  Prescription Comments: script for asa and plavix (1/24/2024 12:22 PM)  Is the patient taking all medications as directed (includes completed medication regime)?: Yes (1/24/2024 12:22 PM)  Medication Comments: no questions or concerns at this time. (1/24/2024 12:22 PM)    Appointments  Does the patient have a primary care provider?: Yes (1/24/2024 12:22 PM)  Care Management Interventions: Educated patient on importance of making appointment (1/24/2024 12:22 PM)  Has the patient kept scheduled appointments due by today?: Yes (1/24/2024 12:22 PM)    Patient Teaching  Does the patient have access to their discharge instructions?: Yes (1/24/2024 12:22 PM)  Care Management Interventions: Reviewed instructions with patient (1/24/2024 12:22 PM)  What is the patient's perception of their health status since discharge?: Improving (1/24/2024 12:22 PM)  Is the patient/caregiver able to teach back the hierarchy of who to call/visit for symptoms/problems? PCP, Specialist, Home Health nurse, Urgent Care, ED, 911: Yes (1/24/2024 12:22 PM)  Patient/Caregiver Education Comments: This CM spoke with pt via phone. Pt reports doing well at home since discharge. New meds reviewed. Pt denies CP  and SOB. site care discussed.  Pt aware of my availability for non-emergent concerns. Contact info provided to patient. (1/24/2024 12:22 PM)

## 2024-01-30 LAB
ATRIAL RATE: 68 BPM
P AXIS: 59 DEGREES
P OFFSET: 195 MS
P ONSET: 139 MS
PR INTERVAL: 168 MS
Q ONSET: 223 MS
QRS COUNT: 11 BEATS
QRS DURATION: 84 MS
QT INTERVAL: 426 MS
QTC CALCULATION(BAZETT): 452 MS
QTC FREDERICIA: 444 MS
R AXIS: 27 DEGREES
T AXIS: 22 DEGREES
T OFFSET: 436 MS
VENTRICULAR RATE: 68 BPM

## 2024-02-07 ENCOUNTER — PATIENT OUTREACH (OUTPATIENT)
Dept: PRIMARY CARE | Facility: CLINIC | Age: 74
End: 2024-02-07
Payer: MEDICARE

## 2024-02-07 NOTE — PROGRESS NOTES
Unable to reach patient for call back after hosp stay . No pcp appt scheduled at this time.  LVM with call back number for patient to call if needed

## 2024-02-12 ENCOUNTER — HOSPITAL ENCOUNTER (OUTPATIENT)
Dept: CARDIOLOGY | Facility: CLINIC | Age: 74
Discharge: HOME | End: 2024-02-12
Payer: MEDICARE

## 2024-02-12 DIAGNOSIS — Z95.818 IMPLANTABLE LOOP RECORDER PRESENT: ICD-10-CM

## 2024-02-12 DIAGNOSIS — I48.91 ATRIAL FIBRILLATION, UNSPECIFIED TYPE (MULTI): ICD-10-CM

## 2024-02-12 DIAGNOSIS — Z95.818 IMPLANTABLE LOOP RECORDER PRESENT: Primary | ICD-10-CM

## 2024-02-12 PROCEDURE — 93298 REM INTERROG DEV EVAL SCRMS: CPT | Performed by: STUDENT IN AN ORGANIZED HEALTH CARE EDUCATION/TRAINING PROGRAM

## 2024-02-12 PROCEDURE — 93297 REM INTERROG DEV EVAL ICPMS: CPT

## 2024-02-19 ENCOUNTER — TELEPHONE (OUTPATIENT)
Dept: PRIMARY CARE | Facility: CLINIC | Age: 74
End: 2024-02-19
Payer: MEDICARE

## 2024-02-19 DIAGNOSIS — U07.1 COVID: Primary | ICD-10-CM

## 2024-02-19 RX ORDER — NIRMATRELVIR AND RITONAVIR 300-100 MG
3 KIT ORAL 2 TIMES DAILY
Qty: 5 DOSE PACK | Refills: 0 | Status: SHIPPED | OUTPATIENT
Start: 2024-02-19 | End: 2024-02-24

## 2024-02-19 NOTE — TELEPHONE ENCOUNTER
WHEN PATIENT WAS A T  [facility], DID NOT GET PAXLOVID.   NEEDS SCRIPT SENT TO [facility].   SYMPTOMS STARTED  2-16-24.  PLEASE

## 2024-02-19 NOTE — TELEPHONE ENCOUNTER
PATIENT WAS SEEN AT [facility] 2-18-24 FOR CO EVELIN.   GIVEN PAXLOVID.    TOLD TO CHECK WITH US IF HE  NEEDS TO HOLD ANY MEDS ?

## 2024-02-20 ENCOUNTER — HOSPITAL ENCOUNTER (OUTPATIENT)
Dept: CARDIOLOGY | Facility: CLINIC | Age: 74
Discharge: HOME | End: 2024-02-20
Payer: MEDICARE

## 2024-02-20 DIAGNOSIS — I48.91 ATRIAL FIBRILLATION, UNSPECIFIED TYPE (MULTI): ICD-10-CM

## 2024-02-20 DIAGNOSIS — Z95.818 IMPLANTABLE LOOP RECORDER PRESENT: ICD-10-CM

## 2024-02-21 NOTE — PROGRESS NOTES
Cardiology Subsequent Encounter Clinic Note  Name: Bonifacio Hernandez  MRN: [id number]  : 1950    CC: CVA    Virtual visit over the telephone    Active Issues:  Bonifacio Hernandez is a 73 y.o. 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   -Subsequent loop recorder noted atrial fibrillation for which the patient was initiated on Eliquis 2.5 mg twice daily     Problem #2 hypertension     Problem #3 hyperlipidemia  - on atorvastatin 80 mg     At his last visit the patient endorsed an episode of fall/significant dizziness; there was a concern for him being on long-term anticoagulation for which she underwent left atrial appendage occluder with a Watchman device in 2024.  Is now on aspirin/Plavix.  Off Eliquis.      Past Medical History  Past Medical History:   Diagnosis Date    Ischemic stroke (CMS/[facility])     Personal history of other diseases of the circulatory system 2021    History of cardiac arrhythmia       Past Surgical History  Past Surgical History:   Procedure Laterality Date    CARDIAC CATHETERIZATION N/A 2024    Procedure: LAAO (Left Atrial Appendage Occlusion);  Surgeon: Bj Robles MD;  Location: [address] Cardiac Cath Lab;  Service: Cardiovascular;  Laterality: N/A;  Same day CT at 1200    OTHER SURGICAL HISTORY  2019    Varicose vein ligation    OTHER SURGICAL HISTORY  2019    Inguinal hernia repair    OTHER SURGICAL HISTORY  2019    Esophagogastroduodenoscopy    OTHER SURGICAL HISTORY  10/15/2019    Back surgery    OTHER SURGICAL HISTORY  2020    Colonoscopy    OTHER SURGICAL HISTORY  2023    Loop monitor    TOTAL KNEE ARTHROPLASTY Right 2023    TOTAL KNEE ARTHROPLASTY Left        Medications  Current Outpatient Medications on File Prior to Visit    Medication Sig Dispense Refill    aspirin 81 mg chewable tablet Chew 1 tablet (81 mg) once daily. 90 tablet 3    atorvastatin (Lipitor) 40 mg tablet Take 1 tablet (40 mg) by mouth once daily. 90 tablet 3    clopidogrel (Plavix) 75 mg tablet Take 1 tablet (75 mg) by mouth once daily. 90 tablet 1    hydroCHLOROthiazide (HYDRODiuril) 12.5 mg tablet Take 1 tablet (12.5 mg) by mouth once daily. 90 tablet 3    ketoconazole (NIZOral) 2 % cream Apply 1 Application topically 2 times a day.      ketoconazole (NIZOral) 2 % shampoo Apply 1 Application topically every other day.      latanoprost (Xalatan) 0.005 % ophthalmic solution Administer 1 drop into both eyes once daily at bedtime.      losartan (Cozaar) 50 mg tablet Take 1 tablet (50 mg) by mouth once daily. 90 tablet 3    nirmatrelvir-ritonavir (Paxlovid) 300 mg (150 mg x 2)-100 mg tablet therapy pack Take 3 tablets by mouth 2 times a day for 5 days. 5 Dose pack 0    sertraline (Zoloft) 50 mg tablet Take 1 tablet (50 mg) by mouth once daily. 30 tablet 11    tamsulosin (Flomax) 0.4 mg 24 hr capsule Take 1 capsule (0.4 mg) by mouth once daily.       No current facility-administered medications on file prior to visit.       Allergies  Allergies   Allergen Reactions    Bee Pollen Other and Runny nose    Adhesive Tape-Silicones Other and Rash     EKG adhesive    Lisinopril Cough and Other     cough       Social History  Social History     Tobacco Use    Smoking status: Never    Smokeless tobacco: Never   Vaping Use    Vaping Use: Never used   Substance Use Topics    Alcohol use: Never    Drug use: Never       Family History  Family History   Problem Relation Name Age of Onset    Emphysema Father      Hypertension Sister      Pulmonary embolism Sister         Physical Examination  Vitals: There were no vitals taken for this visit.  Physical examination was performed since this is a virtual visit     Labs/Imaging/Procedures    Lab Results   Component Value Date    HGB 14.0  01/09/2024    HGB 14.5 10/08/2023    HGB 15.9 04/10/2023    HGB 14.3 11/01/2022    HGB 15.5 10/31/2022     01/09/2024    WBC 6.7 01/09/2024     01/09/2024    K 3.9 01/09/2024    CREATININE 0.74 01/09/2024    CREATININE 0.76 10/08/2023    CREATININE 0.93 04/10/2023    CREATININE 0.73 11/01/2022    CREATININE 0.76 10/31/2022    BUN 12 01/09/2024    CALCIUM 9.6 01/09/2024    INR 1.1 10/31/2022    TROPHS 6 10/08/2023    TROPHS 6 10/08/2023    TROPHS 7 10/31/2022    LDLF 80 11/01/2022     Transthoracic Echo (TTE) Limited    Result Date: 1/23/2024   [facility], [address] [place] [address]                Tel [phone] and Fax [phone] TRANSTHORACIC ECHOCARDIOGRAM REPORT  Patient Name:      JENNA Paredes Physician:    [id number] Andrea Cabrera MD Study Date:        1/23/2024           Ordering Provider:    [id number] HUGO HUSSEIN MRN/PID:           [id number]            Fellow:               [id number] Carlos Chadwick MD PhD Accession#:        [id number]        Nurse: Date of Birth/Age: 1950 / 73 years Sonographer:          Mavis Waters RDCS Gender:            M                   Additional Staff: Height:            177.80 cm           Admit Date: Weight:            78.47 kg            Admission Status:     Inpatient - Routine BSA:               1.96 m2             Encounter#:           [id number]                                        Department Location:  [facility] Cath                                                              Lab Blood Pressure: 120 /36 mmHg Study Type:    TRANSTHORACIC ECHO (TTE) LIMITED Diagnosis/ICD: Presence of other cardiac implants and grafts-Z95.818 Indication:    s/p LAAO CPT Code:      Echo Limited-63823  Study Detail: The following Echo studies were  performed: 2D.  PHYSICIAN INTERPRETATION: Left Ventricle: The left ventricular systolic function is normal, with an estimated ejection fraction of 60-65%. There are no regional wall motion abnormalities. The left ventricular cavity size is normal. Left ventricular diastolic filling was not assessed. Left Atrium: The left atrium is normal in size. Right Ventricle: The right ventricle was not assessed. Right ventricular systolic function not assessed. Right Atrium: The right atrium was not well visualized. Aortic Valve: The aortic valve is trileaflet. Aortic valve regurgitation was not assessed. Mitral Valve: The mitral valve is normal in structure. There is mild mitral annular calcification. Mitral valve regurgitation was not assessed. Tricuspid Valve: The tricuspid valve is structurally normal. Tricuspid regurgitation was not assessed. Pulmonic Valve: The pulmonic valve is not well visualized. Pulmonic valve regurgitation was not assessed. Pericardium: There is no pericardial effusion noted. Aorta: The aortic root was not assessed. In comparison to the previous echocardiogram(s): Compared with study from 1/23/2024, no significant change.  CONCLUSIONS:  1. Left ventricular systolic function is normal with a 60-65% estimated ejection fraction.  2. Limited TTE post LAAO Watchman placement with no pericardial effusion. QUANTITATIVE DATA SUMMARY:  [id number] Andrea Cabrera MD Electronically signed on 1/23/2024 at 5:44:20 PM  ** Final **     Transthoracic Echo (TTE) Limited    Result Date: 1/23/2024   [facility], [address] [place] [address]                Tel [phone] and Fax [phone] TRANSTHORACIC ECHOCARDIOGRAM REPORT  Patient Name:     JENNA Paredes Physician:  [id number] Radha Plunkett MD Study Date:       1/23/2024          Ordering Provider:  [id number] HUGO HUSSEIN MRN/PID:          [id number]           Fellow: Accession#:        [id number]       Nurse: Date of           1950 / 73      Sonographer:        Mavis Waters RDCS Birth/Age:        years Gender:           M                  Additional Staff: Height:           177.80 cm          Admit Date: Weight:           78.47 kg           Admission Status:   Inpatient - Routine BSA:              1.96 m2            Encounter#:         [id number]                                      Department          [facility] Cath                                      Location:           Lab Blood Pressure: 144 /66 mmHg Study Type:    TRANSTHORACIC ECHO (TTE) LIMITED Diagnosis/ICD: Encounter for preprocedural cardiovascular examination-Z01.810 Indication:    Pre LAAO CPT Code:      Echo Limited-38847  Study Detail: The following Echo studies were performed: 2D. Technically               challenging study due to patient lying in supine position.  PHYSICIAN INTERPRETATION: Left Ventricle: The left ventricular systolic function is normal, with an estimated ejection fraction of 60-65%. There are no regional wall motion abnormalities. The left ventricular cavity size is normal. Left ventricular diastolic filling was not assessed. Left Atrium: The left atrium is normal in size. Right Ventricle: The right ventricle was not assessed. Right ventricular systolic function not assessed. Grossly normal RV systolic function though not quantified. Right Atrium: The right atrium is upper limits of normal in size. Aortic Valve: The aortic valve is trileaflet. Aortic valve regurgitation was not assessed. Mitral Valve: The mitral valve is normal in structure. Mitral valve regurgitation was not assessed. Tricuspid Valve: The tricuspid valve is structurally normal. Tricuspid regurgitation was not assessed. Pulmonic Valve: The pulmonic valve is not well visualized. Pulmonic valve regurgitation was not assessed. Pericardium: There is no pericardial effusion noted. Aorta: The aortic root is abnormal. There is mild  dilatation of the aortic root. In comparison to the previous echocardiogram(s): Compared with the prior exam rom 11/1/2022 today's exam is only a limited exam. There was normal LV and RV systolic function at that time with out significiant valvular pathology.  CONCLUSIONS:  1. Left ventricular systolic function is normal with a 60-65% estimated ejection fraction.  2. Grossly normal RV systolic function though not quantified.  3. Limited TTE pre LAAO Watchman closure device placement.  4. Compared with the prior exam rom 11/1/2022 today's exam is only a limited exam. There was normal LV and RV systolic function at that time with out significiant valvular pathology. QUANTITATIVE DATA SUMMARY: LA VOLUME:                              Normal Ranges: LA Vol A4C:        63.5 ml   (22+/-6mL/m2) LA Vol Index A4C:  32.4ml/m2 LA Area A4C:       17.5 cm2 LA Major Axis A4C: 4.1 cm AORTA MEASUREMENTS:                      Normal Ranges: Ao Sinus, d: 3.80 cm (2.1-3.5cm)  [id number] Radha Plunkett MD Electronically signed on 1/23/2024 at 1:18:41 PM  ** Final **    ECG 12 lead daily  Normal sinus rhythm  Nonspecific ST abnormality  Abnormal ECG  When compared with ECG of 08-OCT-2023 17:25,  Previous ECG has undetermined rhythm, needs review  Confirmed by Bala Smalls ([id number]) on 1/30/2024 11:04:27 AM      Impression  Bonifacio Hernandez is a 73 y.o. male with a medical history of hypertension, hyperlipidemia, abnormal event monitor here to establish care regarding the following conditions:     Problem #1 recent CVA  -Posterior MCA CVA without lasting neurological deficits October 2022.   -Status post watchman 1/23/2024.  Currently on aspirin/Plavix.  Off Eliquis     Problem #2 hypertension     Problem #3 hyperlipidemia  - on atorvastatin 80 mg     Plan:  -He is now status post watchman.  Continue aspirin/Plavix for 6 months; following which he will switch to aspirin only  -Atrial fibrillation appears to be paroxysmal/nonvalvular  -RTC 6  months    Papito Gary MD  Advanced Heart Failure/Transplant Cardiology  Cardio-Oncology  [facility] [place]

## 2024-02-22 ENCOUNTER — TELEMEDICINE (OUTPATIENT)
Dept: CARDIOLOGY | Facility: CLINIC | Age: 74
End: 2024-02-22
Payer: MEDICARE

## 2024-02-22 DIAGNOSIS — I48.0 PAROXYSMAL ATRIAL FIBRILLATION (MULTI): Primary | ICD-10-CM

## 2024-02-22 PROCEDURE — 1111F DSCHRG MED/CURRENT MED MERGE: CPT | Performed by: STUDENT IN AN ORGANIZED HEALTH CARE EDUCATION/TRAINING PROGRAM

## 2024-02-22 PROCEDURE — 99443 PR PHYS/QHP TELEPHONE EVALUATION 21-30 MIN: CPT | Performed by: STUDENT IN AN ORGANIZED HEALTH CARE EDUCATION/TRAINING PROGRAM

## 2024-02-22 PROCEDURE — 1125F AMNT PAIN NOTED PAIN PRSNT: CPT | Performed by: STUDENT IN AN ORGANIZED HEALTH CARE EDUCATION/TRAINING PROGRAM

## 2024-02-22 PROCEDURE — 1036F TOBACCO NON-USER: CPT | Performed by: STUDENT IN AN ORGANIZED HEALTH CARE EDUCATION/TRAINING PROGRAM

## 2024-02-22 PROCEDURE — 1160F RVW MEDS BY RX/DR IN RCRD: CPT | Performed by: STUDENT IN AN ORGANIZED HEALTH CARE EDUCATION/TRAINING PROGRAM

## 2024-02-22 PROCEDURE — 1157F ADVNC CARE PLAN IN RCRD: CPT | Performed by: STUDENT IN AN ORGANIZED HEALTH CARE EDUCATION/TRAINING PROGRAM

## 2024-02-22 PROCEDURE — 1159F MED LIST DOCD IN RCRD: CPT | Performed by: STUDENT IN AN ORGANIZED HEALTH CARE EDUCATION/TRAINING PROGRAM

## 2024-03-07 ENCOUNTER — PATIENT OUTREACH (OUTPATIENT)
Dept: PRIMARY CARE | Facility: CLINIC | Age: 74
End: 2024-03-07
Payer: MEDICARE

## 2024-04-04 ENCOUNTER — PATIENT OUTREACH (OUTPATIENT)
Dept: PRIMARY CARE | Facility: CLINIC | Age: 74
End: 2024-04-04
Payer: MEDICARE

## 2024-04-04 NOTE — PROGRESS NOTES
Final call back to assess needs post discharge. Left voicemail for patient. Contact information provided.

## 2024-04-05 DIAGNOSIS — F41.9 ANXIETY: ICD-10-CM

## 2024-04-08 DIAGNOSIS — F41.9 ANXIETY: ICD-10-CM

## 2024-04-08 RX ORDER — SERTRALINE HYDROCHLORIDE 50 MG/1
50 TABLET, FILM COATED ORAL DAILY
Qty: 30 TABLET | Refills: 11 | Status: SHIPPED | OUTPATIENT
Start: 2024-04-08 | End: 2025-04-08

## 2024-05-02 DIAGNOSIS — N40.1 BENIGN PROSTATIC HYPERPLASIA WITH URINARY FREQUENCY: Primary | ICD-10-CM

## 2024-05-02 DIAGNOSIS — R35.0 BENIGN PROSTATIC HYPERPLASIA WITH URINARY FREQUENCY: Primary | ICD-10-CM

## 2024-05-03 RX ORDER — TAMSULOSIN HYDROCHLORIDE 0.4 MG/1
0.4 CAPSULE ORAL NIGHTLY
Qty: 90 CAPSULE | Refills: 3 | Status: SHIPPED | OUTPATIENT
Start: 2024-05-03

## 2024-05-20 ENCOUNTER — LAB (OUTPATIENT)
Dept: LAB | Facility: LAB | Age: 74
End: 2024-05-20
Payer: MEDICARE

## 2024-05-20 DIAGNOSIS — I48.91 ATRIAL FIBRILLATION, UNSPECIFIED TYPE (MULTI): ICD-10-CM

## 2024-05-20 LAB
ALBUMIN SERPL BCP-MCNC: 4.1 G/DL (ref 3.4–5)
ANION GAP SERPL CALC-SCNC: 12 MMOL/L (ref 10–20)
BUN SERPL-MCNC: 15 MG/DL (ref 6–23)
CALCIUM SERPL-MCNC: 9.1 MG/DL (ref 8.6–10.3)
CHLORIDE SERPL-SCNC: 99 MMOL/L (ref 98–107)
CO2 SERPL-SCNC: 28 MMOL/L (ref 21–32)
CREAT SERPL-MCNC: 0.7 MG/DL (ref 0.5–1.3)
EGFRCR SERPLBLD CKD-EPI 2021: >90 ML/MIN/1.73M*2
GLUCOSE SERPL-MCNC: 106 MG/DL (ref 74–99)
PHOSPHATE SERPL-MCNC: 3.4 MG/DL (ref 2.5–4.9)
POTASSIUM SERPL-SCNC: 3.8 MMOL/L (ref 3.5–5.3)
SODIUM SERPL-SCNC: 135 MMOL/L (ref 136–145)

## 2024-05-20 PROCEDURE — 80069 RENAL FUNCTION PANEL: CPT

## 2024-05-20 PROCEDURE — 36415 COLL VENOUS BLD VENIPUNCTURE: CPT

## 2024-05-21 DIAGNOSIS — E78.2 MIXED HYPERLIPIDEMIA: ICD-10-CM

## 2024-05-21 RX ORDER — ATORVASTATIN CALCIUM 40 MG/1
40 TABLET, FILM COATED ORAL DAILY
Qty: 90 TABLET | Refills: 3 | Status: SHIPPED | OUTPATIENT
Start: 2024-05-21 | End: 2025-05-21

## 2024-05-24 ENCOUNTER — HOSPITAL ENCOUNTER (OUTPATIENT)
Dept: RADIOLOGY | Facility: CLINIC | Age: 74
Discharge: HOME | End: 2024-05-24
Payer: MEDICARE

## 2024-05-24 DIAGNOSIS — I48.91 ATRIAL FIBRILLATION, UNSPECIFIED TYPE (MULTI): ICD-10-CM

## 2024-05-24 PROCEDURE — 2550000001 HC RX 255 CONTRASTS: Performed by: INTERNAL MEDICINE

## 2024-05-24 PROCEDURE — 75572 CT HRT W/3D IMAGE: CPT

## 2024-05-24 RX ADMIN — IOHEXOL 70 ML: 350 INJECTION, SOLUTION INTRAVENOUS at 11:01

## 2024-06-21 NOTE — DOCUMENTATION CLARIFICATION NOTE
PATIENT:               JENNA PHILLIPS  ACCT #:                  [id number]  MRN:                       [id number]  :                       1950  ADMIT DATE:       2024 10:40 AM  DISCH DATE:        2024 4:44 PM  RESPONDING PROVIDER #:        [id number]          PROVIDER RESPONSE TEXT:    Other shared Decision Making Addendum added to previous visit    CDI QUERY TEXT:    Clarification        Instruction:  Based on your assessment of the patient and the clinical information, please provide the requested documentation by clicking on the appropriate radio button and enter any additional information if prompted.    Question: Can you specify the Shared Decision Making Tool used during discussion with the patient for Left Atrial Appendage Closure Device use    When answering this query, please exercise your independent professional judgment. The fact that a question is being asked, does not imply that any particular answer is desired or expected.    The patient's clinical indicators include:  Clinical Information: This 73-year-old male with hypertension hyperlipidemia prior stroke posterior infarct in 2022 and atrial fibrillation.  In 2023 the patient had significant fall event with facial trauma and laceration of the left thumb.  He still has episodes of dizziness but has not had any recurrent falls. Given the patient's significant fall, the patient underwent left atrial appendage closure on 2024 for stroke risk reduction.    Treatment: 2024 Watchman procedure  Options provided:  -- [employer] Shared Decision Making Tool  -- CardioSmart Shared American College of [employer] Decision Making Tool  -- NICE [employer] [place] [employer]  -- Other shared Decision Making, Please specify additional information below  -- Other - I will add my own diagnosis  -- Refer to Clinical Documentation Reviewer    Query created by: Maribel Emmanuel on  6/17/2024 11:45 AM      Electronically signed by:  JORJE NELSON MD 6/21/2024 4:05 PM

## 2024-07-02 ENCOUNTER — TELEPHONE (OUTPATIENT)
Dept: GASTROENTEROLOGY | Facility: CLINIC | Age: 74
End: 2024-07-02
Payer: MEDICARE

## 2024-07-09 DIAGNOSIS — I10 BENIGN ESSENTIAL HYPERTENSION: ICD-10-CM

## 2024-07-09 RX ORDER — LOSARTAN POTASSIUM 50 MG/1
50 TABLET ORAL DAILY
Qty: 90 TABLET | Refills: 3 | Status: SHIPPED | OUTPATIENT
Start: 2024-07-09 | End: 2025-07-09

## 2024-07-23 ENCOUNTER — APPOINTMENT (OUTPATIENT)
Dept: PRIMARY CARE | Facility: CLINIC | Age: 74
End: 2024-07-23
Payer: MEDICARE

## 2024-07-23 VITALS
HEART RATE: 79 BPM | BODY MASS INDEX: 26.07 KG/M2 | HEIGHT: 70 IN | OXYGEN SATURATION: 97 % | SYSTOLIC BLOOD PRESSURE: 124 MMHG | DIASTOLIC BLOOD PRESSURE: 60 MMHG | WEIGHT: 182.1 LBS

## 2024-07-23 DIAGNOSIS — K21.9 GASTROESOPHAGEAL REFLUX DISEASE WITHOUT ESOPHAGITIS: ICD-10-CM

## 2024-07-23 DIAGNOSIS — I10 BENIGN ESSENTIAL HYPERTENSION: ICD-10-CM

## 2024-07-23 DIAGNOSIS — Z00.00 ROUTINE GENERAL MEDICAL EXAMINATION AT HEALTH CARE FACILITY: ICD-10-CM

## 2024-07-23 DIAGNOSIS — E78.2 MIXED HYPERLIPIDEMIA: ICD-10-CM

## 2024-07-23 DIAGNOSIS — I48.0 PAROXYSMAL ATRIAL FIBRILLATION (MULTI): ICD-10-CM

## 2024-07-23 DIAGNOSIS — I63.339 CEREBROVASCULAR ACCIDENT (CVA) DUE TO THROMBOSIS OF POSTERIOR CEREBRAL ARTERY, UNSPECIFIED BLOOD VESSEL LATERALITY (MULTI): ICD-10-CM

## 2024-07-23 DIAGNOSIS — F41.9 ANXIETY: ICD-10-CM

## 2024-07-23 PROCEDURE — 1157F ADVNC CARE PLAN IN RCRD: CPT | Performed by: FAMILY MEDICINE

## 2024-07-23 PROCEDURE — 1160F RVW MEDS BY RX/DR IN RCRD: CPT | Performed by: FAMILY MEDICINE

## 2024-07-23 PROCEDURE — 1159F MED LIST DOCD IN RCRD: CPT | Performed by: FAMILY MEDICINE

## 2024-07-23 PROCEDURE — 3078F DIAST BP <80 MM HG: CPT | Performed by: FAMILY MEDICINE

## 2024-07-23 PROCEDURE — 3074F SYST BP LT 130 MM HG: CPT | Performed by: FAMILY MEDICINE

## 2024-07-23 PROCEDURE — 1036F TOBACCO NON-USER: CPT | Performed by: FAMILY MEDICINE

## 2024-07-23 PROCEDURE — 99214 OFFICE O/P EST MOD 30 MIN: CPT | Performed by: FAMILY MEDICINE

## 2024-07-23 NOTE — PROGRESS NOTES
"Subjective   Patient ID: Bonifacio Hernandez is a 74 y.o. male who presents for Follow-up (6 mo).    HPI   Since the last office visit there have been no interval operations, hospitalizations, important illnesses or injuries.  COVID in March  Watchman in jan- off AC, and now off plavix, on asa 81  CVA- on asa and statin. Left  eyelid and left hand were affected and now resolved  Mood -on sertraline for anxiety for cva. No longer worried. Consider half tab for a sis and stop if wife agrees.  HTN-Takes and tolerates meds without side effects. No alcohol. no tobacco. no exercise. low salt.  Reviewed recommendation for 150 minutes of exercise per week including 2 days of weight training if over age 50    Occ has sweats at night, reduced blanket  Seb derm on nizoral  BPH- nocturia- only occ.  Review of Systems  General-no fatigue weight to within 10 pounds  ENT no problems with vision swallowing  Cardiac no chest pains palpitations change in exercise tolerance or capacity  Pulmonary no cough shortness of breath  GI no heartburn or abdominal pain  Musculoskeletal no joint pains    Objective   /60   Pulse 79   Ht 1.778 m (5' 10\")   Wt 82.6 kg (182 lb 1.6 oz)   SpO2 97%   BMI 26.13 kg/m²     Physical Exam  General:  Alert, No acute distress. Appears stated age  Eye:  Pupils are equal, round and reactive to light, Extraocular movements are intact, Normal conjunctiva.    Neck:  Supple, Non-tender, No carotid bruit, No jugular venous distention, No lymphadenopathy, No thyromegaly.    Respiratory:  Lungs are clear to auscultation, Respirations are non-labored, Breath sounds are equal.    Cardiovascular:  Normal rate, Regular rhythm, No murmur.    Gastrointestinal:  Soft, Non-tender, No organomegaly. No solid or pulsatile mass  Integumentary:  Warm, Dry. No concerning lesions on exposed areas  Neurologic:  Alert, Oriented.  Gross and fine motor intact, CN 2-12 intact  Psychiatric:  Cooperative, Appropriate mood & " affect.    Assessment/Plan   Problem List Items Addressed This Visit             ICD-10-CM    Acid reflux disease K21.9    Relevant Orders    CBC    Comprehensive Metabolic Panel    Afib (Multi) I48.91    Relevant Orders    CBC    Comprehensive Metabolic Panel    Benign essential hypertension I10    Relevant Orders    CBC    Comprehensive Metabolic Panel    CVA (cerebral vascular accident) (Multi) I63.9    Relevant Orders    CBC    Comprehensive Metabolic Panel    Lipid Panel    Hyperlipidemia E78.5    Relevant Orders    CBC    Comprehensive Metabolic Panel    Lipid Panel    Follow Up In Primary Care     Other Visit Diagnoses         Codes    Anxiety     F41.9    Relevant Orders    CBC    Comprehensive Metabolic Panel    Routine general medical examination at health care facility     Z00.00

## 2024-07-26 ENCOUNTER — TELEPHONE (OUTPATIENT)
Dept: PRIMARY CARE | Facility: CLINIC | Age: 74
End: 2024-07-26
Payer: MEDICARE

## 2024-07-30 ENCOUNTER — TELEPHONE (OUTPATIENT)
Dept: CARDIOLOGY | Facility: HOSPITAL | Age: 74
End: 2024-07-30
Payer: MEDICARE

## 2024-08-19 DIAGNOSIS — Z86.010 PERSONAL HISTORY OF COLONIC POLYPS: ICD-10-CM

## 2024-09-25 ENCOUNTER — TELEPHONE (OUTPATIENT)
Age: 74
End: 2024-09-25
Payer: MEDICARE

## 2024-09-25 DIAGNOSIS — I10 BENIGN ESSENTIAL HYPERTENSION: ICD-10-CM

## 2024-09-25 RX ORDER — HYDROCHLOROTHIAZIDE 12.5 MG/1
12.5 TABLET ORAL DAILY
Qty: 90 TABLET | Refills: 3 | Status: SHIPPED | OUTPATIENT
Start: 2024-09-25 | End: 2025-09-25

## 2024-09-25 NOTE — TELEPHONE ENCOUNTER
Pt called asking to get med refill sent to [facility]. Pt was seen 7/23/2024. Pt  has appt scheduled for: 1/23/2025

## 2024-10-07 ENCOUNTER — HOSPITAL ENCOUNTER (OUTPATIENT)
Facility: HOSPITAL | Age: 74
Setting detail: OBSERVATION
Discharge: HOME | End: 2024-10-09
Attending: EMERGENCY MEDICINE | Admitting: STUDENT IN AN ORGANIZED HEALTH CARE EDUCATION/TRAINING PROGRAM
Payer: MEDICARE

## 2024-10-07 ENCOUNTER — APPOINTMENT (OUTPATIENT)
Dept: CARDIOLOGY | Facility: HOSPITAL | Age: 74
End: 2024-10-07
Payer: MEDICARE

## 2024-10-07 ENCOUNTER — APPOINTMENT (OUTPATIENT)
Dept: RADIOLOGY | Facility: HOSPITAL | Age: 74
End: 2024-10-07
Payer: MEDICARE

## 2024-10-07 ENCOUNTER — OFFICE VISIT (OUTPATIENT)
Age: 74
End: 2024-10-07
Payer: MEDICARE

## 2024-10-07 VITALS — OXYGEN SATURATION: 96 % | DIASTOLIC BLOOD PRESSURE: 60 MMHG | HEART RATE: 91 BPM | SYSTOLIC BLOOD PRESSURE: 128 MMHG

## 2024-10-07 DIAGNOSIS — R42 DIZZINESS: Primary | ICD-10-CM

## 2024-10-07 LAB
ALBUMIN SERPL BCP-MCNC: 3.9 G/DL (ref 3.4–5)
ALP SERPL-CCNC: 48 U/L (ref 33–136)
ALT SERPL W P-5'-P-CCNC: 22 U/L (ref 10–52)
ANION GAP SERPL CALC-SCNC: 8 MMOL/L (ref 10–20)
APPEARANCE UR: CLEAR
APTT PPP: 42 SECONDS (ref 27–38)
AST SERPL W P-5'-P-CCNC: 20 U/L (ref 9–39)
ATRIAL RATE: 85 BPM
BASOPHILS # BLD AUTO: 0.08 X10*3/UL (ref 0–0.1)
BASOPHILS NFR BLD AUTO: 0.8 %
BILIRUB SERPL-MCNC: 1 MG/DL (ref 0–1.2)
BILIRUB UR STRIP.AUTO-MCNC: NEGATIVE MG/DL
BUN SERPL-MCNC: 16 MG/DL (ref 6–23)
CALCIUM SERPL-MCNC: 9.2 MG/DL (ref 8.6–10.3)
CARDIAC TROPONIN I PNL SERPL HS: 8 NG/L (ref 0–20)
CHLORIDE SERPL-SCNC: 98 MMOL/L (ref 98–107)
CO2 SERPL-SCNC: 30 MMOL/L (ref 21–32)
COLOR UR: NORMAL
CREAT SERPL-MCNC: 0.85 MG/DL (ref 0.5–1.3)
EGFRCR SERPLBLD CKD-EPI 2021: >90 ML/MIN/1.73M*2
EOSINOPHIL # BLD AUTO: 0.21 X10*3/UL (ref 0–0.4)
EOSINOPHIL NFR BLD AUTO: 2.1 %
ERYTHROCYTE [DISTWIDTH] IN BLOOD BY AUTOMATED COUNT: 12.4 % (ref 11.5–14.5)
GLUCOSE SERPL-MCNC: 124 MG/DL (ref 74–99)
GLUCOSE UR STRIP.AUTO-MCNC: NORMAL MG/DL
HCT VFR BLD AUTO: 44.8 % (ref 41–52)
HGB BLD-MCNC: 15.4 G/DL (ref 13.5–17.5)
IMM GRANULOCYTES # BLD AUTO: 0.04 X10*3/UL (ref 0–0.5)
IMM GRANULOCYTES NFR BLD AUTO: 0.4 % (ref 0–0.9)
INR PPP: 1.1 (ref 0.9–1.1)
KETONES UR STRIP.AUTO-MCNC: NEGATIVE MG/DL
LEUKOCYTE ESTERASE UR QL STRIP.AUTO: NEGATIVE
LYMPHOCYTES # BLD AUTO: 1.32 X10*3/UL (ref 0.8–3)
LYMPHOCYTES NFR BLD AUTO: 13.1 %
MCH RBC QN AUTO: 31.4 PG (ref 26–34)
MCHC RBC AUTO-ENTMCNC: 34.4 G/DL (ref 32–36)
MCV RBC AUTO: 91 FL (ref 80–100)
MONOCYTES # BLD AUTO: 1.2 X10*3/UL (ref 0.05–0.8)
MONOCYTES NFR BLD AUTO: 11.9 %
NEUTROPHILS # BLD AUTO: 7.21 X10*3/UL (ref 1.6–5.5)
NEUTROPHILS NFR BLD AUTO: 71.7 %
NITRITE UR QL STRIP.AUTO: NEGATIVE
NRBC BLD-RTO: 0 /100 WBCS (ref 0–0)
P AXIS: 51 DEGREES
P OFFSET: 204 MS
P ONSET: 149 MS
PH UR STRIP.AUTO: 7 [PH]
PLATELET # BLD AUTO: 195 X10*3/UL (ref 150–450)
POTASSIUM SERPL-SCNC: 4.1 MMOL/L (ref 3.5–5.3)
PR INTERVAL: 154 MS
PROT SERPL-MCNC: 6.4 G/DL (ref 6.4–8.2)
PROT UR STRIP.AUTO-MCNC: NEGATIVE MG/DL
PROTHROMBIN TIME: 13 SECONDS (ref 9.8–12.8)
Q ONSET: 226 MS
QRS COUNT: 14 BEATS
QRS DURATION: 82 MS
QT INTERVAL: 358 MS
QTC CALCULATION(BAZETT): 426 MS
QTC FREDERICIA: 402 MS
R AXIS: -6 DEGREES
RBC # BLD AUTO: 4.91 X10*6/UL (ref 4.5–5.9)
RBC # UR STRIP.AUTO: NEGATIVE /UL
SODIUM SERPL-SCNC: 132 MMOL/L (ref 136–145)
SP GR UR STRIP.AUTO: 1.01
T AXIS: 33 DEGREES
T OFFSET: 405 MS
UROBILINOGEN UR STRIP.AUTO-MCNC: NORMAL MG/DL
VENTRICULAR RATE: 85 BPM
WBC # BLD AUTO: 10.1 X10*3/UL (ref 4.4–11.3)

## 2024-10-07 PROCEDURE — 2500000004 HC RX 250 GENERAL PHARMACY W/ HCPCS (ALT 636 FOR OP/ED): Performed by: NURSE PRACTITIONER

## 2024-10-07 PROCEDURE — 85730 THROMBOPLASTIN TIME PARTIAL: CPT | Performed by: NURSE PRACTITIONER

## 2024-10-07 PROCEDURE — 1160F RVW MEDS BY RX/DR IN RCRD: CPT | Performed by: FAMILY MEDICINE

## 2024-10-07 PROCEDURE — 81003 URINALYSIS AUTO W/O SCOPE: CPT | Performed by: NURSE PRACTITIONER

## 2024-10-07 PROCEDURE — 99222 1ST HOSP IP/OBS MODERATE 55: CPT | Performed by: NURSE PRACTITIONER

## 2024-10-07 PROCEDURE — 85610 PROTHROMBIN TIME: CPT | Performed by: NURSE PRACTITIONER

## 2024-10-07 PROCEDURE — 80053 COMPREHEN METABOLIC PANEL: CPT | Performed by: NURSE PRACTITIONER

## 2024-10-07 PROCEDURE — 70544 MR ANGIOGRAPHY HEAD W/O DYE: CPT | Mod: 59

## 2024-10-07 PROCEDURE — 2500000001 HC RX 250 WO HCPCS SELF ADMINISTERED DRUGS (ALT 637 FOR MEDICARE OP): Performed by: NURSE PRACTITIONER

## 2024-10-07 PROCEDURE — G0378 HOSPITAL OBSERVATION PER HR: HCPCS

## 2024-10-07 PROCEDURE — 96360 HYDRATION IV INFUSION INIT: CPT

## 2024-10-07 PROCEDURE — 85025 COMPLETE CBC W/AUTO DIFF WBC: CPT | Performed by: NURSE PRACTITIONER

## 2024-10-07 PROCEDURE — 99285 EMERGENCY DEPT VISIT HI MDM: CPT | Mod: 25

## 2024-10-07 PROCEDURE — 1036F TOBACCO NON-USER: CPT | Performed by: FAMILY MEDICINE

## 2024-10-07 PROCEDURE — 70450 CT HEAD/BRAIN W/O DYE: CPT | Performed by: RADIOLOGY

## 2024-10-07 PROCEDURE — 3078F DIAST BP <80 MM HG: CPT | Performed by: FAMILY MEDICINE

## 2024-10-07 PROCEDURE — 94762 N-INVAS EAR/PLS OXIMTRY CONT: CPT

## 2024-10-07 PROCEDURE — 71045 X-RAY EXAM CHEST 1 VIEW: CPT

## 2024-10-07 PROCEDURE — 71045 X-RAY EXAM CHEST 1 VIEW: CPT | Mod: FOREIGN READ | Performed by: RADIOLOGY

## 2024-10-07 PROCEDURE — 36415 COLL VENOUS BLD VENIPUNCTURE: CPT | Performed by: NURSE PRACTITIONER

## 2024-10-07 PROCEDURE — 70450 CT HEAD/BRAIN W/O DYE: CPT

## 2024-10-07 PROCEDURE — 99214 OFFICE O/P EST MOD 30 MIN: CPT | Performed by: FAMILY MEDICINE

## 2024-10-07 PROCEDURE — 1157F ADVNC CARE PLAN IN RCRD: CPT | Performed by: FAMILY MEDICINE

## 2024-10-07 PROCEDURE — 70551 MRI BRAIN STEM W/O DYE: CPT

## 2024-10-07 PROCEDURE — 3074F SYST BP LT 130 MM HG: CPT | Performed by: FAMILY MEDICINE

## 2024-10-07 PROCEDURE — 93005 ELECTROCARDIOGRAM TRACING: CPT

## 2024-10-07 PROCEDURE — 84484 ASSAY OF TROPONIN QUANT: CPT | Performed by: NURSE PRACTITIONER

## 2024-10-07 PROCEDURE — 1159F MED LIST DOCD IN RCRD: CPT | Performed by: FAMILY MEDICINE

## 2024-10-07 PROCEDURE — 70547 MR ANGIOGRAPHY NECK W/O DYE: CPT

## 2024-10-07 RX ORDER — PANTOPRAZOLE SODIUM 40 MG/1
40 TABLET, DELAYED RELEASE ORAL
Status: DISCONTINUED | OUTPATIENT
Start: 2024-10-08 | End: 2024-10-09 | Stop reason: HOSPADM

## 2024-10-07 RX ORDER — TALC
3 POWDER (GRAM) TOPICAL NIGHTLY PRN
Status: DISCONTINUED | OUTPATIENT
Start: 2024-10-07 | End: 2024-10-09 | Stop reason: HOSPADM

## 2024-10-07 RX ORDER — ONDANSETRON HYDROCHLORIDE 2 MG/ML
4 INJECTION, SOLUTION INTRAVENOUS EVERY 8 HOURS PRN
Status: DISCONTINUED | OUTPATIENT
Start: 2024-10-07 | End: 2024-10-09 | Stop reason: HOSPADM

## 2024-10-07 RX ORDER — TAMSULOSIN HYDROCHLORIDE 0.4 MG/1
0.4 CAPSULE ORAL DAILY
Status: DISCONTINUED | OUTPATIENT
Start: 2024-10-08 | End: 2024-10-09 | Stop reason: HOSPADM

## 2024-10-07 RX ORDER — ATORVASTATIN CALCIUM 40 MG/1
40 TABLET, FILM COATED ORAL DAILY
Status: DISCONTINUED | OUTPATIENT
Start: 2024-10-08 | End: 2024-10-09 | Stop reason: HOSPADM

## 2024-10-07 RX ORDER — FLUTICASONE PROPIONATE 50 MCG
1 SPRAY, SUSPENSION (ML) NASAL DAILY PRN
Status: DISCONTINUED | OUTPATIENT
Start: 2024-10-07 | End: 2024-10-09 | Stop reason: HOSPADM

## 2024-10-07 RX ORDER — ACETAMINOPHEN 160 MG/5ML
650 SOLUTION ORAL EVERY 4 HOURS PRN
Status: DISCONTINUED | OUTPATIENT
Start: 2024-10-07 | End: 2024-10-09 | Stop reason: HOSPADM

## 2024-10-07 RX ORDER — MECLIZINE HCL 12.5 MG 12.5 MG/1
25 TABLET ORAL 3 TIMES DAILY PRN
Status: DISCONTINUED | OUTPATIENT
Start: 2024-10-07 | End: 2024-10-09 | Stop reason: HOSPADM

## 2024-10-07 RX ORDER — HYDROGEN PEROXIDE 3 %
20 SOLUTION, NON-ORAL MISCELLANEOUS DAILY
COMMUNITY

## 2024-10-07 RX ORDER — NAPROXEN SODIUM 220 MG/1
81 TABLET, FILM COATED ORAL DAILY
Status: DISCONTINUED | OUTPATIENT
Start: 2024-10-07 | End: 2024-10-09 | Stop reason: HOSPADM

## 2024-10-07 RX ORDER — SERTRALINE HYDROCHLORIDE 50 MG/1
50 TABLET, FILM COATED ORAL DAILY
Status: DISCONTINUED | OUTPATIENT
Start: 2024-10-08 | End: 2024-10-09 | Stop reason: HOSPADM

## 2024-10-07 RX ORDER — LATANOPROST 50 UG/ML
1 SOLUTION/ DROPS OPHTHALMIC NIGHTLY
Status: DISCONTINUED | OUTPATIENT
Start: 2024-10-07 | End: 2024-10-09 | Stop reason: HOSPADM

## 2024-10-07 RX ORDER — ONDANSETRON 4 MG/1
4 TABLET, ORALLY DISINTEGRATING ORAL EVERY 8 HOURS PRN
Status: DISCONTINUED | OUTPATIENT
Start: 2024-10-07 | End: 2024-10-09 | Stop reason: HOSPADM

## 2024-10-07 RX ORDER — LOSARTAN POTASSIUM 50 MG/1
50 TABLET ORAL DAILY
Status: CANCELLED | OUTPATIENT
Start: 2024-10-07

## 2024-10-07 RX ORDER — POLYETHYLENE GLYCOL 3350 17 G/17G
17 POWDER, FOR SOLUTION ORAL DAILY
Status: DISCONTINUED | OUTPATIENT
Start: 2024-10-07 | End: 2024-10-09 | Stop reason: HOSPADM

## 2024-10-07 RX ORDER — FLUTICASONE PROPIONATE 50 MCG
1 SPRAY, SUSPENSION (ML) NASAL DAILY PRN
COMMUNITY

## 2024-10-07 RX ORDER — ACETAMINOPHEN 650 MG/1
650 SUPPOSITORY RECTAL EVERY 4 HOURS PRN
Status: DISCONTINUED | OUTPATIENT
Start: 2024-10-07 | End: 2024-10-09 | Stop reason: HOSPADM

## 2024-10-07 RX ORDER — ACETAMINOPHEN 325 MG/1
650 TABLET ORAL EVERY 4 HOURS PRN
Status: DISCONTINUED | OUTPATIENT
Start: 2024-10-07 | End: 2024-10-09 | Stop reason: HOSPADM

## 2024-10-07 SDOH — SOCIAL STABILITY: SOCIAL INSECURITY: DOES ANYONE TRY TO KEEP YOU FROM HAVING/CONTACTING OTHER FRIENDS OR DOING THINGS OUTSIDE YOUR HOME?: NO

## 2024-10-07 SDOH — SOCIAL STABILITY: SOCIAL INSECURITY: WERE YOU ABLE TO COMPLETE ALL THE BEHAVIORAL HEALTH SCREENINGS?: YES

## 2024-10-07 SDOH — SOCIAL STABILITY: SOCIAL INSECURITY: ARE THERE ANY APPARENT SIGNS OF INJURIES/BEHAVIORS THAT COULD BE RELATED TO ABUSE/NEGLECT?: NO

## 2024-10-07 SDOH — SOCIAL STABILITY: SOCIAL INSECURITY: ARE YOU OR HAVE YOU BEEN THREATENED OR ABUSED PHYSICALLY, EMOTIONALLY, OR SEXUALLY BY ANYONE?: NO

## 2024-10-07 SDOH — SOCIAL STABILITY: SOCIAL INSECURITY: DO YOU FEEL ANYONE HAS EXPLOITED OR TAKEN ADVANTAGE OF YOU FINANCIALLY OR OF YOUR PERSONAL PROPERTY?: NO

## 2024-10-07 SDOH — SOCIAL STABILITY: SOCIAL INSECURITY: HAS ANYONE EVER THREATENED TO HURT YOUR FAMILY OR YOUR PETS?: NO

## 2024-10-07 SDOH — SOCIAL STABILITY: SOCIAL INSECURITY: DO YOU FEEL UNSAFE GOING BACK TO THE PLACE WHERE YOU ARE LIVING?: NO

## 2024-10-07 SDOH — SOCIAL STABILITY: SOCIAL INSECURITY: HAVE YOU HAD THOUGHTS OF HARMING ANYONE ELSE?: NO

## 2024-10-07 SDOH — SOCIAL STABILITY: SOCIAL INSECURITY: ABUSE: ADULT

## 2024-10-07 ASSESSMENT — COGNITIVE AND FUNCTIONAL STATUS - GENERAL
DAILY ACTIVITIY SCORE: 24
PATIENT BASELINE BEDBOUND: NO
MOBILITY SCORE: 24

## 2024-10-07 ASSESSMENT — LIFESTYLE VARIABLES
HOW MANY STANDARD DRINKS CONTAINING ALCOHOL DO YOU HAVE ON A TYPICAL DAY: PATIENT DOES NOT DRINK
AUDIT-C TOTAL SCORE: 0
SKIP TO QUESTIONS 9-10: 1
PRESCIPTION_ABUSE_PAST_12_MONTHS: NO
AUDIT-C TOTAL SCORE: 0
HOW OFTEN DO YOU HAVE 6 OR MORE DRINKS ON ONE OCCASION: NEVER
SUBSTANCE_ABUSE_PAST_12_MONTHS: NO
HOW OFTEN DO YOU HAVE A DRINK CONTAINING ALCOHOL: NEVER

## 2024-10-07 ASSESSMENT — ACTIVITIES OF DAILY LIVING (ADL)
PATIENT'S MEMORY ADEQUATE TO SAFELY COMPLETE DAILY ACTIVITIES?: YES
TOILETING: INDEPENDENT
BATHING: INDEPENDENT
HEARING - LEFT EAR: HEARING AID
GROOMING: INDEPENDENT
JUDGMENT_ADEQUATE_SAFELY_COMPLETE_DAILY_ACTIVITIES: YES
DRESSING YOURSELF: INDEPENDENT
HEARING - RIGHT EAR: HEARING AID
ADEQUATE_TO_COMPLETE_ADL: YES
LACK_OF_TRANSPORTATION: NO
WALKS IN HOME: INDEPENDENT
FEEDING YOURSELF: INDEPENDENT

## 2024-10-07 ASSESSMENT — PATIENT HEALTH QUESTIONNAIRE - PHQ9
SUM OF ALL RESPONSES TO PHQ9 QUESTIONS 1 & 2: 0
2. FEELING DOWN, DEPRESSED OR HOPELESS: NOT AT ALL
1. LITTLE INTEREST OR PLEASURE IN DOING THINGS: NOT AT ALL

## 2024-10-07 ASSESSMENT — PAIN - FUNCTIONAL ASSESSMENT
PAIN_FUNCTIONAL_ASSESSMENT: 0-10
PAIN_FUNCTIONAL_ASSESSMENT: 0-10

## 2024-10-07 ASSESSMENT — COLUMBIA-SUICIDE SEVERITY RATING SCALE - C-SSRS
6. HAVE YOU EVER DONE ANYTHING, STARTED TO DO ANYTHING, OR PREPARED TO DO ANYTHING TO END YOUR LIFE?: NO
1. IN THE PAST MONTH, HAVE YOU WISHED YOU WERE DEAD OR WISHED YOU COULD GO TO SLEEP AND NOT WAKE UP?: NO
1. IN THE PAST MONTH, HAVE YOU WISHED YOU WERE DEAD OR WISHED YOU COULD GO TO SLEEP AND NOT WAKE UP?: NO
6. HAVE YOU EVER DONE ANYTHING, STARTED TO DO ANYTHING, OR PREPARED TO DO ANYTHING TO END YOUR LIFE?: NO
2. HAVE YOU ACTUALLY HAD ANY THOUGHTS OF KILLING YOURSELF?: NO

## 2024-10-07 ASSESSMENT — PAIN SCALES - GENERAL
PAINLEVEL_OUTOF10: 0 - NO PAIN

## 2024-10-07 NOTE — ASSESSMENT & PLAN NOTE
-Repeat orthostats.  Positive in ED given 1 L bolus  -As needed meclizine.  Patient with a history of dizziness.  Reports worse since Friday.  Symptoms worse when moving head and changing positions.  -MRI MRA pending patient with history of CVA 2 years ago.  -CT brain negative for acute intracranial hemorrhage or mass effect.  Does show diffuse cerebral atrophy  -Check UA.  Patient did have a fever on Saturday of 101.3        Chronic conditions:  History of atrial fibrillation  -Status post Watchman device    History of hypertension  -Hold HCTZ and losartan for now blood pressures soft.  Could be contributing to dizziness as well.    History of CVA  -Was on aspirin and Plavix.  Now aspirin only  -Continue Lipitor 40 mg daily    Anxiety  -Continue sertraline    History of BPH  -On Flomax

## 2024-10-07 NOTE — PROGRESS NOTES
Subjective   Patient ID: Bonifacio Hernandez is a 74 y.o. male who presents for Dizziness.    HPI present as an acute care visit for dizziness.  Onset fri with dizziness and fever to 101, he was in the garage and did fall to his knees due to leg weakness there was no syncope or near syncope.  No loss of consciousness and no injury.  Cardiac review was otherwise negative for chest pains palpitations.  No new neurologic symptoms post CVA.  Prev cva and watchman subsequently  Review of Systems  As per HPI  Objective   /60   Pulse 91   SpO2 96%     Physical Exam  Gaze is not conjugate with the right eye looking superior-lateral.  Visual fields by confrontation are intact no bruit thyroid palpates normal heart regular no murmur.  Lungs clear.   strength equal gait is normal Hallpike is negative.  Romberg negative  Assessment/Plan   Problem List Items Addressed This Visit             ICD-10-CM    Dizziness - Primary R42   Given degree of distress and previous stroke we will need to acutely evaluated in ER.  Telephone call to ER voicing my concerns.

## 2024-10-07 NOTE — H&P
History Of Present Illness  Bonifacio Hernandez is a 74 y.o. male past medical history of atrial fibrillation/Watchman device, CVA, upper lipidemia, hypertension, BPH, anxiety presented to the emergency department with chief complaint of dizziness from PCP office.  In the ED labs revealed glucose 124, sodium 132 otherwise unremarkable.  CT brain revealed diffuse cerebral atrophy, but no acute intracranial hemorrhage or mass effect.  Chest x-ray is negative for acute processes.  EKG shows sinus rhythm rate of 85 no acute ischemia or injury pattern.  MRI last MRI pending.  Patient was found to be orthostatic positive.  He was given 1 L bolus.  Patient was admitted for further investigation of his dizziness.    Patient states he has had periods of dizziness on and off for several years.  He states that over the last 3 days his dizziness has been somewhat worse.  He did have a fever on Saturday of 101.3.  His wife gave him some Tylenol.  It did go down to 99.3 and he has remained afebrile since.  He did not have any associated symptoms with his temperature.  He denied URI symptoms, cough, GI issues.  Has no open wounds or infections.  He does not have any dysuria.     Past Medical History  Past Medical History:   Diagnosis Date    Ischemic stroke (Multi)     Personal history of other diseases of the circulatory system 09/02/2021    History of cardiac arrhythmia       Surgical History  Past Surgical History:   Procedure Laterality Date    CARDIAC CATHETERIZATION N/A 1/23/2024    Procedure: LAAO (Left Atrial Appendage Occlusion);  Surgeon: Bj Robles MD;  Location: [address] Cardiac Cath Lab;  Service: Cardiovascular;  Laterality: N/A;  Same day CT at 1200    OTHER SURGICAL HISTORY  09/16/2019    Varicose vein ligation    OTHER SURGICAL HISTORY  09/16/2019    Inguinal hernia repair    OTHER SURGICAL HISTORY  09/16/2019    Esophagogastroduodenoscopy    OTHER SURGICAL HISTORY  10/15/2019    Back surgery    OTHER SURGICAL  "HISTORY  08/28/2020    Colonoscopy    OTHER SURGICAL HISTORY  01/2023    Loop monitor    TOTAL KNEE ARTHROPLASTY Right 11/28/2023    TOTAL KNEE ARTHROPLASTY Left 2014        Social History  He reports that he has never smoked. He has never used smokeless tobacco. He reports that he does not drink alcohol and does not use drugs.    Family History  Family History   Problem Relation Name Age of Onset    Emphysema Father      Hypertension Sister      Pulmonary embolism Sister          Allergies  Bee pollen, Adhesive tape-silicones, and Lisinopril    Review of Systems  10 systems reviewed and are negative except for above-mentioned.  Physical Exam     GENERAL: Vitals noted, no distress. Alert and oriented x 3. Non-toxic.       EENT: TMs clear. Posterior oropharynx unremarkable. EOMI, no nystagmus noted. Right eye lazy eye noted.      NECK: Supple. No masses. No midline tenderness. No meningeal signs.      CARDIAC: Regular rate, rhythm. No murmurs rubs or gallops. No JVD.     PULMONARY: Lungs clear and equal bilaterally. No wheezes rales or rhonchi. No respiratory distress.      ABDOMEN: Soft, nondistended, and nontender. No peritoneal signs. Bowel sounds are present and normoactive in all 4 quadrants. No pulsatile masses.      EXTREMITIES: No peripheral edema.      SKIN: No rash. Warm, dry, and intact.      NEURO: No focal neurologic deficits. NIH - 0 Gait steady.   Last Recorded Vitals  Blood pressure 121/63, pulse (!) 20, temperature 36.7 °C (98.1 °F), temperature source Temporal, resp. rate 18, height 1.778 m (5' 10\"), weight 80.7 kg (178 lb), SpO2 96%.    Relevant Results    Results for orders placed or performed during the hospital encounter of 10/07/24 (from the past 24 hour(s))   ECG 12 lead   Result Value Ref Range    Ventricular Rate 85 BPM    Atrial Rate 85 BPM    MS Interval 154 ms    QRS Duration 82 ms    QT Interval 358 ms    QTC Calculation(Bazett) 426 ms    P Axis 51 degrees    R Axis -6 degrees    T Axis " 33 degrees    QRS Count 14 beats    Q Onset 226 ms    P Onset 149 ms    P Offset 204 ms    T Offset 405 ms    QTC Fredericia 402 ms   CBC and Auto Differential   Result Value Ref Range    WBC 10.1 4.4 - 11.3 x10*3/uL    nRBC 0.0 0.0 - 0.0 /100 WBCs    RBC 4.91 4.50 - 5.90 x10*6/uL    Hemoglobin 15.4 13.5 - 17.5 g/dL    Hematocrit 44.8 41.0 - 52.0 %    MCV 91 80 - 100 fL    MCH 31.4 26.0 - 34.0 pg    MCHC 34.4 32.0 - 36.0 g/dL    RDW 12.4 11.5 - 14.5 %    Platelets 195 150 - 450 x10*3/uL    Neutrophils % 71.7 40.0 - 80.0 %    Immature Granulocytes %, Automated 0.4 0.0 - 0.9 %    Lymphocytes % 13.1 13.0 - 44.0 %    Monocytes % 11.9 2.0 - 10.0 %    Eosinophils % 2.1 0.0 - 6.0 %    Basophils % 0.8 0.0 - 2.0 %    Neutrophils Absolute 7.21 (H) 1.60 - 5.50 x10*3/uL    Immature Granulocytes Absolute, Automated 0.04 0.00 - 0.50 x10*3/uL    Lymphocytes Absolute 1.32 0.80 - 3.00 x10*3/uL    Monocytes Absolute 1.20 (H) 0.05 - 0.80 x10*3/uL    Eosinophils Absolute 0.21 0.00 - 0.40 x10*3/uL    Basophils Absolute 0.08 0.00 - 0.10 x10*3/uL   Comprehensive metabolic panel   Result Value Ref Range    Glucose 124 (H) 74 - 99 mg/dL    Sodium 132 (L) 136 - 145 mmol/L    Potassium 4.1 3.5 - 5.3 mmol/L    Chloride 98 98 - 107 mmol/L    Bicarbonate 30 21 - 32 mmol/L    Anion Gap 8 (L) 10 - 20 mmol/L    Urea Nitrogen 16 6 - 23 mg/dL    Creatinine 0.85 0.50 - 1.30 mg/dL    eGFR >90 >60 mL/min/1.73m*2    Calcium 9.2 8.6 - 10.3 mg/dL    Albumin 3.9 3.4 - 5.0 g/dL    Alkaline Phosphatase 48 33 - 136 U/L    Total Protein 6.4 6.4 - 8.2 g/dL    AST 20 9 - 39 U/L    Bilirubin, Total 1.0 0.0 - 1.2 mg/dL    ALT 22 10 - 52 U/L   Troponin I, High Sensitivity   Result Value Ref Range    Troponin I, High Sensitivity 8 0 - 20 ng/L   Protime-INR   Result Value Ref Range    Protime 13.0 (H) 9.8 - 12.8 seconds    INR 1.1 0.9 - 1.1   APTT   Result Value Ref Range    aPTT 42 (H) 27 - 38 seconds         Bonifacio GIBBONS Hernandez is a 74 y.o. male past medical history of  atrial fibrillation/Watchman device, CVA, upper lipidemia, hypertension, BPH, anxiety presented to the emergency department with chief complaint of dizziness from PCP office.  In the ED labs revealed glucose 124, sodium 132 otherwise unremarkable.  CT brain revealed diffuse cerebral atrophy, but no acute intracranial hemorrhage or mass effect.  Chest x-ray is negative for acute processes.  EKG shows sinus rhythm rate of 85 no acute ischemia or injury pattern.  MRI last MRI pending.  Patient was found to be orthostatic positive.  He was given 1 L bolus.  Patient was admitted for further investigation of his dizziness.     Assessment/Plan   Assessment & Plan  Dizziness  -Repeat orthostats.  Positive in ED given 1 L bolus  -As needed meclizine.  Patient with a history of dizziness.  Reports worse since Friday.  Symptoms worse when moving head and changing positions.  -MRI MRA pending patient with history of CVA 2 years ago.  -CT brain negative for acute intracranial hemorrhage or mass effect.  Does show diffuse cerebral atrophy  -Check UA.  Patient did have a fever on Saturday of 101.3        Chronic conditions:  History of atrial fibrillation  -Status post Watchman device    History of hypertension  -Hold HCTZ and losartan for now blood pressures soft.  Could be contributing to dizziness as well.    History of CVA  -Was on aspirin and Plavix.  Now aspirin only  -Continue Lipitor 40 mg daily    Anxiety  -Continue sertraline    History of BPH  -On Flomax    DVT prophylaxis Lovenox SCDs    Disposition Home when medically stable     Maryam Blankenship, LOLA-CNP

## 2024-10-07 NOTE — ED PROVIDER NOTES
Chief Complaint   Patient presents with    Dizziness     Patient complains of dizziness for one week, states he feels lightheaded and has been having to hold onto walls and furniture when walking.         Patient History    Past Medical History:   Diagnosis Date    Arthritis     Glaucoma     Ischemic stroke (Multi)     Personal history of other diseases of the circulatory system 09/02/2021    History of cardiac arrhythmia      Past Surgical History:   Procedure Laterality Date    CARDIAC CATHETERIZATION N/A 1/23/2024    Procedure: LAAO (Left Atrial Appendage Occlusion);  Surgeon: Bj Robles MD;  Location: [address] Cardiac Cath Lab;  Service: Cardiovascular;  Laterality: N/A;  Same day CT at 1200    OTHER SURGICAL HISTORY  09/16/2019    Varicose vein ligation    OTHER SURGICAL HISTORY  09/16/2019    Inguinal hernia repair    OTHER SURGICAL HISTORY  09/16/2019    Esophagogastroduodenoscopy    OTHER SURGICAL HISTORY  10/15/2019    Back surgery    OTHER SURGICAL HISTORY  08/28/2020    Colonoscopy    OTHER SURGICAL HISTORY  01/2023    Loop monitor    TOTAL KNEE ARTHROPLASTY Right 11/28/2023    TOTAL KNEE ARTHROPLASTY Left 2014      Family History   Problem Relation Name Age of Onset    Emphysema Father      Hypertension Sister      Pulmonary embolism Sister        Social History     Social History Narrative    Not on file      Allergies   Allergen Reactions    Bee Pollen Other and Runny nose    Adhesive Tape-Silicones Other and Rash     EKG adhesive    Lisinopril Cough and Other     cough        PMH: Reviewed  PSH: Reviewed  Social History: Reviewed.   Allergies reviewed.     HPI: Bonifacio Hernandez is a 74 y.o. male who presents to the ED today accompanied by his wife with complaints of dizziness. States it started 3 days ago. He feels lightheaded, off balance. Denies room spinning sensation. Denies speech disturbance or vision changes. He has history of prior CVA in Nov '22. Reports he had headache, left hand  weakness, and left eyelid drooping with the prior CVA. He has a watchman device. Sent from PCP office this morning.       REVIEW OF SYSTEMS:  All other systems reviewed and negative except as listed in HPI.    PHYSICAL EXAM:    GENERAL: Vitals noted, no distress. Alert and oriented x 3. Non-toxic.      EENT: TMs clear. Posterior oropharynx unremarkable. EOMI, no nystagmus noted. Right eye lazy eye noted.     NECK: Supple. No masses. No midline tenderness. No meningeal signs.     CARDIAC: Regular rate, rhythm. No murmurs rubs or gallops. No JVD.    PULMONARY: Lungs clear and equal bilaterally. No wheezes rales or rhonchi. No respiratory distress.     ABDOMEN: Soft, nondistended, and nontender. No peritoneal signs. Bowel sounds are present and normoactive in all 4 quadrants. No pulsatile masses.     EXTREMITIES: No peripheral edema.     SKIN: No rash. Warm, dry, and intact.     NEURO: No focal neurologic deficits. NIH - 1. Gait steady.     Labs Reviewed   CBC WITH AUTO DIFFERENTIAL - Abnormal       Result Value    WBC 10.1      nRBC 0.0      RBC 4.91      Hemoglobin 15.4      Hematocrit 44.8      MCV 91      MCH 31.4      MCHC 34.4      RDW 12.4      Platelets 195      Neutrophils % 71.7      Immature Granulocytes %, Automated 0.4      Lymphocytes % 13.1      Monocytes % 11.9      Eosinophils % 2.1      Basophils % 0.8      Neutrophils Absolute 7.21 (*)     Immature Granulocytes Absolute, Automated 0.04      Lymphocytes Absolute 1.32      Monocytes Absolute 1.20 (*)     Eosinophils Absolute 0.21      Basophils Absolute 0.08     COMPREHENSIVE METABOLIC PANEL - Abnormal    Glucose 124 (*)     Sodium 132 (*)     Potassium 4.1      Chloride 98      Bicarbonate 30      Anion Gap 8 (*)     Urea Nitrogen 16      Creatinine 0.85      eGFR >90      Calcium 9.2      Albumin 3.9      Alkaline Phosphatase 48      Total Protein 6.4      AST 20      Bilirubin, Total 1.0      ALT 22     PROTIME-INR - Abnormal    Protime 13.0 (*)      INR 1.1     APTT - Abnormal    aPTT 42 (*)     Narrative:     The APTT is no longer used for monitoring Unfractionated Heparin Therapy. For monitoring Heparin Therapy, use the Heparin Assay.   BASIC METABOLIC PANEL - Abnormal    Glucose 104 (*)     Sodium 130 (*)     Potassium 3.6      Chloride 100      Bicarbonate 24      Anion Gap 10      Urea Nitrogen 14      Creatinine 0.83      eGFR >90      Calcium 8.9     BASIC METABOLIC PANEL - Abnormal    Glucose 103 (*)     Sodium 129 (*)     Potassium 3.8      Chloride 99      Bicarbonate 25      Anion Gap 9 (*)     Urea Nitrogen 16      Creatinine 0.70      eGFR >90      Calcium 8.5 (*)    BASIC METABOLIC PANEL - Abnormal    Glucose 93      Sodium 129 (*)     Potassium 4.0      Chloride 99      Bicarbonate 26      Anion Gap 8 (*)     Urea Nitrogen 13      Creatinine 0.68      eGFR >90      Calcium 8.4 (*)    TROPONIN I, HIGH SENSITIVITY - Normal    Troponin I, High Sensitivity 8      Narrative:     Less than 99th percentile of normal range cutoff-  Female and children under 18 years old <14 ng/L; Male <21 ng/L: Negative  Repeat testing should be performed if clinically indicated.     Female and children under 18 years old 14-50 ng/L; Male 21-50 ng/L:  Consistent with possible cardiac damage and possible increased clinical   risk. Serial measurements may help to assess extent of myocardial damage.     >50 ng/L: Consistent with cardiac damage, increased clinical risk and  myocardial infarction. Serial measurements may help assess extent of   myocardial damage.      NOTE: Children less than 1 year old may have higher baseline troponin   levels and results should be interpreted in conjunction with the overall   clinical context.     NOTE: Troponin I testing is performed using a different   testing methodology at [facility] than at other   [facility]. Direct result comparisons should only   be made within the same method.   URINALYSIS WITH REFLEX  MICROSCOPIC - Normal    Color, Urine Light-Yellow      Appearance, Urine Clear      Specific Gravity, Urine 1.011      pH, Urine 7.0      Protein, Urine NEGATIVE      Glucose, Urine Normal      Blood, Urine NEGATIVE      Ketones, Urine NEGATIVE      Bilirubin, Urine NEGATIVE      Urobilinogen, Urine Normal      Nitrite, Urine NEGATIVE      Leukocyte Esterase, Urine NEGATIVE     CBC - Normal    WBC 9.9      nRBC 0.0      RBC 4.75      Hemoglobin 15.0      Hematocrit 43.2      MCV 91      MCH 31.6      MCHC 34.7      RDW 12.1      Platelets 215     POCT GLUCOSE METER        MR brain wo IV contrast   Final Result   MRI BRAIN:   1. No acute ischemic infarct, acute hemorrhage, or intracranial mass   effect.   2. Chronic right MCA territory infarct involving the temporal lobe   and parietal lobe.   3. Extensive bilateral cerebellar hemispheric chronic lacunar   infarcts and mild supratentorial chronic ischemic change.   4. 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        MRA HEAD AND NECK:   1. No evidence of major vessel occlusion or significant stenosis on   MRA head.   2. No evidence of major vessel occlusion or significant stenosis on   MRA neck.        MACRO:   None.        Signed by: Homar Mendez 10/7/2024 9:23 PM   Dictation workstation:   [id number]      MR angio neck wo IV contrast   Final Result   MRI BRAIN:   1. No acute ischemic infarct, acute hemorrhage, or intracranial mass   effect.   2. Chronic right MCA territory infarct involving the temporal lobe   and parietal lobe.   3. Extensive bilateral cerebellar hemispheric chronic lacunar   infarcts and mild supratentorial chronic ischemic change.   4. 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        MRA HEAD AND NECK:   1. No evidence of major vessel occlusion or significant stenosis on   MRA head.   2. No evidence of major vessel occlusion or significant stenosis  on   MRA neck.        MACRO:   None.        Signed by: Homar Mendez 10/7/2024 9:23 PM   Dictation workstation:   [id number]      MR angio head wo IV contrast   Final Result   MRI BRAIN:   1. No acute ischemic infarct, acute hemorrhage, or intracranial mass   effect.   2. Chronic right MCA territory infarct involving the temporal lobe   and parietal lobe.   3. Extensive bilateral cerebellar hemispheric chronic lacunar   infarcts and mild supratentorial chronic ischemic change.   4. 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        MRA HEAD AND NECK:   1. No evidence of major vessel occlusion or significant stenosis on   MRA head.   2. No evidence of major vessel occlusion or significant stenosis on   MRA neck.        MACRO:   None.        Signed by: Homar Mendez 10/7/2024 9:23 PM   Dictation workstation:   [id number]      CT brain attack head wo IV contrast   Final Result   No acute intracranial hemorrhage or mass-effect.        MACRO:   Sheila Boles discussed the significance and urgency of this critical   finding by telephone with  ASPEN CORBIN on 10/7/2024 at 12:48   pm.  (**-RCF-**) Findings:  See findings.             Signed by: Sheila Boles 10/7/2024 12:50 PM   Dictation workstation:   [id number]      XR chest 1 view   Final Result   No acute process.   Signed by Leo Gusman MD           Medical Decision Making  The patient was seen by the advanced practice provider.  I have personally performed a substantive portion of the encounter.  I have seen and examined the patient; agree with the workup, evaluation, MDM, management and diagnosis.  The care plan has been discussed.    I personally saw the patient and made/approved the management plan and take responsibility for the patient's management.   History: This 74-year-old male presented to the ED with complaint of dizziness he states that the symptoms started approximate 3 days ago he admits  to feeling lightheaded, off balance and denies any true room spinning type symptoms.  He denies any change in his speech or vision.  He does admit to having a previous stroke in November 2022 states at that time he had a headache, left hand weakness and left eyelid drooping.  He does admit to having a watchman due to atrial fibrillation.  He states that his primary care doctor sent him to the ED for evaluation.  He denies any focal neurologic symptoms currently.  He does state that his symptoms do change when he moves around.  Exam: Gen: Patient is alert awake oriented appears to be in no acute distress nontoxic pleasant cooperative during evaluation.  ENT examination is unremarkable neck is supple without rigidness.  Heart regular rate and rhythm without murmur.  Lungs are clear to auscultation bilaterally respirations are easy and symmetric without retractions or tachypnea.  Abdomen is soft benign without rebound rigidity or guarding noted.  NIH was performed which is 1.  Patient had no evidence of truncal ataxia with gait and was able to ambulate backwards to the cot when testing his gait.  MDM: Patient was worked up for his symptoms with lab work and a CT scan of the brain.  His urine was negative for infection.  His sodium was slightly low at 132.  His anion gap was 8.  His CBC was unremarkable.  CT scan imaging of the brain revealed no acute intracranial hemorrhage or mass effect.  Patient had chest x-ray obtained this revealed no acute disease process per the radiologist.  Patient was to be admitted to the hospital to get further evaluation for potential cerebellar stroke with an MRI.  Internal medicine requested we order an MRI on the patient which we did.  The patient was then admitted to the hospital for further assessment.  Graham Rivas, DO       Amount and/or Complexity of Data Reviewed  Labs: ordered.  Radiology: ordered.  ECG/medicine tests: ordered.     EKG interpreted by myself shows SR with  rate of 85. Normal axis. NV interval 154. QRS interval 82. QT interval 358. QTc interval 426. Non-specific ST-T wave changes. No acute ischemia or injury pattern.       ED COURSE: This patient was seen and examined by myself and Dr. Rivas. He is placed on a continuous cardiac monitor with pulse oximetry monitoring. Old records and EKGs are obtained and reviewed. IV heplock is established, labs are obtained and noted above.  Orthostatic vital signs are positive.  Was given normal saline 1 L bolus for this.  CT head is negative per radiology.  Chest x-ray negative per radiology.  Given the patient's 3-day history of dizziness/feeling off balance, concern for cerebellar stroke we do feel the patient requires admission.  Patient and family are agreeable.  Discussed with hospitalist who asked for the MRI to be ordered from the ER.  Patient be admitted to the hospitalist service, MRI pending.        Differential Diagnoses Considered: stroke/tia, vertigo, dehydration, orthostatic hypotension, arrhythmia, electrolyte abnormality    Chronic Medical Conditions Significantly Affecting Care: see above    External Records Reviewed: I reviewed recent and relevant outside records including: PCP notes, prior discharge summary, previous radiologic studies    Diagnostic testing considered: blood, urine, CXR, CT, MRI, EKG    Escalation of Care: Appropriate for inpatient management, observation/admission    Discussion of Management with Other Providers: I discussed the patient/results with: [admitting team        DIAGNOSTIC IMPRESSION: #1 dizziness     Anali Vera, LOLA-CNP  10/07/24 1512       Graham Rivas DO  10/09/24 1415

## 2024-10-08 ENCOUNTER — TELEPHONE (OUTPATIENT)
Dept: CARDIOLOGY | Facility: CLINIC | Age: 74
End: 2024-10-08

## 2024-10-08 LAB
ANION GAP SERPL CALC-SCNC: 10 MMOL/L (ref 10–20)
BUN SERPL-MCNC: 14 MG/DL (ref 6–23)
CALCIUM SERPL-MCNC: 8.9 MG/DL (ref 8.6–10.3)
CHLORIDE SERPL-SCNC: 100 MMOL/L (ref 98–107)
CO2 SERPL-SCNC: 24 MMOL/L (ref 21–32)
CREAT SERPL-MCNC: 0.83 MG/DL (ref 0.5–1.3)
EGFRCR SERPLBLD CKD-EPI 2021: >90 ML/MIN/1.73M*2
ERYTHROCYTE [DISTWIDTH] IN BLOOD BY AUTOMATED COUNT: 12.1 % (ref 11.5–14.5)
GLUCOSE SERPL-MCNC: 104 MG/DL (ref 74–99)
HCT VFR BLD AUTO: 43.2 % (ref 41–52)
HGB BLD-MCNC: 15 G/DL (ref 13.5–17.5)
MCH RBC QN AUTO: 31.6 PG (ref 26–34)
MCHC RBC AUTO-ENTMCNC: 34.7 G/DL (ref 32–36)
MCV RBC AUTO: 91 FL (ref 80–100)
NRBC BLD-RTO: 0 /100 WBCS (ref 0–0)
PLATELET # BLD AUTO: 215 X10*3/UL (ref 150–450)
POTASSIUM SERPL-SCNC: 3.6 MMOL/L (ref 3.5–5.3)
RBC # BLD AUTO: 4.75 X10*6/UL (ref 4.5–5.9)
SODIUM SERPL-SCNC: 130 MMOL/L (ref 136–145)
WBC # BLD AUTO: 9.9 X10*3/UL (ref 4.4–11.3)

## 2024-10-08 PROCEDURE — 2500000001 HC RX 250 WO HCPCS SELF ADMINISTERED DRUGS (ALT 637 FOR MEDICARE OP)

## 2024-10-08 PROCEDURE — 80048 BASIC METABOLIC PNL TOTAL CA: CPT | Performed by: NURSE PRACTITIONER

## 2024-10-08 PROCEDURE — 85027 COMPLETE CBC AUTOMATED: CPT | Performed by: NURSE PRACTITIONER

## 2024-10-08 PROCEDURE — G0378 HOSPITAL OBSERVATION PER HR: HCPCS

## 2024-10-08 PROCEDURE — 99232 SBSQ HOSP IP/OBS MODERATE 35: CPT

## 2024-10-08 PROCEDURE — 2500000004 HC RX 250 GENERAL PHARMACY W/ HCPCS (ALT 636 FOR OP/ED): Performed by: NURSE PRACTITIONER

## 2024-10-08 PROCEDURE — 2500000001 HC RX 250 WO HCPCS SELF ADMINISTERED DRUGS (ALT 637 FOR MEDICARE OP): Performed by: NURSE PRACTITIONER

## 2024-10-08 PROCEDURE — 36415 COLL VENOUS BLD VENIPUNCTURE: CPT | Performed by: NURSE PRACTITIONER

## 2024-10-08 PROCEDURE — 2500000002 HC RX 250 W HCPCS SELF ADMINISTERED DRUGS (ALT 637 FOR MEDICARE OP, ALT 636 FOR OP/ED): Mod: MUE | Performed by: NURSE PRACTITIONER

## 2024-10-08 RX ORDER — LATANOPROST 50 UG/ML
SOLUTION/ DROPS OPHTHALMIC
Status: COMPLETED
Start: 2024-10-08 | End: 2024-10-08

## 2024-10-08 ASSESSMENT — PAIN SCALES - GENERAL
PAINLEVEL_OUTOF10: 2
PAINLEVEL_OUTOF10: 0 - NO PAIN
PAINLEVEL_OUTOF10: 0 - NO PAIN
PAINLEVEL_OUTOF10: 3
PAINLEVEL_OUTOF10: 0 - NO PAIN

## 2024-10-08 ASSESSMENT — PAIN DESCRIPTION - LOCATION
LOCATION: HEAD
LOCATION: SHOULDER

## 2024-10-08 ASSESSMENT — ACTIVITIES OF DAILY LIVING (ADL): LACK_OF_TRANSPORTATION: NO

## 2024-10-08 ASSESSMENT — PAIN - FUNCTIONAL ASSESSMENT: PAIN_FUNCTIONAL_ASSESSMENT: 0-10

## 2024-10-08 ASSESSMENT — PAIN DESCRIPTION - ORIENTATION: ORIENTATION: RIGHT

## 2024-10-08 NOTE — TELEPHONE ENCOUNTER
Pt spouse called office and left voicemail stating pt in hosp yesterday and scheduled for MRI and inquiring if there are any contraindications with loop recorder present.

## 2024-10-08 NOTE — PROGRESS NOTES
Bonifacio Hernandez is a 74 y.o. male on day 0 of admission presenting with Dizziness.      Subjective   Seen and examined this morning.  Patient sitting up in a chair.  Denies pain.  Denies having dizziness today.  Reports difficulty walking, physical therapy and Occupational Therapy assessment ordered.       Objective     Last Recorded Vitals  /60 (BP Location: Left arm, Patient Position: Lying)   Pulse 80   Temp 36.6 °C (97.9 °F) (Temporal)   Resp 20   Wt 81 kg (178 lb 9.2 oz)   SpO2 92%   Intake/Output last 3 Shifts:    Intake/Output Summary (Last 24 hours) at 10/8/2024 1407  Last data filed at 10/8/2024 0900  Gross per 24 hour   Intake 1490 ml   Output --   Net 1490 ml       Admission Weight  Weight: 80.7 kg (178 lb) (10/07/24 1208)    Daily Weight  10/07/24 : 81 kg (178 lb 9.2 oz)    Image Results  ECG 12 lead  Sinus rhythm with Premature atrial complexes  Possible Inferior infarct , age undetermined  Abnormal ECG  When compared with ECG of 23-JAN-2024 11:58,  Premature atrial complexes are now Present  See ED provider note for full interpretation and clinical correlation  Confirmed by Aspen Corbin ([id number]) on 10/7/2024 9:26:37 PM  MR brain wo IV contrast, MR angio neck wo IV contrast, MR angio head wo IV contrast  Narrative: Interpreted By:  Homar Mendez,   STUDY:  MR BRAIN WO IV CONTRAST; MR ANGIO NECK WO IV CONTRAST; MR ANGIO HEAD  WO IV CONTRAST;  10/7/2024 8:17 pm      INDICATION:  Signs/Symptoms:r/o stroke. Stroke protocol.          COMPARISON:  10/07/2024 CT head without contrast      ACCESSION NUMBER(S):  [id number]; [id number]; [id number]      ORDERING CLINICIAN:  ASPEN CORBIN      TECHNIQUE:  Multiplanar, multi-sequence images of the brain were obtained without  contrast.      3D into the time-of-flight MR angiography of the head and neck was  performed. The images were reviewed as source images and maximum  intensity projections.      FINDINGS:  MRI BRAIN:      Diffusion  weighted images show no evidence of acute ischemic infarct.      Mild periventricular and subcortical hemispheric T2/FLAIR white  matter hyperintensities are most compatible with chronic small vessel  ischemic disease. There is redemonstration of encephalomalacia in the  right temporal lobe and parietal lobe consistent with remote MCA  territory infarct. There are extensive bilateral chronic lacunar  infarcts in the cerebellar hemispheres.      The intracranial flow voids are preserved.      There is no acute intraparenchymal hemorrhage, mass, mass-effect, or  an extra-axial fluid collection. There is no pathologic  susceptibility artifact on GRE images.  There is a 5 mm of cerebellar  tonsillar ectopia.      The ventricular size and cerebral volume are age-concordant.      Normal morphology of midline structures. The craniovertebral junction  is normal.      The orbits and globes are unremarkable.      The paranasal sinuses show no air-fluid levels or hemorrhage.      The mastoid air cells are clear.          MRA HEAD:  There is fetal origin of the left posterior cerebral artery.  There is no hemodynamically significant intracranial stenosis or  large vessel occlusion. There is no intracranial aneurysm.          MRA NECK:  Source images are motion degraded.      Mild spin-dephasing artifact is noted at the carotid bulbs.      COMMON/INTERNAL CAROTID ARTERIES: Mild atherosclerosis in the right  carotid bulb. No occlusion, hemodynamically significant stenosis, or  dissection.      VERTEBRAL ARTERIES: The right vertebral artery is non dominant. No  occlusion, hemodynamically significant stenosis, or dissection.      Impression: MRI BRAIN:  1. No acute ischemic infarct, acute hemorrhage, or intracranial mass  effect.  2. Chronic right MCA territory infarct involving the temporal lobe  and parietal lobe.  3. Extensive bilateral cerebellar hemispheric chronic lacunar  infarcts and mild supratentorial chronic ischemic  change.  4. 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      MRA HEAD AND NECK:  1. No evidence of major vessel occlusion or significant stenosis on  MRA head.  2. No evidence of major vessel occlusion or significant stenosis on  MRA neck.      MACRO:  None.      Signed by: Homar Mendez 10/7/2024 9:23 PM  Dictation workstation:   [id number]  XR chest 1 view  Narrative: STUDY:  Chest Radiograph;  10/7/2024 at 12:34 PM.  INDICATION:  Dizziness.  COMPARISON:  XR chest 10/8/2023.  ACCESSION NUMBER(S):  [id number]  ORDERING CLINICIAN:  ASPEN CORBIN  TECHNIQUE:  Frontal chest was obtained at 12:34 hours.  FINDINGS:  CARDIOMEDIASTINAL SILHOUETTE:  Cardiomediastinal silhouette is normal in size and configuration.     LUNGS:  Lungs are clear.     ABDOMEN:  No remarkable upper abdominal findings.     BONES:  No acute osseous changes.  Impression: No acute process.  Signed by Leo Gusman MD  CT brain attack head wo IV contrast  Narrative: Interpreted By:  Sheila Boles,   STUDY:  CT BRAIN ATTACK HEAD WO IV CONTRAST;  10/7/2024 12:44 pm      INDICATION:  Signs/Symptoms:Stroke Evaluation.          COMPARISON:  10/08/2023      ACCESSION NUMBER(S):  [id number]      ORDERING CLINICIAN:  ASPEN CORBIN      TECHNIQUE:  Unenhanced CT images of the head were obtained.      FINDINGS:  Diffuse cerebral atrophy with enlargement of the extra-axial spaces,  cerebral sulci and ventricular system similar to the prior exam.  Low-density right temporoparietal region encephalomalacia and subtle  periventricular white matter hypodensities bilaterally also similar  to the prior study. No acute intracranial hemorrhage or mass-effect.  No midline shift. No extra-axial fluid collection.      No focal calvarial lesion.      Visualized paranasal sinuses are clear.      Impression: No acute intracranial hemorrhage or mass-effect.      MACRO:  Sheila Boles discussed the significance  and urgency of this critical  finding by telephone with  ASPEN SHAQUILLE on 10/7/2024 at 12:48  pm.  (**-RCF-**) Findings:  See findings.          Signed by: Sheila Boles 10/7/2024 12:50 PM  Dictation workstation:   [id number]      Physical Exam  Constitutional:       Appearance: Normal appearance.   HENT:      Head: Normocephalic and atraumatic.      Nose: Nose normal.      Mouth/Throat:      Mouth: Mucous membranes are moist.   Eyes:      Extraocular Movements: Extraocular movements intact.      Conjunctiva/sclera: Conjunctivae normal.      Pupils: Pupils are equal, round, and reactive to light.   Cardiovascular:      Rate and Rhythm: Normal rate and regular rhythm.      Pulses: Normal pulses.      Heart sounds: Normal heart sounds.   Pulmonary:      Effort: Pulmonary effort is normal.      Breath sounds: Normal breath sounds.   Abdominal:      Palpations: Abdomen is soft.   Musculoskeletal:         General: Normal range of motion.      Cervical back: Normal range of motion and neck supple.   Skin:     General: Skin is warm.   Neurological:      Mental Status: He is alert.   Psychiatric:         Mood and Affect: Mood normal.         Thought Content: Thought content normal.         Relevant Results             Scheduled medications  aspirin, 81 mg, oral, Daily  atorvastatin, 40 mg, oral, Daily  latanoprost, 1 drop, Both Eyes, Nightly  pantoprazole, 40 mg, oral, Daily before breakfast  polyethylene glycol, 17 g, oral, Daily  sertraline, 50 mg, oral, Daily  tamsulosin, 0.4 mg, oral, Daily      Continuous medications     PRN medications  PRN medications: acetaminophen **OR** acetaminophen **OR** acetaminophen, fluticasone, meclizine, melatonin, ondansetron ODT **OR** ondansetron  Results for orders placed or performed during the hospital encounter of 10/07/24 (from the past 24 hour(s))   Urinalysis with Reflex Microscopic   Result Value Ref Range    Color, Urine Light-Yellow Light-Yellow, Yellow, Dark-Yellow     Appearance, Urine Clear Clear    Specific Gravity, Urine 1.011 1.005 - 1.035    pH, Urine 7.0 5.0, 5.5, 6.0, 6.5, 7.0, 7.5, 8.0    Protein, Urine NEGATIVE NEGATIVE, 10 (TRACE), 20 (TRACE) mg/dL    Glucose, Urine Normal Normal mg/dL    Blood, Urine NEGATIVE NEGATIVE    Ketones, Urine NEGATIVE NEGATIVE mg/dL    Bilirubin, Urine NEGATIVE NEGATIVE    Urobilinogen, Urine Normal Normal mg/dL    Nitrite, Urine NEGATIVE NEGATIVE    Leukocyte Esterase, Urine NEGATIVE NEGATIVE   CBC   Result Value Ref Range    WBC 9.9 4.4 - 11.3 x10*3/uL    nRBC 0.0 0.0 - 0.0 /100 WBCs    RBC 4.75 4.50 - 5.90 x10*6/uL    Hemoglobin 15.0 13.5 - 17.5 g/dL    Hematocrit 43.2 41.0 - 52.0 %    MCV 91 80 - 100 fL    MCH 31.6 26.0 - 34.0 pg    MCHC 34.7 32.0 - 36.0 g/dL    RDW 12.1 11.5 - 14.5 %    Platelets 215 150 - 450 x10*3/uL   Basic metabolic panel   Result Value Ref Range    Glucose 104 (H) 74 - 99 mg/dL    Sodium 130 (L) 136 - 145 mmol/L    Potassium 3.6 3.5 - 5.3 mmol/L    Chloride 100 98 - 107 mmol/L    Bicarbonate 24 21 - 32 mmol/L    Anion Gap 10 10 - 20 mmol/L    Urea Nitrogen 14 6 - 23 mg/dL    Creatinine 0.83 0.50 - 1.30 mg/dL    eGFR >90 >60 mL/min/1.73m*2    Calcium 8.9 8.6 - 10.3 mg/dL      Assessment/Plan        Bonifacio Hernandez is a 74 y.o. male past medical history of atrial fibrillation/Watchman device, CVA, upper lipidemia, hypertension, BPH, anxiety presented to the emergency department with chief complaint of dizziness from PCP office.  In the ED labs revealed glucose 124, sodium 132 otherwise unremarkable.  CT brain revealed diffuse cerebral atrophy, but no acute intracranial hemorrhage or mass effect.  Chest x-ray is negative for acute processes.  EKG shows sinus rhythm rate of 85 no acute ischemia or injury pattern.  MRI last MRI pending.  Patient was found to be orthostatic positive.  He was given 1 L bolus.  Patient was admitted for further investigation of his dizziness.       Assessment & Plan  Dizziness  -Repeat  orthostats.  Positive in ED given 1 L bolus  -As needed meclizine.   -MRI BRAIN:   1. No acute ischemic infarct, acute hemorrhage, or intracranial mass   effect.   2. Chronic right MCA territory infarct involving the temporal lobe   and parietal lobe.   3. Extensive bilateral cerebellar hemispheric chronic lacunar   infarcts and mild supratentorial chronic ischemic change.   4. 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       -MRA HEAD AND NECK:   1. No evidence of major vessel occlusion or significant stenosis on   MRA head.   2. No evidence of major vessel occlusion or significant stenosis on   MRA neck.   -CT brain negative for acute intracranial hemorrhage or mass effect.  Does show diffuse cerebral atrophy  -UA negative    Difficulty walking  -PT/OT assessment        Chronic conditions:  History of atrial fibrillation  -Status post Watchman device    History of hypertension  -Hold HCTZ and losartan for now blood pressures soft.  Could be contributing to dizziness as well.    History of CVA  -Was on aspirin and Plavix.  Now aspirin only  -Continue Lipitor 40 mg daily    Anxiety  -Continue sertraline    History of BPH  -On Flomax    Full code    Discharge disposition: Based on PT/OT assessment, anticipated discharge in the next 24 hours              LOLA Muhammad-CNP

## 2024-10-08 NOTE — ASSESSMENT & PLAN NOTE
-Repeat orthostats.  Positive in ED given 1 L bolus  -As needed meclizine.   -MRI BRAIN:   1. No acute ischemic infarct, acute hemorrhage, or intracranial mass   effect.   2. Chronic right MCA territory infarct involving the temporal lobe   and parietal lobe.   3. Extensive bilateral cerebellar hemispheric chronic lacunar   infarcts and mild supratentorial chronic ischemic change.   4. 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       -MRA HEAD AND NECK:   1. No evidence of major vessel occlusion or significant stenosis on   MRA head.   2. No evidence of major vessel occlusion or significant stenosis on   MRA neck.   -CT brain negative for acute intracranial hemorrhage or mass effect.  Does show diffuse cerebral atrophy  -UA negative    Difficulty walking  -PT/OT assessment        Chronic conditions:  History of atrial fibrillation  -Status post Watchman device    History of hypertension  -Hold HCTZ and losartan for now blood pressures soft.  Could be contributing to dizziness as well.    History of CVA  -Was on aspirin and Plavix.  Now aspirin only  -Continue Lipitor 40 mg daily    Anxiety  -Continue sertraline    History of BPH  -On Flomax

## 2024-10-08 NOTE — CARE PLAN
Problem: Pain - Adult  Goal: Verbalizes/displays adequate comfort level or baseline comfort level  Outcome: Progressing     Problem: Safety - Adult  Goal: Free from fall injury  Outcome: Progressing     Problem: Discharge Planning  Goal: Discharge to home or other facility with appropriate resources  Outcome: Progressing     Problem: Chronic Conditions and Co-morbidities  Goal: Patient's chronic conditions and co-morbidity symptoms are monitored and maintained or improved  Outcome: Progressing    The clinical goals for the shift include monitor for stroke like symptoms

## 2024-10-08 NOTE — PROGRESS NOTES
10/08/24 1428   Discharge Planning   Living Arrangements Spouse/significant other   Support Systems Spouse/significant other   Assistance Needed None   Type of Residence Private residence   Number of Stairs to Enter Residence 1   Number of Stairs Within Residence 0   Do you have animals or pets at home? No   Who is requesting discharge planning? Provider   Home or Post Acute Services None   Expected Discharge Disposition Home   Does the patient need discharge transport arranged? No   Financial Resource Strain   How hard is it for you to pay for the very basics like food, housing, medical care, and heating? Not hard   Housing Stability   In the last 12 months, was there a time when you were not able to pay the mortgage or rent on time? N   In the past 12 months, how many times have you moved where you were living? 0   At any time in the past 12 months, were you homeless or living in a shelter (including now)? N   Transportation Needs   In the past 12 months, has lack of transportation kept you from medical appointments or from getting medications? no   In the past 12 months, has lack of transportation kept you from meetings, work, or from getting things needed for daily living? No     Care Transitions: Patient reviewed in care round meeting this AM. ADOD 24 hours. Met with patient and wife at bedside. Role of TCC explained. Patient resting with eyes closed, wife answered discharge planning questions. Demographics and contacts verified. PCP Dr. Estrada. Preferred pharmacy is [facility] home delivered for long term prescriptions and [facility] for immediate needs. Denies any difficulty obtaining/affording medications. He is independent at home with all ADL's, drives self. Does not use any DME equipment. [facility] 24 per nursing. Therapy evals pending. Discharge plan is home with wife, denies needs. Care team to follow. Marisa Bonilla RN/TCC

## 2024-10-09 VITALS
RESPIRATION RATE: 18 BRPM | BODY MASS INDEX: 25.56 KG/M2 | TEMPERATURE: 97.9 F | OXYGEN SATURATION: 98 % | HEART RATE: 75 BPM | DIASTOLIC BLOOD PRESSURE: 71 MMHG | SYSTOLIC BLOOD PRESSURE: 121 MMHG | WEIGHT: 178.57 LBS | HEIGHT: 70 IN

## 2024-10-09 LAB
ANION GAP SERPL CALC-SCNC: 8 MMOL/L (ref 10–20)
ANION GAP SERPL CALC-SCNC: 9 MMOL/L (ref 10–20)
BUN SERPL-MCNC: 13 MG/DL (ref 6–23)
BUN SERPL-MCNC: 16 MG/DL (ref 6–23)
CALCIUM SERPL-MCNC: 8.4 MG/DL (ref 8.6–10.3)
CALCIUM SERPL-MCNC: 8.5 MG/DL (ref 8.6–10.3)
CHLORIDE SERPL-SCNC: 99 MMOL/L (ref 98–107)
CHLORIDE SERPL-SCNC: 99 MMOL/L (ref 98–107)
CO2 SERPL-SCNC: 25 MMOL/L (ref 21–32)
CO2 SERPL-SCNC: 26 MMOL/L (ref 21–32)
CREAT SERPL-MCNC: 0.68 MG/DL (ref 0.5–1.3)
CREAT SERPL-MCNC: 0.7 MG/DL (ref 0.5–1.3)
EGFRCR SERPLBLD CKD-EPI 2021: >90 ML/MIN/1.73M*2
EGFRCR SERPLBLD CKD-EPI 2021: >90 ML/MIN/1.73M*2
GLUCOSE SERPL-MCNC: 103 MG/DL (ref 74–99)
GLUCOSE SERPL-MCNC: 93 MG/DL (ref 74–99)
HOLD SPECIMEN: NORMAL
POTASSIUM SERPL-SCNC: 3.8 MMOL/L (ref 3.5–5.3)
POTASSIUM SERPL-SCNC: 4 MMOL/L (ref 3.5–5.3)
SODIUM SERPL-SCNC: 129 MMOL/L (ref 136–145)
SODIUM SERPL-SCNC: 129 MMOL/L (ref 136–145)

## 2024-10-09 PROCEDURE — G0378 HOSPITAL OBSERVATION PER HR: HCPCS

## 2024-10-09 PROCEDURE — 99238 HOSP IP/OBS DSCHRG MGMT 30/<: CPT

## 2024-10-09 PROCEDURE — 2500000004 HC RX 250 GENERAL PHARMACY W/ HCPCS (ALT 636 FOR OP/ED)

## 2024-10-09 PROCEDURE — 96361 HYDRATE IV INFUSION ADD-ON: CPT

## 2024-10-09 PROCEDURE — 2500000004 HC RX 250 GENERAL PHARMACY W/ HCPCS (ALT 636 FOR OP/ED): Performed by: NURSE PRACTITIONER

## 2024-10-09 PROCEDURE — 80048 BASIC METABOLIC PNL TOTAL CA: CPT

## 2024-10-09 PROCEDURE — 2500000002 HC RX 250 W HCPCS SELF ADMINISTERED DRUGS (ALT 637 FOR MEDICARE OP, ALT 636 FOR OP/ED): Mod: MUE | Performed by: NURSE PRACTITIONER

## 2024-10-09 PROCEDURE — 36415 COLL VENOUS BLD VENIPUNCTURE: CPT

## 2024-10-09 PROCEDURE — 97165 OT EVAL LOW COMPLEX 30 MIN: CPT | Mod: GO

## 2024-10-09 PROCEDURE — 2500000001 HC RX 250 WO HCPCS SELF ADMINISTERED DRUGS (ALT 637 FOR MEDICARE OP): Performed by: NURSE PRACTITIONER

## 2024-10-09 PROCEDURE — 97161 PT EVAL LOW COMPLEX 20 MIN: CPT | Mod: GP | Performed by: PHYSICAL THERAPIST

## 2024-10-09 RX ORDER — MECLIZINE HYDROCHLORIDE 25 MG/1
25 TABLET ORAL 3 TIMES DAILY PRN
Qty: 30 TABLET | Refills: 0 | Status: SHIPPED | OUTPATIENT
Start: 2024-10-09 | End: 2024-10-17

## 2024-10-09 ASSESSMENT — COGNITIVE AND FUNCTIONAL STATUS - GENERAL
CLIMB 3 TO 5 STEPS WITH RAILING: A LITTLE
DAILY ACTIVITIY SCORE: 24
STANDING UP FROM CHAIR USING ARMS: A LITTLE
WALKING IN HOSPITAL ROOM: A LITTLE
MOVING TO AND FROM BED TO CHAIR: A LITTLE
MOBILITY SCORE: 20

## 2024-10-09 ASSESSMENT — ACTIVITIES OF DAILY LIVING (ADL)
LACK_OF_TRANSPORTATION: NO
ADL_ASSISTANCE: INDEPENDENT
BATHING_ASSISTANCE: INDEPENDENT
ADL_ASSISTANCE: INDEPENDENT

## 2024-10-09 ASSESSMENT — PAIN SCALES - GENERAL
PAINLEVEL_OUTOF10: 0 - NO PAIN

## 2024-10-09 ASSESSMENT — PAIN - FUNCTIONAL ASSESSMENT
PAIN_FUNCTIONAL_ASSESSMENT: 0-10

## 2024-10-09 NOTE — PROGRESS NOTES
Physical Therapy    Physical Therapy Evaluation    Patient Name: Bonfiacio Hernandez  MRN: [id number]  Today's Date: 10/9/2024   Time Calculation  Start Time: 0923  Stop Time: 0936  Time Calculation (min): 13 min    Assessment/Plan   Skilled PT intervention is indicated due to patient presents with deficits in transfers, gait, strength, activity tolerance, and safety awareness.   Patient unsteady without device for ambulation, recommend FWW until mobility improves.  Recommend continued physical therapy intervention to improve strength, balance, mobility and reduce fall risk.  Continue ambulation with FWW.    PT Assessment  PT Assessment Results: Decreased strength, Decreased endurance, Impaired balance, Decreased mobility, Decreased safety awareness  Rehab Prognosis: Good  Barriers to Discharge: none  Evaluation/Treatment Tolerance: Patient tolerated treatment well  Barriers to Participation:  (none)  End of Session Communication: Bedside nurse  End of Session Patient Position: Up in chair, Alarm on  IP OR SWING BED PT PLAN  Inpatient or Swing Bed: Inpatient  PT Plan  Treatment/Interventions: Bed mobility, Transfer training, Gait training, Balance training, Strengthening, Endurance training, Therapeutic activity, Home exercise program, Therapeutic exercise  PT Plan: Ongoing PT  PT Frequency: 4 times per week  PT Discharge Recommendations: Low intensity level of continued care  Equipment Recommended upon Discharge: Wheeled walker  PT Recommended Transfer Status: Assist x1  PT - OK to Discharge: Yes (once medically appropriate)    Subjective     Current Problem:  Patient Active Problem List   Diagnosis    Abnormal heart rhythm    Acid reflux disease    Lauren's esophagus    Afib (Multi)    Benign essential hypertension    Benign prostatic hyperplasia with urinary obstruction and other lower urinary tract symptoms    CVA (cerebral vascular accident) (Multi)    Dizziness    DJD (degenerative joint disease) of knee     "Osteoarthritis    Enlarged prostate    Heart palpitations    Combined hyperlipidemia    Hyperlipidemia    Insomnia    Left low back pain    Nocturia    Peyronie's disease    Shoulder pain    Urinary frequency    Difficulty walking    Knee stiffness, right    Atrial fibrillation, unspecified type (Multi)       General Visit Information:  General  Reason for Referral: impaired mobiity; 74 year old male admitted for dizziness. pt orthostatic positive in ED and given IV fluids.  -MRI BRAIN:   1. No acute ischemic infarct, acute hemorrhage, or intracranial mass   effect.   2. Chronic right MCA territory infarct involving the temporal lobe   and parietal lobe.   3. Extensive bilateral cerebellar hemispheric chronic lacunar   infarcts and mild supratentorial chronic ischemic change.   4. 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  Referred By: Miranda  Past Medical History Relevant to Rehab: atrial fibrillation/Watchman device, CVA, upper lipidemia, hypertension, BPH, anxiety  Family/Caregiver Present: No  Prior to Session Communication: Bedside nurse  Patient Position Received: Up in chair, Alarm on  General Comment: patient awake in chair on arrival, on phone with spouse, agreeable to therapy; states he is nervous to walk as he feels \"weak\"    Home Living:  Home Living  Type of Home: House  Lives With: Spouse  Home Layout: One level  Home Access: Stairs to enter without rails  Entrance Stairs-Number of Steps: 1  Bathroom Shower/Tub: Walk-in shower  Bathroom Equipment: Grab bars in shower    Prior Level of Function:  Prior Function Per Pt/Caregiver Report  ADL Assistance: Independent  Homemaking Assistance: Independent  Ambulatory Assistance: Independent (no device)    Precautions:  Precautions  Medical Precautions: Fall precautions (orthostatic positive)    Vital Signs:  no concerns     Objective     Pain:  Pain Assessment  Pain Assessment: 0-10  0-10 (Numeric) Pain Score: " "0 - No pain    Cognition:  Cognition  Orientation Level: Oriented X4    General Assessments:  General Observation  General Observation: anxious about mobility   Activity Tolerance  Endurance: Endurance does not limit participation in activity     Strength  Strength Comments: LEs grossly 4/5           Static Sitting Balance  Static Sitting-Balance Support: Feet supported, Bilateral upper extremity supported  Static Sitting-Level of Assistance: Independent  Static Standing Balance  Static Standing-Balance Support: No upper extremity supported  Static Standing-Level of Assistance: Contact guard  Static Standing-Comment/Number of Minutes: standing feet together eyes open 15 sec no LOB;  standing feet apart mod perturbations no LOB; standing feet apart eyes closed 10 sec no LOB  Dynamic Standing Balance  Dynamic Standing-Balance Support: No upper extremity supported  Dynamic Standing-Level of Assistance: Contact guard, Minimum assistance  Dynamic Standing-Balance: Turning    Functional Assessments:     Bed Mobility  Bed Mobility: No  Transfers  Transfer: Yes  Transfer 1  Technique 1: Sit to stand, Stand to sit  Transfer Device 1: Gait belt  Transfer Level of Assistance 1: Contact guard  Transfers 2  Technique 2: Sit to stand, Stand to sit  Transfer Device 2: Gait belt (FWW)  Transfer Level of Assistance 2: Contact guard, Moderate verbal cues  Trials/Comments 2: cue sfor hand placement, safety with transfers  Ambulation/Gait Training  Ambulation/Gait Training Performed: Yes  Ambulation/Gait Training 1  Surface 1: Level tile  Device 1: No device  Gait Support Devices: Gait belt  Assistance 1: Minimum assistance  Quality of Gait 1: Wide base of support, Inconsistent stride length, Shuffling gait  Comments/Distance (ft) 1: 10', very slow and cautios with very short steps, mildly unsteady and c/o feeling \"weak\" in legs  Ambulation/Gait Training 2  Surface 2: Level tile  Device 2: Rolling walker  Gait Support Devices: Gait " belt  Assistance 2: Contact guard, Minimal verbal cues  Comments/Distance (ft) 2: improved step length and more steady, 80'          Outcome Measures:  [facility] Basic Mobility  Turning from your back to your side while in a flat bed without using bedrails: None  Moving from lying on your back to sitting on the side of a flat bed without using bedrails: None  Moving to and from bed to chair (including a wheelchair): A little  Standing up from a chair using your arms (e.g. wheelchair or bedside chair): A little  To walk in hospital room: A little  Climbing 3-5 steps with railing: A little  Basic Mobility - Total Score: 20           Goals:  Encounter Problems       Encounter Problems (Active)       PT Problem       PT Goal 1       Start:  10/09/24    Expected End:  10/22/24       Bonifacio Hernandez will be independent with bed mobility for supine to and from sitting EOB without use of rail           PT Goal 2       Start:  10/09/24    Expected End:  10/22/24       Bonifacio Hernandez will transfer sit to and from stand using least restrictive assistive device mod indep           PT Goal 3       Start:  10/09/24    Expected End:  10/22/24       Bonifacio Hernandez will demonstrate good safety awareness with transfers and mobility and with use of assistive device (proper hand placement).            PT Goal 4       Start:  10/09/24    Expected End:  10/22/24       Bonifacio Hernandez will ambulate 150ft with assistive device , level surface, good balance, steady mod Indep              Pain - Adult            Education Documentation  Mobility Training, taught by Tawny Ramon, PT at 10/9/2024 11:32 AM.  Learner: Patient  Readiness: Acceptance  Method: Explanation, Demonstration  Response: Verbalizes Understanding, Demonstrated Understanding, Needs Reinforcement  Comment: safety with transfers, hand placement, use of FWW    Education Comments  No comments found.

## 2024-10-09 NOTE — DISCHARGE SUMMARY
Discharge Diagnosis  Dizziness    Issues Requiring Follow-Up  Dizziness     Discharge Meds     Medication List      START taking these medications     meclizine 25 mg tablet; Commonly known as: Antivert; Take 1 tablet (25   mg) by mouth 3 times a day as needed for dizziness for up to 7 days.     CONTINUE taking these medications     aspirin 81 mg chewable tablet; Chew 1 tablet (81 mg) once daily.   atorvastatin 40 mg tablet; Commonly known as: Lipitor; Take 1 tablet (40   mg) by mouth once daily.   esomeprazole 20 mg DR capsule; Commonly known as: NexIUM   fluticasone 50 mcg/actuation nasal spray; Commonly known as: Flonase   * ketoconazole 2 % cream; Commonly known as: NIZOral   * ketoconazole 2 % shampoo; Commonly known as: NIZOral   latanoprost 0.005 % ophthalmic solution; Commonly known as: Xalatan   sertraline 50 mg tablet; Commonly known as: Zoloft; Take 1 tablet (50   mg) by mouth once daily.   tamsulosin 0.4 mg 24 hr capsule; Commonly known as: Flomax; TAKE 1   CAPSULE BY MOUTH AT  BEDTIME  * This list has 2 medication(s) that are the same as other medications   prescribed for you. Read the directions carefully, and ask your doctor or   other care provider to review them with you.     STOP taking these medications     hydroCHLOROthiazide 12.5 mg tablet; Commonly known as: Microzide   losartan 50 mg tablet; Commonly known as: Cozaar       Test Results Pending At Discharge  Pending Labs       No current pending labs.            Hospital Course   Bonifacio Hernandez is a 74 y.o. male past medical history of atrial fibrillation/Watchman device, CVA, upper lipidemia, hypertension, BPH, anxiety presented to the emergency department with chief complaint of dizziness from PCP office. In the ED labs revealed glucose 124, sodium 132 otherwise unremarkable. CT brain revealed diffuse cerebral atrophy, but no acute intracranial hemorrhage or mass effect. Chest x-ray is negative for acute processes. EKG shows sinus rhythm rate  of 85 no acute ischemia or injury pattern. MRI last MRI pending. Patient was found to be orthostatic positive. He was given 1 L bolus. Patient was admitted for further investigation of his dizziness on 10/7/2024.  On presentation had positive orthostatic vital signs.  Was given IV fluids and meclizine as needed for dizziness.  MRI of the brain revealed  1. No acute ischemic infarct, acute hemorrhage, or intracranial mass   effect.   2. Chronic right MCA territory infarct involving the temporal lobe   and parietal lobe.   3. Extensive bilateral cerebellar hemispheric chronic lacunar   infarcts and mild supratentorial chronic ischemic change.   4. 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   Work with the physical therapy while at the hospital, was recommended to continue outpatient physical therapy, prescription for standard walker was provided.  Home dose hydrochlorothiazide and losartan is on hold for now due to soft blood pressures.  Patient advised to check his blood pressure 2 times a day at home and record numbers.  He will need to follow-up with PCP in 1 week.    Pertinent Physical Exam At Time of Discharge  Physical Exam  Constitutional:       Appearance: Normal appearance.   HENT:      Head: Normocephalic and atraumatic.      Nose: Nose normal.      Mouth/Throat:      Mouth: Mucous membranes are moist.   Eyes:      Extraocular Movements: Extraocular movements intact.      Conjunctiva/sclera: Conjunctivae normal.      Pupils: Pupils are equal, round, and reactive to light.   Cardiovascular:      Rate and Rhythm: Normal rate and regular rhythm.      Pulses: Normal pulses.      Heart sounds: Normal heart sounds.   Pulmonary:      Effort: Pulmonary effort is normal.      Breath sounds: Normal breath sounds.   Abdominal:      Palpations: Abdomen is soft.   Musculoskeletal:         General: Normal range of motion.      Cervical back: Normal range of motion and neck  supple.   Skin:     General: Skin is warm.   Neurological:      Mental Status: He is alert.   Psychiatric:         Mood and Affect: Mood normal.         Thought Content: Thought content normal.      Outpatient Follow-Up  Future Appointments   Date Time Provider Department [place]   10/15/2024  8:40 AM Bill Villar DO [id number] [facility]   10/17/2024  1:15 PM Papito Gary MD [id number] [facility]   1/23/2025  3:40 PM Carlos Estrada MD [id number] None     Follow-up with PCP in 1 week and other specialties as scheduled    Deanne Jean, LOLA-CNP

## 2024-10-09 NOTE — NURSING NOTE
Discharge Note: 10/9/2024 1433 Discharged via wheelchair to private car accompanied by PCT, personal belongings taken with pt, no distress noted, no complaints voiced. Pedro RANDLE

## 2024-10-09 NOTE — PROGRESS NOTES
10/09/24 1232   Discharge Planning   Living Arrangements Spouse/significant other   Support Systems Spouse/significant other   Assistance Needed None   Type of Residence Private residence   Number of Stairs to Enter Residence 1   Number of Stairs Within Residence 0   Do you have animals or pets at home? No   Who is requesting discharge planning? Provider   Home or Post Acute Services None   Expected Discharge Disposition Home   Does the patient need discharge transport arranged? No   Financial Resource Strain   How hard is it for you to pay for the very basics like food, housing, medical care, and heating? Not hard   Housing Stability   In the last 12 months, was there a time when you were not able to pay the mortgage or rent on time? N   In the past 12 months, how many times have you moved where you were living? 0   At any time in the past 12 months, were you homeless or living in a shelter (including now)? N   Transportation Needs   In the past 12 months, has lack of transportation kept you from medical appointments or from getting medications? no   In the past 12 months, has lack of transportation kept you from meetings, work, or from getting things needed for daily living? No     Plan remains home with wife no needs. PT/OT AMPAC is 24/20. ADOD is today. CT to follow. Yaz Ann BSN/RN-TCC

## 2024-10-09 NOTE — PROGRESS NOTES
Occupational Therapy    Evaluation    Patient Name: Bonifacio Hernandez  MRN: [id number]  Today's Date: 10/9/2024  Time Calculation  Start Time: 0719  Stop Time: 0735  Time Calculation (min): 16 min  316/316-A    Assessment  IP OT Assessment  OT Assessment: pt is at PLOF and able to perform ADLs independently.  no skilled OT services recommended at this time.  Prognosis: Excellent  Barriers to Discharge: None  Evaluation/Treatment Tolerance: Patient tolerated treatment well (no c/o dizziness thorughout)  Medical Staff Made Aware: Yes  End of Session Communication:  (communication on white board)  End of Session Patient Position: Up in chair, Alarm on    Plan:  No Skilled OT: Independent with ADLs  OT Frequency: OT eval only  OT Discharge Recommendations: No further acute OT, No OT needed after discharge  Equipment Recommended upon Discharge: Wheeled walker  OT Recommended Transfer Status: Stand by assist  OT - OK to Discharge: Yes    Subjective     Current Problem:  1. Dizziness            General:  General  Reason for Referral: 74 year old male admitted for dizziness. pt orthostatic positive in ED and given IV fluids.  -MRI BRAIN:   1. No acute ischemic infarct, acute hemorrhage, or intracranial mass   effect.   2. Chronic right MCA territory infarct involving the temporal lobe   and parietal lobe.   3. Extensive bilateral cerebellar hemispheric chronic lacunar   infarcts and mild supratentorial chronic ischemic change.   4. 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  Referred By: Miranda  Past Medical History Relevant to Rehab: atrial fibrillation/Watchman device, CVA, upper lipidemia, hypertension, BPH, anxiety  Family/Caregiver Present: No  Prior to Session Communication: Bedside nurse, PCT/NA/CTA  Patient Position Received: Up in bathroom (PCT present)  General Comment: pt agreeable to assessment    Precautions:  Medical Precautions: Fall precautions (orthostatic  positive)    Vital Signs:   Orthostatic taken by nursing- positive orthostatics    Pain:  Pain Assessment  Pain Assessment: 0-10  0-10 (Numeric) Pain Score: 0 - No pain    Objective     Cognition:  Overall Cognitive Status: Within Functional Limits  Orientation Level: Oriented X4             Home Living:  Type of Home: House  Lives With: Spouse  Home Layout: One level  Home Access: Stairs to enter without rails  Entrance Stairs-Number of Steps: 1     Prior Function:  ADL Assistance: Independent  Homemaking Assistance: Independent  Ambulatory Assistance: Independent (no device)      ADL:  Eating Assistance: Independent  Grooming Assistance: Independent  Bathing Assistance: Independent  UE Dressing Assistance: Independent  LE Dressing Assistance: Independent  Toileting Assistance with Device: Independent  Functional Assistance: Stand by (MI with FWW)    Activity Tolerance:  Endurance: Endurance does not limit participation in activity    Bed Mobility/Transfers:   Bed Mobility  Bed Mobility: No  Transfers  Transfer: Yes  Transfer 1  Technique 1: Sit to stand, Stand to sit  Transfer Device 1: Walker  Transfer Level of Assistance 1: Modified independent    Ambulation/Gait Training:  Functional Mobility  Functional Mobility Performed: Yes  Functional Mobility 1  Surface 1: Level tile  Device 1: Rolling walker  Functional Mobility Support Devices: Gait belt  Assistance 1: Close supervision (due to orthostatic hypotension)         Vision: Vision - Basic Assessment  Current Vision: Wears glasses all the time      Strength:  Strength Comments: BUE WNL      Coordination:  Movements are Fluid and Coordinated: Yes       Outcome Measures: [facility] Daily Activity  Putting on and taking off regular lower body clothing: None  Bathing (including washing, rinsing, drying): None  Putting on and taking off regular upper body clothing: None  Toileting, which includes using toilet, bedpan or urinal: None  Taking care of personal grooming  such as brushing teeth: None  Eating Meals: None  Daily Activity - Total Score: 24

## 2024-10-09 NOTE — CARE PLAN
Problem: Pain - Adult  Goal: Verbalizes/displays adequate comfort level or baseline comfort level  Outcome: Progressing     Problem: Safety - Adult  Goal: Free from fall injury  Outcome: Progressing     Problem: Discharge Planning  Goal: Discharge to home or other facility with appropriate resources  Outcome: Progressing     Problem: Chronic Conditions and Co-morbidities  Goal: Patient's chronic conditions and co-morbidity symptoms are monitored and maintained or improved  Outcome: Progressing   The patient's goals for the shift include go home today     The clinical goals for the shift include decrease dizziness, monitor for stroke like symptoms

## 2024-10-09 NOTE — NURSING NOTE
Discharge Note: 10/9/2024 1410 AVS and pt responsibilities reviewed with pt, pt wife, and copy given. Dizziness, TIA, Stroke, BE FAST, education reviewed with pt, pt wife, and information sheets given. Pt and wife verbalizes understanding of instructions received, verbalizes understanding of when to seek medical attention, denies any home going or personal care needs. Denies further questions or concerns. Reviewed follow up appts with pt, pt wife, and verbalizes understanding. Pedro RANDLE

## 2024-10-10 ENCOUNTER — PATIENT OUTREACH (OUTPATIENT)
Age: 74
End: 2024-10-10
Payer: MEDICARE

## 2024-10-10 ENCOUNTER — TELEPHONE (OUTPATIENT)
Age: 74
End: 2024-10-10
Payer: MEDICARE

## 2024-10-10 DIAGNOSIS — R42 DIZZINESS: ICD-10-CM

## 2024-10-10 NOTE — TELEPHONE ENCOUNTER
----- Message from Taryn Aguilar sent at 10/10/2024  2:44 PM EDT -----  Regarding: TCM appt needed  Discharge facility:[facility]  Discharge diagnosis: Dizziness    Date of discharge: 10/9/24       Unsuccessful attempts x2 to reach patient for PCP Follow-up  Please have office staff reach out to patient and schedule an appointment within 14 days from discharge date.      Needs to be seen by 10/22/24

## 2024-10-10 NOTE — PROGRESS NOTES
Discharge Facility:[facility]  Discharge Diagnosis:Dizziness  Admission Date:10/7/24  Discharge Date:10/9/24    PCP Appointment Date:No contact made message sent to office  Specialist Appointment Date:TBD   Hospital Encounter and Summary Linked: Yes    2 call attempts made

## 2024-10-15 ENCOUNTER — APPOINTMENT (OUTPATIENT)
Dept: GASTROENTEROLOGY | Facility: CLINIC | Age: 74
End: 2024-10-15
Payer: MEDICARE

## 2024-10-16 ENCOUNTER — APPOINTMENT (OUTPATIENT)
Age: 74
End: 2024-10-16
Payer: COMMERCIAL

## 2024-10-16 ENCOUNTER — LAB (OUTPATIENT)
Facility: LAB | Age: 74
End: 2024-10-16
Payer: COMMERCIAL

## 2024-10-16 VITALS
OXYGEN SATURATION: 97 % | DIASTOLIC BLOOD PRESSURE: 70 MMHG | HEIGHT: 70 IN | BODY MASS INDEX: 26.2 KG/M2 | HEART RATE: 74 BPM | WEIGHT: 183 LBS | SYSTOLIC BLOOD PRESSURE: 142 MMHG

## 2024-10-16 DIAGNOSIS — I10 BENIGN ESSENTIAL HYPERTENSION: ICD-10-CM

## 2024-10-16 DIAGNOSIS — I10 BENIGN ESSENTIAL HYPERTENSION: Primary | ICD-10-CM

## 2024-10-16 DIAGNOSIS — I63.339 CEREBROVASCULAR ACCIDENT (CVA) DUE TO THROMBOSIS OF POSTERIOR CEREBRAL ARTERY, UNSPECIFIED BLOOD VESSEL LATERALITY (MULTI): ICD-10-CM

## 2024-10-16 LAB
ANION GAP SERPL CALC-SCNC: 9 MMOL/L (ref 10–20)
BUN SERPL-MCNC: 14 MG/DL (ref 6–23)
CALCIUM SERPL-MCNC: 9.5 MG/DL (ref 8.6–10.3)
CHLORIDE SERPL-SCNC: 102 MMOL/L (ref 98–107)
CO2 SERPL-SCNC: 31 MMOL/L (ref 21–32)
CREAT SERPL-MCNC: 0.78 MG/DL (ref 0.5–1.3)
EGFRCR SERPLBLD CKD-EPI 2021: >90 ML/MIN/1.73M*2
GLUCOSE SERPL-MCNC: 93 MG/DL (ref 74–99)
POTASSIUM SERPL-SCNC: 4.6 MMOL/L (ref 3.5–5.3)
SODIUM SERPL-SCNC: 137 MMOL/L (ref 136–145)

## 2024-10-16 PROCEDURE — 3077F SYST BP >= 140 MM HG: CPT | Performed by: FAMILY MEDICINE

## 2024-10-16 PROCEDURE — 99214 OFFICE O/P EST MOD 30 MIN: CPT | Performed by: FAMILY MEDICINE

## 2024-10-16 PROCEDURE — 80048 BASIC METABOLIC PNL TOTAL CA: CPT

## 2024-10-16 PROCEDURE — 1160F RVW MEDS BY RX/DR IN RCRD: CPT | Performed by: FAMILY MEDICINE

## 2024-10-16 PROCEDURE — 1157F ADVNC CARE PLAN IN RCRD: CPT | Performed by: FAMILY MEDICINE

## 2024-10-16 PROCEDURE — 36415 COLL VENOUS BLD VENIPUNCTURE: CPT

## 2024-10-16 PROCEDURE — 1036F TOBACCO NON-USER: CPT | Performed by: FAMILY MEDICINE

## 2024-10-16 PROCEDURE — 1159F MED LIST DOCD IN RCRD: CPT | Performed by: FAMILY MEDICINE

## 2024-10-16 PROCEDURE — 3078F DIAST BP <80 MM HG: CPT | Performed by: FAMILY MEDICINE

## 2024-10-16 NOTE — PROGRESS NOTES
"Subjective   Patient ID: Bonifacio Hernandez is a 74 y.o. male who presents for Hospital Follow-up.    HPI when last seen I sent him to the emergency room because of his history of stroke and the profound vertigo he was having.  He was admitted and during hospitalization found to have a sodium of 132 and hydrochlorothiazide and losartan were stopped.  The only other medicine that could cause of the hyponatremia is sertraline and this is maintained at this time we will check today.  Reviewed MRI and angio and discussed that he has evidence of lacunar infarcts cerebellum, microvascular disease cerebrum, encephalomalacia from previous CVA.  To start therapy as requested during hospitalization  Had Watchman procedure and is no longer on Eliquis  Review of Systems  Denies chest pains palpitations cough shortness of breath heartburn or abdominal pain  Objective   /70   Pulse 74   Ht 1.778 m (5' 10\")   Wt 83 kg (183 lb)   SpO2 97%   BMI 26.26 kg/m²     Physical Exam  Alert and orient  Heart rate controlled  Lungs clear to auscultation  Assessment/Plan   Problem List Items Addressed This Visit             ICD-10-CM    Benign essential hypertension - Primary I10    Relevant Orders    Basic metabolic panel    Follow Up In Primary Care    CVA (cerebral vascular accident) (Multi) I63.9     Will check basic metabolic profile today and follow-up 1 month     "

## 2024-10-17 ENCOUNTER — APPOINTMENT (OUTPATIENT)
Dept: CARDIOLOGY | Facility: CLINIC | Age: 74
End: 2024-10-17
Payer: MEDICARE

## 2024-10-17 VITALS
HEART RATE: 76 BPM | SYSTOLIC BLOOD PRESSURE: 144 MMHG | BODY MASS INDEX: 26.34 KG/M2 | OXYGEN SATURATION: 98 % | WEIGHT: 184 LBS | HEIGHT: 70 IN | DIASTOLIC BLOOD PRESSURE: 82 MMHG

## 2024-10-17 DIAGNOSIS — R42 DIZZINESS: ICD-10-CM

## 2024-10-17 DIAGNOSIS — Z95.818 PRESENCE OF WATCHMAN LEFT ATRIAL APPENDAGE CLOSURE DEVICE: ICD-10-CM

## 2024-10-17 DIAGNOSIS — I63.339 CEREBROVASCULAR ACCIDENT (CVA) DUE TO THROMBOSIS OF POSTERIOR CEREBRAL ARTERY, UNSPECIFIED BLOOD VESSEL LATERALITY (MULTI): Primary | ICD-10-CM

## 2024-10-17 DIAGNOSIS — I48.0 PAROXYSMAL ATRIAL FIBRILLATION (MULTI): Primary | ICD-10-CM

## 2024-10-17 PROCEDURE — 1159F MED LIST DOCD IN RCRD: CPT | Performed by: STUDENT IN AN ORGANIZED HEALTH CARE EDUCATION/TRAINING PROGRAM

## 2024-10-17 PROCEDURE — 3079F DIAST BP 80-89 MM HG: CPT | Performed by: STUDENT IN AN ORGANIZED HEALTH CARE EDUCATION/TRAINING PROGRAM

## 2024-10-17 PROCEDURE — 99214 OFFICE O/P EST MOD 30 MIN: CPT | Performed by: STUDENT IN AN ORGANIZED HEALTH CARE EDUCATION/TRAINING PROGRAM

## 2024-10-17 PROCEDURE — 1036F TOBACCO NON-USER: CPT | Performed by: STUDENT IN AN ORGANIZED HEALTH CARE EDUCATION/TRAINING PROGRAM

## 2024-10-17 PROCEDURE — 3077F SYST BP >= 140 MM HG: CPT | Performed by: STUDENT IN AN ORGANIZED HEALTH CARE EDUCATION/TRAINING PROGRAM

## 2024-10-17 PROCEDURE — 1157F ADVNC CARE PLAN IN RCRD: CPT | Performed by: STUDENT IN AN ORGANIZED HEALTH CARE EDUCATION/TRAINING PROGRAM

## 2024-10-17 NOTE — PROGRESS NOTES
Cardiology Subsequent Encounter Clinic Note  Name: Bonifacio Hernandez  MRN: [id number]  : 1950    CC: CVA    Virtual visit over the telephone    Active Issues:  Bonifacio Hernandez is a 74 y.o. 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      Problem #2 hypertension     Problem #3 hyperlipidemia  - on atorvastatin 80 mg     At his last visit the patient endorsed an episode of fall/significant dizziness; there was a concern for him being on long-term anticoagulation for which she underwent left atrial appendage occluder with a Watchman device in 2024.  Now on aspirin only  Since his last visit he has had occasional episodes of dizziness particularly postural.  Admitted to the hospital with the same; workup was largely unrevealing.    Past Medical History  Past Medical History:   Diagnosis Date    Arthritis     Glaucoma     Ischemic stroke (Multi)     Personal history of other diseases of the circulatory system 2021    History of cardiac arrhythmia       Past Surgical History  Past Surgical History:   Procedure Laterality Date    CARDIAC CATHETERIZATION N/A 2024    Procedure: LAAO (Left Atrial Appendage Occlusion);  Surgeon: Bj Robles MD;  Location: [address] Cardiac Cath Lab;  Service: Cardiovascular;  Laterality: N/A;  Same day CT at 1200    OTHER SURGICAL HISTORY  2019    Varicose vein ligation    OTHER SURGICAL HISTORY  2019    Inguinal hernia repair    OTHER SURGICAL HISTORY  2019    Esophagogastroduodenoscopy    OTHER SURGICAL HISTORY  10/15/2019    Back surgery    OTHER SURGICAL HISTORY  2020    Colonoscopy    OTHER SURGICAL HISTORY  2023    Loop monitor    TOTAL KNEE ARTHROPLASTY Right 2023    TOTAL KNEE ARTHROPLASTY Left        Medications  Current Outpatient  "Medications on File Prior to Visit   Medication Sig Dispense Refill    aspirin 81 mg chewable tablet Chew 1 tablet (81 mg) once daily. 90 tablet 3    atorvastatin (Lipitor) 40 mg tablet Take 1 tablet (40 mg) by mouth once daily. 90 tablet 3    esomeprazole (NexIUM) 20 mg DR capsule Take 1 capsule (20 mg) by mouth once daily. Do not open capsule.      fluticasone (Flonase) 50 mcg/actuation nasal spray Administer 1 spray into each nostril once daily as needed for rhinitis. Shake gently. Before first use, prime pump. After use, clean tip and replace cap.      ketoconazole (NIZOral) 2 % cream Apply 1 Application topically 2 times a day.      ketoconazole (NIZOral) 2 % shampoo Apply 1 Application topically every other day.      latanoprost (Xalatan) 0.005 % ophthalmic solution Administer 1 drop into both eyes once daily at bedtime.      meclizine (Antivert) 25 mg tablet Take 1 tablet (25 mg) by mouth 3 times a day as needed for dizziness for up to 7 days. 30 tablet 0    sertraline (Zoloft) 50 mg tablet Take 1 tablet (50 mg) by mouth once daily. 30 tablet 11    tamsulosin (Flomax) 0.4 mg 24 hr capsule TAKE 1 CAPSULE BY MOUTH AT  BEDTIME 90 capsule 3     No current facility-administered medications on file prior to visit.       Allergies  Allergies   Allergen Reactions    Bee Pollen Other and Runny nose    Adhesive Tape-Silicones Other and Rash     EKG adhesive    Lisinopril Cough and Other     cough       Social History  Social History     Tobacco Use    Smoking status: Never    Smokeless tobacco: Never   Vaping Use    Vaping status: Never Used   Substance Use Topics    Alcohol use: Never    Drug use: Never       Family History  Family History   Problem Relation Name Age of Onset    Emphysema Father      Hypertension Sister      Pulmonary embolism Sister         Physical Examination  Vitals: /82   Pulse 76   Ht 1.778 m (5' 10\")   Wt 83.5 kg (184 lb)   SpO2 98%   BMI 26.40 kg/m²   General: awake, alert and " oriented. No acute distress.   Skin: Skin is warm, dry and intact without rashes or lesions. Appropriate color for ethnicity. Nail beds pink with no cyanosis or clubbing  HEENT: normocephalic, atraumatic; conjunctivae are clear without exudates or hemorrhage. Sclera is non-icteric. Eyelids are normal in appearance without swelling or lesions. Hearing intact. Nares are patent bilaterally. Moist mucous membranes.   Cardiovascular: Regular. No murmurs, gallops, or rubs are auscultated. S1 and S2 are heard and are of normal intensity. No JVD, no carotid bruits  Respiratory: Thorax symmetric. CTAB, breath sounds vesicular. No crackles, wheezes or ronchi.   Gastrointestinal: soft, non-distended, BS + x 4  Genitourinary: exam deferred  Musculoskeletal: moves all extremities  Extremities: pulses palpable bilaterally; no swelling or erythema; no edema  Neurological: alert & oriented x 3; no focal deficits  Psychiatric: appropriate mood and affect       Labs/Imaging/Procedures    Lab Results   Component Value Date    HGB 15.0 10/08/2024    HGB 15.4 10/07/2024    HGB 14.0 01/09/2024     10/08/2024    WBC 9.9 10/08/2024     10/16/2024    K 4.6 10/16/2024    CREATININE 0.78 10/16/2024    CREATININE 0.68 10/09/2024    CREATININE 0.70 10/09/2024    BUN 14 10/16/2024    CALCIUM 9.5 10/16/2024    INR 1.1 10/07/2024    TROPHS 8 10/07/2024    TROPHS 6 10/08/2023    TROPHS 6 10/08/2023    LDLF 80 11/01/2022     Transthoracic Echo (TTE) Limited    Result Date: 1/23/2024   [facility], [address] [place] [address]                Tel [phone] and Fax [phone] TRANSTHORACIC ECHOCARDIOGRAM REPORT  Patient Name:      JENNA Paredes Physician:    [id number] Andrea Cabrera MD Study Date:        1/23/2024           Ordering Provider:    [id number] HUSSEIN MRN/PID:            [id number]            Fellow:               [id number] Carlos Chadwick MD PhD Accession#:        [id number]        Nurse: Date of Birth/Age: 1950 / 73 years Sonographer:          Mavis Waters RDCS Gender:            M                   Additional Staff: Height:            177.80 cm           Admit Date: Weight:            78.47 kg            Admission Status:     Inpatient - Routine BSA:               1.96 m2             Encounter#:           [id number]                                        Department Location:  [facility] Cath                                                              Lab Blood Pressure: 120 /36 mmHg Study Type:    TRANSTHORACIC ECHO (TTE) LIMITED Diagnosis/ICD: Presence of other cardiac implants and grafts-Z95.818 Indication:    s/p LAAO CPT Code:      Echo Limited-23622  Study Detail: The following Echo studies were performed: 2D.  PHYSICIAN INTERPRETATION: Left Ventricle: The left ventricular systolic function is normal, with an estimated ejection fraction of 60-65%. There are no regional wall motion abnormalities. The left ventricular cavity size is normal. Left ventricular diastolic filling was not assessed. Left Atrium: The left atrium is normal in size. Right Ventricle: The right ventricle was not assessed. Right ventricular systolic function not assessed. Right Atrium: The right atrium was not well visualized. Aortic Valve: The aortic valve is trileaflet. Aortic valve regurgitation was not assessed. Mitral Valve: The mitral valve is normal in structure. There is mild mitral annular calcification. Mitral valve regurgitation was not assessed. Tricuspid Valve: The tricuspid valve is structurally normal. Tricuspid regurgitation was not assessed. Pulmonic Valve: The pulmonic valve is not well visualized. Pulmonic valve regurgitation was not assessed. Pericardium: There is no pericardial effusion noted. Aorta: The  aortic root was not assessed. In comparison to the previous echocardiogram(s): Compared with study from 1/23/2024, no significant change.  CONCLUSIONS:  1. Left ventricular systolic function is normal with a 60-65% estimated ejection fraction.  2. Limited TTE post LAAO Watchman placement with no pericardial effusion. QUANTITATIVE DATA SUMMARY:  [id number] Andrea Cabrera MD Electronically signed on 1/23/2024 at 5:44:20 PM  ** Final **     Transthoracic Echo (TTE) Limited    Result Date: 1/23/2024   [facility], [address] [place] [address]                Tel [phone] and Fax [phone] TRANSTHORACIC ECHOCARDIOGRAM REPORT  Patient Name:     JENNA Paredes Physician:  [id number] Radha Plunkett MD Study Date:       1/23/2024          Ordering Provider:  [id number] HUGO HUSSEIN MRN/PID:          [id number]           Fellow: Accession#:       [id number]       Nurse: Date of           1950 / 73      Sonographer:        Mavis Waters [facility] Birth/Age:        years Gender:           M                  Additional Staff: Height:           177.80 cm          Admit Date: Weight:           78.47 kg           Admission Status:   Inpatient - Routine BSA:              1.96 m2            Encounter#:         [id number]                                      [facility] Cath                                      Location:           Lab Blood Pressure: 144 /66 mmHg Study Type:    TRANSTHORACIC ECHO (TTE) LIMITED Diagnosis/ICD: Encounter for preprocedural cardiovascular examination-Z01.810 Indication:    Pre LAAO CPT Code:      Echo Limited-72075  Study Detail: The following Echo studies were performed: 2D. Technically               challenging study due to patient lying in supine position.  PHYSICIAN INTERPRETATION: Left Ventricle: The left ventricular systolic function is normal, with an estimated ejection fraction of 60-65%.  There are no regional wall motion abnormalities. The left ventricular cavity size is normal. Left ventricular diastolic filling was not assessed. Left Atrium: The left atrium is normal in size. Right Ventricle: The right ventricle was not assessed. Right ventricular systolic function not assessed. Grossly normal RV systolic function though not quantified. Right Atrium: The right atrium is upper limits of normal in size. Aortic Valve: The aortic valve is trileaflet. Aortic valve regurgitation was not assessed. Mitral Valve: The mitral valve is normal in structure. Mitral valve regurgitation was not assessed. Tricuspid Valve: The tricuspid valve is structurally normal. Tricuspid regurgitation was not assessed. Pulmonic Valve: The pulmonic valve is not well visualized. Pulmonic valve regurgitation was not assessed. Pericardium: There is no pericardial effusion noted. Aorta: The aortic root is abnormal. There is mild dilatation of the aortic root. In comparison to the previous echocardiogram(s): Compared with the prior exam rom 11/1/2022 today's exam is only a limited exam. There was normal LV and RV systolic function at that time with out significiant valvular pathology.  CONCLUSIONS:  1. Left ventricular systolic function is normal with a 60-65% estimated ejection fraction.  2. Grossly normal RV systolic function though not quantified.  3. Limited TTE pre LAAO Watchman closure device placement.  4. Compared with the prior exam rom 11/1/2022 today's exam is only a limited exam. There was normal LV and RV systolic function at that time with out significiant valvular pathology. QUANTITATIVE DATA SUMMARY: LA VOLUME:                              Normal Ranges: LA Vol A4C:        63.5 ml   (22+/-6mL/m2) LA Vol Index A4C:  32.4ml/m2 LA Area A4C:       17.5 cm2 LA Major Axis A4C: 4.1 cm AORTA MEASUREMENTS:                      Normal Ranges: Ao Sinus, d: 3.80 cm (2.1-3.5cm)  [id number] Radha Plunkett MD Electronically signed on  1/23/2024 at 1:18:41 PM  ** Final **    ECG 12 lead  Sinus rhythm with Premature atrial complexes  Possible Inferior infarct , age undetermined  Abnormal ECG  When compared with ECG of 23-JAN-2024 11:58,  Premature atrial complexes are now Present  See ED provider note for full interpretation and clinical correlation  Confirmed by Aspen Corbin ([id number]) on 10/7/2024 9:26:37 PM  MR brain wo IV contrast, MR angio neck wo IV contrast, MR angio head wo IV contrast  Narrative: Interpreted By:  Homar Mendez,   STUDY:  MR BRAIN WO IV CONTRAST; MR ANGIO NECK WO IV CONTRAST; MR ANGIO HEAD  WO IV CONTRAST;  10/7/2024 8:17 pm      INDICATION:  Signs/Symptoms:r/o stroke. Stroke protocol.          COMPARISON:  10/07/2024 CT head without contrast      ACCESSION NUMBER(S):  [id number]; [id number]; [id number]      ORDERING CLINICIAN:  ASPEN CORBIN      TECHNIQUE:  Multiplanar, multi-sequence images of the brain were obtained without  contrast.      3D into the time-of-flight MR angiography of the head and neck was  performed. The images were reviewed as source images and maximum  intensity projections.      FINDINGS:  MRI BRAIN:      Diffusion weighted images show no evidence of acute ischemic infarct.      Mild periventricular and subcortical hemispheric T2/FLAIR white  matter hyperintensities are most compatible with chronic small vessel  ischemic disease. There is redemonstration of encephalomalacia in the  right temporal lobe and parietal lobe consistent with remote MCA  territory infarct. There are extensive bilateral chronic lacunar  infarcts in the cerebellar hemispheres.      The intracranial flow voids are preserved.      There is no acute intraparenchymal hemorrhage, mass, mass-effect, or  an extra-axial fluid collection. There is no pathologic  susceptibility artifact on GRE images.  There is a 5 mm of cerebellar  tonsillar ectopia.      The ventricular size and cerebral volume are  age-concordant.      Normal morphology of midline structures. The craniovertebral junction  is normal.      The orbits and globes are unremarkable.      The paranasal sinuses show no air-fluid levels or hemorrhage.      The mastoid air cells are clear.          MRA HEAD:  There is fetal origin of the left posterior cerebral artery.  There is no hemodynamically significant intracranial stenosis or  large vessel occlusion. There is no intracranial aneurysm.          MRA NECK:  Source images are motion degraded.      Mild spin-dephasing artifact is noted at the carotid bulbs.      COMMON/INTERNAL CAROTID ARTERIES: Mild atherosclerosis in the right  carotid bulb. No occlusion, hemodynamically significant stenosis, or  dissection.      VERTEBRAL ARTERIES: The right vertebral artery is non dominant. No  occlusion, hemodynamically significant stenosis, or dissection.      Impression: MRI BRAIN:  1. No acute ischemic infarct, acute hemorrhage, or intracranial mass  effect.  2. Chronic right MCA territory infarct involving the temporal lobe  and parietal lobe.  3. Extensive bilateral cerebellar hemispheric chronic lacunar  infarcts and mild supratentorial chronic ischemic change.  4. 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      MRA HEAD AND NECK:  1. No evidence of major vessel occlusion or significant stenosis on  MRA head.  2. No evidence of major vessel occlusion or significant stenosis on  MRA neck.      MACRO:  None.      Signed by: Homar Mendez 10/7/2024 9:23 PM  Dictation workstation:   [id number]  XR chest 1 view  Narrative: STUDY:  Chest Radiograph;  10/7/2024 at 12:34 PM.  INDICATION:  Dizziness.  COMPARISON:  XR chest 10/8/2023.  ACCESSION NUMBER(S):  [id number]  ORDERING CLINICIAN:  ASPEN CORBIN  TECHNIQUE:  Frontal chest was obtained at 12:34 hours.  FINDINGS:  CARDIOMEDIASTINAL SILHOUETTE:  Cardiomediastinal silhouette is normal in size and  configuration.     LUNGS:  Lungs are clear.     ABDOMEN:  No remarkable upper abdominal findings.     BONES:  No acute osseous changes.  Impression: No acute process.  Signed by Leo Gusman MD  CT brain attack head wo IV contrast  Narrative: Interpreted By:  Sheila Boles,   STUDY:  CT BRAIN ATTACK HEAD WO IV CONTRAST;  10/7/2024 12:44 pm      INDICATION:  Signs/Symptoms:Stroke Evaluation.          COMPARISON:  10/08/2023      ACCESSION NUMBER(S):  [id number]      ORDERING CLINICIAN:  ASPEN CORBIN      TECHNIQUE:  Unenhanced CT images of the head were obtained.      FINDINGS:  Diffuse cerebral atrophy with enlargement of the extra-axial spaces,  cerebral sulci and ventricular system similar to the prior exam.  Low-density right temporoparietal region encephalomalacia and subtle  periventricular white matter hypodensities bilaterally also similar  to the prior study. No acute intracranial hemorrhage or mass-effect.  No midline shift. No extra-axial fluid collection.      No focal calvarial lesion.      Visualized paranasal sinuses are clear.      Impression: No acute intracranial hemorrhage or mass-effect.      MACRO:  Sheila Boles discussed the significance and urgency of this critical  finding by telephone with  ASPEN CORBIN on 10/7/2024 at 12:48  pm.  (**-RCF-**) Findings:  See findings.          Signed by: Sheila Boles 10/7/2024 12:50 PM  Dictation workstation:   [id number]      Impression  Bonifacio Hernandez is a 74 y.o. male with a medical history of hypertension, hyperlipidemia, abnormal event monitor here to establish care regarding the following conditions:     Problem #1 recent CVA  -Posterior MCA CVA without lasting neurological deficits October 2022.   -Status post watchman 1/23/2024.  Currently on aspirin     Problem #2 hypertension     Problem #3 hyperlipidemia  - on atorvastatin 80 mg; LDL at goal January 2024     Plan:  -He is now status post watchman.  Continue aspirin  -Atrial  fibrillation appears to be paroxysmal/nonvalvular  -Uncertain if paroxysms of atrial fibrillation might be contributing to dizziness.  Will perform a 5-day event monitor  -RTC 1 year  Papito Gary MD  Advanced Heart Failure/Transplant Cardiology  Cardio-Oncology  [facility]

## 2024-10-18 ENCOUNTER — EVALUATION (OUTPATIENT)
Dept: PHYSICAL THERAPY | Facility: CLINIC | Age: 74
End: 2024-10-18
Payer: MEDICARE

## 2024-10-18 DIAGNOSIS — R26.9 ABNORMALITY OF GAIT: Primary | ICD-10-CM

## 2024-10-18 DIAGNOSIS — R42 DIZZINESS: ICD-10-CM

## 2024-10-18 PROCEDURE — 97110 THERAPEUTIC EXERCISES: CPT | Mod: GP

## 2024-10-18 PROCEDURE — 97161 PT EVAL LOW COMPLEX 20 MIN: CPT | Mod: GP

## 2024-10-18 ASSESSMENT — PAIN - FUNCTIONAL ASSESSMENT: PAIN_FUNCTIONAL_ASSESSMENT: 0-10

## 2024-10-18 ASSESSMENT — PATIENT HEALTH QUESTIONNAIRE - PHQ9
SUM OF ALL RESPONSES TO PHQ9 QUESTIONS 1 AND 2: 0
2. FEELING DOWN, DEPRESSED OR HOPELESS: NOT AT ALL
1. LITTLE INTEREST OR PLEASURE IN DOING THINGS: NOT AT ALL

## 2024-10-18 ASSESSMENT — ENCOUNTER SYMPTOMS
OCCASIONAL FEELINGS OF UNSTEADINESS: 1
DEPRESSION: 0
LOSS OF SENSATION IN FEET: 1

## 2024-10-18 ASSESSMENT — PAIN SCALES - GENERAL: PAINLEVEL_OUTOF10: 0 - NO PAIN

## 2024-10-18 NOTE — PROGRESS NOTES
Physical Therapy    Physical Therapy Lower Extremity Evaluation    Patient Name: Bonifacio Hernandez  MRN: [id number]  Today's Date: 10/18/2024  Time Calculation  Start Time: 0918  Stop Time: 1015  Time Calculation (min): 57 min  PT Evaluation Time Entry  PT Evaluation (Low) Time Entry: 40  PT Therapeutic Procedures Time Entry  Therapeutic Exercise Time Entry: 15                   Current Problem  Problem List Items Addressed This Visit             ICD-10-CM    Dizziness R42    Relevant Orders    Follow Up In Physical Therapy    Abnormality of gait - Primary R26.9    Relevant Orders    Follow Up In Physical Therapy          SUBJECTIVE  Subjective   General  Reason for Referral: dizziness  Referred By: Natalie  Past Medical History Relevant to Rehab: B TKA, CVA 2022, back surgery 2018  General Comment: 1/16Patient was in the hospital on Monday 10/7/24, he was having dizziness and he was having difficulty moving his legs and feet when he wanted to. He spent 3 days at the hospital, they did scans and changed medication because they felt his bp was dropping too low and his sodium was also too low. His wife also thinks he had some positive orthostatic hypotension in the hospital as well.   He notes he feels the dizzy spells when he changes position from sitting to standing quickly, when he looks up quickly. He has been having them on and off since his stroke in 2022.  Had a Watchman put in and is off his blood thinners due to him having a fall and them being concerned with him being on blood thinners.  He is now using a walker since his discharge. He was previously independent with all ADL's, could cook meals if needed.   Saw cardiologist on 10/17/24 and he was uncertain if paroxysms of atrial fibrillation might be contributing to dizziness. Will perform a 5-day event monitor that starts on 10/28/24.    Precautions  Precautions  STEADI Fall Risk Score (The score of 4 or more indicates an increased risk of falling): 7  Precautions  Comment: high fall risk; h/o CVA     Ambulatory Screenings Summary       Screening  Frequency  Date Last Completed   Spiritual and Cultural Beliefs   Screening  each visit or episode of care 10/18/2024   Falls Risk Screening  every ambulatory visit [unfilled]   Pain Screening  annually at primary care visit     Domestic Violence screening  annually at primary care visit    Elder Abuse Screening  annually at primary care visit    Depression Screening  annually in the primary care setting 10/18/2024   Suicide Risk Screening  annually in the primary care setting 10/7/2024   Nutrition and Food Insecurity   Screening  at least annually at primary care visit     Key Learner  annually in the primary care setting 10/18/2024   Drug Screen  10/17/2024  1:47 PM   Alcohol Screen  10/17/2024  1:47 PM   Advance Directive  1/15/2024       Pain  Alcohol Use  Never  None  Never        Patient verified by name and .  Chief complaint:    Imaging: MRI in hospital    Prior level of function:   Level of [place]: Independent with ADLs and functional transfers, Independent with homemaking with ambulation    Current limitations: stairs, now needs an assistive device    Home setup:  Home Living Comment: lives with his wife in a 1 story home with bedroom and bathroom, has 16 steps to the basement with HR's where his bike is    Work: retired    Patients goal: less dizziness and get stronger with better balance    Prior tx: in the hospital    OBJECTIVE:    Lower Extremity Strength:  MMT 5/5 max  RIGHT LEFT   Hip Flexion 4- 4   Hip Extension     Hip Abduction 4- 4-   Hip Adduction     Knee Extension 5- 4   Knee Flexion 4 4+   Ankle DF 4 4   Ankle PF 3+ 4-   Ankle INV     Ankle EV       Gait mechanics: shortened step length and height, shuffled gait, loss of balance with turns    Outcome Measure     Timed Up and Go Test  How many seconds did it take to complete the 5 tasks?: 31 seconds with FWW; 34 sec without walker and demonstrates  LOB    TREATMENT:  Standing hip abd x 10 B  Standing hip ext x 10 B  Standing heel raises x 10 B  Mini squats x 10     OP EDUAccess Code: [email or web address]  URL: [email or web address]  Date: 10/18/2024  Prepared by: Karen Vargas    Exercises  - Standing Hip Extension with Counter Support  - 1 x daily - 7 x weekly - 3 sets - 10 reps  - Standing Hip Abduction  - 1 x daily - 7 x weekly - 3 sets - 10 reps  - Standing Heel Raises  - 1 x daily - 7 x weekly - 3 sets - 10 reps  - Mini Squat  - 1 x daily - 7 x weekly - 3 sets - 10 reps    ASSESSEMENT  Pt is a 74 y.o. referred to physical therapy for a dx of dizziness following a recent hospitalization with history of a CVA and a fib with possible contribution by Carlos Estrada MD . Pt presents with impairments of PT Assessment Results: Decreased strength, Decreased endurance, Impaired balance, Decreased mobility, Decreased coordination, Decreased safety awareness  Rehab Prognosis: Good. This pt would benefit from a therapy program to restore prior level of function, reduce pain, increase AROM, increase strength, and improve gait and balance.     The physical therapy prognosis is good for the patient to achieve their goals.   The pt tolerated therapy treatment today well with no adverse effects.  Barriers to therapy include:  no barriers to learning    Eval Complexity  Moderate    PLAN  PT Frequency: 2 times per week  Duration: 8 weeks      The pt has been educated about the risks and benefits of physical therapy including manual therapy treatments and gives consent for treatment.     The patient will benefit from physical therapy treatment to include: Treatment/Interventions: Education/ Instruction, Gait training, Manual therapy, Neuromuscular re-education, Self care/ home management, Therapeutic exercises       Goals:  Active       Dizzy       Patient will demonstrate understanding of HEP to augment PT POC and continue with management of dizziness after  discharge        Start:  10/18/24    Expected End:  12/13/24            Patient will score 16 or less on DHI to allow for improved ease with navigating his community safely       Start:  10/18/24    Expected End:  12/13/24            Patient will demonstrate improved B foot clearance during gait to allow for ability to ambulate without his walker for return to PLOF       Start:  10/18/24    Expected End:  12/13/24            Patient will perform TUG in 24 seconds or less to allow for reduced risk of falls       Start:  10/18/24    Expected End:  11/15/24

## 2024-10-21 ENCOUNTER — HOSPITAL ENCOUNTER (OUTPATIENT)
Dept: CARDIOLOGY | Facility: HOSPITAL | Age: 74
Discharge: HOME | End: 2024-10-21
Payer: MEDICARE

## 2024-10-21 DIAGNOSIS — I48.0 PAROXYSMAL ATRIAL FIBRILLATION (MULTI): ICD-10-CM

## 2024-10-21 DIAGNOSIS — Z95.818 PRESENCE OF WATCHMAN LEFT ATRIAL APPENDAGE CLOSURE DEVICE: ICD-10-CM

## 2024-10-21 DIAGNOSIS — R42 DIZZINESS: ICD-10-CM

## 2024-10-21 PROCEDURE — 9420000001 HC RT PATIENT EDUCATION 5 MIN

## 2024-10-21 PROCEDURE — 93242 EXT ECG>48HR<7D RECORDING: CPT

## 2024-10-22 ENCOUNTER — TREATMENT (OUTPATIENT)
Dept: PHYSICAL THERAPY | Facility: CLINIC | Age: 74
End: 2024-10-22
Payer: MEDICARE

## 2024-10-22 DIAGNOSIS — R26.9 ABNORMALITY OF GAIT: ICD-10-CM

## 2024-10-22 DIAGNOSIS — R42 DIZZINESS: ICD-10-CM

## 2024-10-22 PROCEDURE — 97110 THERAPEUTIC EXERCISES: CPT | Mod: GP,CQ

## 2024-10-22 PROCEDURE — 97112 NEUROMUSCULAR REEDUCATION: CPT | Mod: GP,CQ

## 2024-10-22 ASSESSMENT — PAIN SCALES - GENERAL: PAINLEVEL_OUTOF10: 0 - NO PAIN

## 2024-10-22 ASSESSMENT — PAIN - FUNCTIONAL ASSESSMENT: PAIN_FUNCTIONAL_ASSESSMENT: 0-10

## 2024-10-22 NOTE — PROGRESS NOTES
"Physical Therapy Treatment    Patient Name: Bonifacio Hernandez  MRN: [id number]  Today's Date: 10/22/2024  Time Calculation  Start Time: 0917  Stop Time: 0957  Time Calculation (min): 40 min  PT Therapeutic Procedures Time Entry  Neuromuscular Re-Education Time Entry: 15  Therapeutic Exercise Time Entry: 23       Assessment:   Patient challenged with tandem stance this date requiring intermittent fingertip touching through trial. Patient challenged with proprioception and step taps this date for foot clearance with ambulation. Patient demo's no change in symptoms pre and post session.     Plan:  Continue to work on improving strength and dizziness to be able to return to yardwork with little to no difficulty. -AB.     OP PT Plan  Treatment/Interventions: Education/ Instruction, Gait training, Manual therapy, Neuromuscular re-education, Self care/ home management, Therapeutic exercises  PT Plan: Skilled PT  PT Frequency: 2 times per week  Duration: 8 weeks  Onset Date: 10/07/24  Certification Period Start Date: 10/18/24  Certification Period End Date: 01/16/25  Rehab Potential: Good  Plan of Care Agreement: Patient    Current Problem  Problem List Items Addressed This Visit             ICD-10-CM    Dizziness R42    Abnormality of gait R26.9       Subjective   General  Reason for Referral: dizziness  Referred By: Natalie  Past Medical History Relevant to Rehab: B TKA, CVA 2022, back surgery 2018  General Comment: 2/16  Visit: 2  HEP: Yes  Patient reports having no pain today. Reports that he is compliant with his HEP. States that he wants to be able to get back to doing his yardwork.     Precautions  Precautions  Precautions Comment: high fall risk; h/o CVA    Pain  Pain Assessment: 0-10  0-10 (Numeric) Pain Score: 0 - No pain    Objective     Treatments:  Therapeutic Exercise: 23 minutes, 2 units  SciFit x5' (N)  Slantboard 2x1' (N)  Step ups 6\" step 2x10 Bilat Fwd (N)  Standing hip abd 2x10 B (P, reps)   Standing hip ext " "2x10 B (P, reps)   Standing heel raises 2x10 B (P, reps)   Mini squats x15 (P, reps)    Neuromuscular Reeducation: 15 minutes, 1 units   Side stepping in // bars x4 (N)  SLS 3x30\" Bilat 2 fingertip support (N)  Tandem stance 3x30\" Bilat (N)  Step taps on airex 3x30\" (N)    OP EDUCATION:   Access Code: [email or web address]  URL: [email or web address]  Date: 10/18/2024  Prepared by: Karen Vargas    Exercises  - Standing Hip Extension with Counter Support  - 1 x daily - 7 x weekly - 3 sets - 10 reps  - Standing Hip Abduction  - 1 x daily - 7 x weekly - 3 sets - 10 reps  - Standing Heel Raises  - 1 x daily - 7 x weekly - 3 sets - 10 reps  - Mini Squat  - 1 x daily - 7 x weekly - 3 sets - 10 reps    Goals:  Active       Dizzy       Patient will demonstrate understanding of HEP to augment PT POC and continue with management of dizziness after discharge        Start:  10/18/24    Expected End:  12/13/24            Patient will score 16 or less on DHI to allow for improved ease with navigating his community safely       Start:  10/18/24    Expected End:  12/13/24            Patient will demonstrate improved B foot clearance during gait to allow for ability to ambulate without his walker for return to PLOF       Start:  10/18/24    Expected End:  12/13/24            Patient will perform TUG in 24 seconds or less to allow for reduced risk of falls       Start:  10/18/24    Expected End:  11/15/24               "

## 2024-10-24 ENCOUNTER — PATIENT OUTREACH (OUTPATIENT)
Age: 74
End: 2024-10-24
Payer: MEDICARE

## 2024-10-24 PROBLEM — R42 DIZZINESS: Status: RESOLVED | Noted: 2023-02-08 | Resolved: 2024-10-24

## 2024-10-25 ENCOUNTER — DOCUMENTATION (OUTPATIENT)
Dept: PHYSICAL THERAPY | Facility: CLINIC | Age: 74
End: 2024-10-25
Payer: MEDICARE

## 2024-10-25 NOTE — PROGRESS NOTES
Physical Therapy                 Therapy Communication Note    Patient Name: Bonifacio Hernandez  MRN: [id number]  Department:   Room: Room/bed info not found  Today's Date: 10/25/2024     Discipline: Physical Therapy    Missed Visit Reason:      Missed Time: No Show    Comment:

## 2024-11-01 ENCOUNTER — TREATMENT (OUTPATIENT)
Dept: PHYSICAL THERAPY | Facility: CLINIC | Age: 74
End: 2024-11-01
Payer: MEDICARE

## 2024-11-01 DIAGNOSIS — R26.9 ABNORMALITY OF GAIT: ICD-10-CM

## 2024-11-01 DIAGNOSIS — R42 DIZZINESS: ICD-10-CM

## 2024-11-01 PROCEDURE — 97112 NEUROMUSCULAR REEDUCATION: CPT | Mod: GP,CQ

## 2024-11-01 PROCEDURE — 97110 THERAPEUTIC EXERCISES: CPT | Mod: GP,CQ

## 2024-11-01 ASSESSMENT — PAIN SCALES - GENERAL: PAINLEVEL_OUTOF10: 0 - NO PAIN

## 2024-11-01 ASSESSMENT — PAIN - FUNCTIONAL ASSESSMENT: PAIN_FUNCTIONAL_ASSESSMENT: 0-10

## 2024-11-04 ENCOUNTER — HOSPITAL ENCOUNTER (OUTPATIENT)
Dept: CARDIOLOGY | Facility: CLINIC | Age: 74
Discharge: HOME | End: 2024-11-04
Payer: MEDICARE

## 2024-11-04 DIAGNOSIS — I48.0 PAROXYSMAL ATRIAL FIBRILLATION (MULTI): ICD-10-CM

## 2024-11-04 DIAGNOSIS — Z95.818 IMPLANTABLE LOOP RECORDER PRESENT: ICD-10-CM

## 2024-11-04 PROCEDURE — 93298 REM INTERROG DEV EVAL SCRMS: CPT

## 2024-11-05 ENCOUNTER — HOSPITAL ENCOUNTER (OUTPATIENT)
Dept: GASTROENTEROLOGY | Facility: CLINIC | Age: 74
Discharge: HOME | End: 2024-11-05
Payer: MEDICARE

## 2024-11-05 VITALS
BODY MASS INDEX: 25.08 KG/M2 | SYSTOLIC BLOOD PRESSURE: 113 MMHG | DIASTOLIC BLOOD PRESSURE: 72 MMHG | WEIGHT: 175.2 LBS | RESPIRATION RATE: 18 BRPM | HEIGHT: 70 IN | HEART RATE: 63 BPM | TEMPERATURE: 97.8 F | OXYGEN SATURATION: 94 %

## 2024-11-05 DIAGNOSIS — Z86.0100 HISTORY OF COLONIC POLYPS: ICD-10-CM

## 2024-11-05 PROCEDURE — 3700000012 HC SEDATION LEVEL 5+ TIME - INITIAL 15 MINUTES 5/> YEARS

## 2024-11-05 PROCEDURE — 7100000009 HC PHASE TWO TIME - INITIAL BASE CHARGE

## 2024-11-05 PROCEDURE — G0105 COLORECTAL SCRN; HI RISK IND: HCPCS | Performed by: INTERNAL MEDICINE

## 2024-11-05 PROCEDURE — 45378 DIAGNOSTIC COLONOSCOPY: CPT | Mod: 52 | Performed by: INTERNAL MEDICINE

## 2024-11-05 PROCEDURE — 2500000004 HC RX 250 GENERAL PHARMACY W/ HCPCS (ALT 636 FOR OP/ED): Performed by: INTERNAL MEDICINE

## 2024-11-05 PROCEDURE — 7100000010 HC PHASE TWO TIME - EACH INCREMENTAL 1 MINUTE

## 2024-11-05 RX ORDER — MIDAZOLAM HYDROCHLORIDE 1 MG/ML
INJECTION, SOLUTION INTRAMUSCULAR; INTRAVENOUS AS NEEDED
Status: COMPLETED | OUTPATIENT
Start: 2024-11-05 | End: 2024-11-05

## 2024-11-05 RX ORDER — MEPERIDINE HYDROCHLORIDE 25 MG/ML
INJECTION INTRAMUSCULAR; INTRAVENOUS; SUBCUTANEOUS AS NEEDED
Status: COMPLETED | OUTPATIENT
Start: 2024-11-05 | End: 2024-11-05

## 2024-11-05 RX ORDER — MEPERIDINE HYDROCHLORIDE 50 MG/ML
INJECTION INTRAMUSCULAR; INTRAVENOUS; SUBCUTANEOUS AS NEEDED
Status: COMPLETED | OUTPATIENT
Start: 2024-11-05 | End: 2024-11-05

## 2024-11-05 ASSESSMENT — PAIN SCALES - GENERAL
PAINLEVEL_OUTOF10: 0 - NO PAIN

## 2024-11-05 ASSESSMENT — PAIN - FUNCTIONAL ASSESSMENT
PAIN_FUNCTIONAL_ASSESSMENT: 0-10
PAIN_FUNCTIONAL_ASSESSMENT: 0-10

## 2024-11-05 NOTE — DISCHARGE INSTRUCTIONS
Patient Instructions after a Colonoscopy      The anesthetics, sedatives or narcotics which were given to you today will be acting in your body for the next 24 hours, so you might feel a little sleepy or groggy.  This feeling should slowly wear off. Carefully read and follow the instructions.     You received sedation today:  - Do not drive or operate any machinery or power tools of any kind.   - No alcoholic beverages today, not even beer or wine.  - Do not make any important decisions or sign any legal documents.  - No over the counter medications that contain alcohol or that may cause drowsiness.  - Do not make any important decisions or sign any legal documents.  - Make sure you have someone with you for first 24 hours.    While it is common to experience mild to moderate abdominal distention, gas, or belching after your procedure, if any of these symptoms occur following discharge from the GI Lab or within one week of having your procedure, call the [facility] to be advised whether a visit to your nearest Urgent Care or Emergency Department is indicated.  Take this paper with you if you go.     - If you develop an allergic reaction to the medications that were given during your procedure such as difficulty breathing, rash, hives, severe nausea, vomiting or lightheadedness.  - If you experience chest pain, shortness of breath, severe abdominal pain, fevers and chills.  -If you develop signs and symptoms of bleeding such as blood in your spit, if your stools turn black, tarry, or bloody  - If you have not urinated within 8 hours following your procedure.  - If your IV site becomes painful, red, inflamed, or looks infected.    If you received a biopsy/polypectomy/sphincterotomy the following instructions apply below:    __ Do not use Aspirin containing products, non-steroidal medications or anti-coagulants for one week following your procedure. (Examples of these types of medications are: Advil,  Arthrotec, Aleve, Coumadin, Ecotrin, Heparin, Ibuprofen, Indocin, Motrin, Naprosyn, Nuprin, Plavix, Vioxx, and Voltarin, or their generic forms.  This list is not all-inclusive.  Check with your physician or pharmacist before resuming medications.)   __ Eat a soft diet today.  Avoid foods that are poorly digested for the next 24 hours.  These foods would include: nuts, beans, lettuce, red meats, and fried foods. Start with liquids and advance your diet as tolerated, gradually work up to eating solids.   __ Do not have a Barium Study or Enema for one week.    Your physician recommends the additional following instructions:    -You have a contact number available for emergencies. The signs and symptoms of potential delayed complications were discussed with you. You may return to normal activities tomorrow.  -Resume your previous diet.  -Continue your present medications.   -We are waiting for your pathology results.  -Your physician has recommended a repeat colonoscopy (date to be determined after pending pathology results are reviewed) for surveillance based on pathology results.  -The findings and recommendations have been discussed with you.  -The findings and recommendations were discussed with your family.  - Please see Medication Reconciliation Form for new medication/medications prescribed.

## 2024-11-05 NOTE — H&P
History Of Present Illness  Bonifacio Hernandez is a 74 y.o. male presenting with history of colon polyps.  Last colonoscopy was in 2019 with removal of adenomatous polyp.  Presents today for follow-up..     Past Medical History  Past Medical History:   Diagnosis Date    Arthritis     GERD (gastroesophageal reflux disease)     Glaucoma     Hyperlipidemia     Ischemic stroke (Multi)     Personal history of other diseases of the circulatory system 09/02/2021    History of cardiac arrhythmia     Surgical History  Past Surgical History:   Procedure Laterality Date    CARDIAC CATHETERIZATION N/A 01/23/2024    Procedure: LAAO (Left Atrial Appendage Occlusion);  Surgeon: Bj Robles MD;  Location: [address] Cardiac Cath Lab;  Service: Cardiovascular;  Laterality: N/A;  Same day CT at 1200    OTHER SURGICAL HISTORY  09/16/2019    Varicose vein ligation    OTHER SURGICAL HISTORY  09/16/2019    Inguinal hernia repair    OTHER SURGICAL HISTORY  09/16/2019    Esophagogastroduodenoscopy    OTHER SURGICAL HISTORY  10/15/2019    Back surgery    OTHER SURGICAL HISTORY  08/28/2020    Colonoscopy    OTHER SURGICAL HISTORY  01/2023    Loop monitor    OTHER SURGICAL HISTORY      watchman    TOTAL KNEE ARTHROPLASTY Right 11/28/2023    TOTAL KNEE ARTHROPLASTY Left 2014     Social History  He reports that he has never smoked. He has never used smokeless tobacco. He reports that he does not drink alcohol and does not use drugs.    Family History  Family History   Problem Relation Name Age of Onset    Emphysema Father      Hypertension Sister      Pulmonary embolism Sister          Allergies  Allergies   Allergen Reactions    Bee Pollen Other and Runny nose    Adhesive Tape-Silicones Other and Rash     EKG adhesive    Lisinopril Cough and Other     cough     Review of Systems  Pre-sedation Evaluation:  ASA Classification - ASA 2 - Patient with mild systemic disease with no functional limitations  Mallampati Score - II (hard and soft palate,  "upper portion of tonsils and uvula visible)    Physical Exam  Vitals and nursing note reviewed.   Constitutional:       Appearance: Normal appearance.   HENT:      Head: Normocephalic.      Mouth/Throat:      Mouth: Mucous membranes are moist.      Pharynx: Oropharynx is clear.   Eyes:      Pupils: Pupils are equal, round, and reactive to light.   Cardiovascular:      Rate and Rhythm: Normal rate and regular rhythm.      Heart sounds: Normal heart sounds.   Pulmonary:      Effort: Pulmonary effort is normal.      Breath sounds: Normal breath sounds.   Abdominal:      General: Abdomen is flat. Bowel sounds are normal.      Palpations: Abdomen is soft.   Musculoskeletal:         General: Normal range of motion.      Cervical back: Normal range of motion and neck supple.   Skin:     General: Skin is warm and dry.   Neurological:      General: No focal deficit present.      Mental Status: He is alert and oriented to person, place, and time.   Psychiatric:         Mood and Affect: Mood normal.         Behavior: Behavior normal.          Last Recorded Vitals  Blood pressure 167/88, pulse 77, temperature 36.6 °C (97.8 °F), temperature source Temporal, resp. rate 18, height 1.778 m (5' 10\"), weight 79.5 kg (175 lb 3.2 oz), SpO2 96%.     Assessment/Plan   Problem List Items Addressed This Visit    None  Visit Diagnoses       History of colonic polyps        Relevant Orders    Colonoscopy Screening; High Risk Patient; hx of colon polyps               PTA/Current Medications:  (Not in a hospital admission)    Current Outpatient Medications   Medication Sig Dispense Refill    aspirin 81 mg chewable tablet Chew 1 tablet (81 mg) once daily. 90 tablet 3    atorvastatin (Lipitor) 40 mg tablet Take 1 tablet (40 mg) by mouth once daily. 90 tablet 3    esomeprazole (NexIUM) 20 mg DR capsule Take 1 capsule (20 mg) by mouth once daily. Do not open capsule.      fluticasone (Flonase) 50 mcg/actuation nasal spray Administer 1 spray into " each nostril once daily as needed for rhinitis. Shake gently. Before first use, prime pump. After use, clean tip and replace cap.      ketoconazole (NIZOral) 2 % cream Apply 1 Application topically 2 times a day.      ketoconazole (NIZOral) 2 % shampoo Apply 1 Application topically every other day.      latanoprost (Xalatan) 0.005 % ophthalmic solution Administer 1 drop into both eyes once daily at bedtime.      sertraline (Zoloft) 50 mg tablet Take 1 tablet (50 mg) by mouth once daily. 30 tablet 11    tamsulosin (Flomax) 0.4 mg 24 hr capsule TAKE 1 CAPSULE BY MOUTH AT  BEDTIME 90 capsule 3     No current facility-administered medications for this encounter.     Bill Villar, DO

## 2024-11-06 ENCOUNTER — TREATMENT (OUTPATIENT)
Dept: PHYSICAL THERAPY | Facility: CLINIC | Age: 74
End: 2024-11-06
Payer: MEDICARE

## 2024-11-06 DIAGNOSIS — R42 DIZZINESS: ICD-10-CM

## 2024-11-06 DIAGNOSIS — R26.9 ABNORMALITY OF GAIT: ICD-10-CM

## 2024-11-06 PROCEDURE — 97112 NEUROMUSCULAR REEDUCATION: CPT | Mod: GP,CQ

## 2024-11-06 PROCEDURE — 97110 THERAPEUTIC EXERCISES: CPT | Mod: GP,CQ

## 2024-11-06 ASSESSMENT — PAIN - FUNCTIONAL ASSESSMENT: PAIN_FUNCTIONAL_ASSESSMENT: 0-10

## 2024-11-06 ASSESSMENT — PAIN SCALES - GENERAL: PAINLEVEL_OUTOF10: 0 - NO PAIN

## 2024-11-06 NOTE — PROGRESS NOTES
"Physical Therapy Treatment    Patient Name: Bonifacio Hernandez  MRN: [id number]  Today's Date: 11/7/2024  Time Calculation  Start Time: 1130  Stop Time: 1210  Time Calculation (min): 40 min  PT Therapeutic Procedures Time Entry  Neuromuscular Re-Education Time Entry: 24  Therapeutic Exercise Time Entry: 15              Current Problem  Problem List Items Addressed This Visit             ICD-10-CM       Symptoms and Signs    Dizziness R42    Abnormality of gait R26.9         Subjective He reports Dizziness with bending over and standing up quick and turning quick but feels the intensity has decreased overall. Denies pain.   General  Reason for Referral: dizziness  Referred By: Natalie  Past Medical History Relevant to Rehab: B TKA, CVA 2022, back surgery 2018  General Comment: 4/16  Precautions  Precautions  Precautions Comment: high fall risk; h/o CVA  Pain  Pain Assessment: 0-10  0-10 (Numeric) Pain Score: 0 - No pain  Objective:    Assessment:Patient identified by name and birth date. This session required 1 UE support with SLS activities. Required verbal and visual cues for exercise instruction. He tends to move quickly yet does state quick movements produce dizziness. No c/o dizziness during session.      Plan: Cont with VOR and NMRE to decrease intensity and frequency of dizziness for improved I and safety with ADL's.-CG.           OP PT Plan  Treatment/Interventions: Education/ Instruction, Gait training, Manual therapy, Neuromuscular re-education, Self care/ home management, Therapeutic exercises  PT Plan: Skilled PT  PT Frequency: 2 times per week  Duration: 8 weeks  Onset Date: 10/07/24  Certification Period Start Date: 10/18/24  Certification Period End Date: 01/16/25  Rehab Potential: Good  Plan of Care Agreement: Patient          Treatments:   Therapeutic Exercise:   SciFit x6'   Slantboard 2x1'   Step ups 6\" step 2x10 Bilat Fwd   Lateral Step Ups  6\" step 10x each   Standing hip abd 2x10 B with lime grn band (P " "resist)  Standing hip ext 2x10 B with lime grn band (P resist_)  Standing heel raises 2x10 B   Mini squats x15     Neuromuscular Reeducation:   Side stepping in // bars x4 laps with lime grn band (P resist)  SLS 3x30\" Bilat 2 fingertip support   Tandem stance 3x30\" Bilat   Step taps on airex 3x30\"   High Marching gait fwd/bwd in // bars 3 laps   VOR x1  Horizontal /Vertical 30\"  x 3 each   Saccades  vertical /horizontal 30 \" x3 each       OP EDUCATION:     Access Code: [email or web address]  URL: [email or web address]  Date: 10/18/2024  Prepared by: Karen Vargas     Exercises  - Standing Hip Extension with Counter Support  - 1 x daily - 7 x weekly - 3 sets - 10 reps  - Standing Hip Abduction  - 1 x daily - 7 x weekly - 3 sets - 10 reps  - Standing Heel Raises  - 1 x daily - 7 x weekly - 3 sets - 10 reps  - Mini Squat  - 1 x daily - 7 x weekly - 3 sets - 10 reps     Goals:  Active       Dizzy       Patient will demonstrate understanding of HEP to augment PT POC and continue with management of dizziness after discharge        Start:  10/18/24    Expected End:  12/13/24            Patient will score 16 or less on DHI to allow for improved ease with navigating his community safely       Start:  10/18/24    Expected End:  12/13/24            Patient will demonstrate improved B foot clearance during gait to allow for ability to ambulate without his walker for return to PLOF       Start:  10/18/24    Expected End:  12/13/24            Patient will perform TUG in 24 seconds or less to allow for reduced risk of falls       Start:  10/18/24    Expected End:  11/15/24               "

## 2024-11-08 ENCOUNTER — TREATMENT (OUTPATIENT)
Dept: PHYSICAL THERAPY | Facility: CLINIC | Age: 74
End: 2024-11-08
Payer: MEDICARE

## 2024-11-08 DIAGNOSIS — R42 DIZZINESS: ICD-10-CM

## 2024-11-08 DIAGNOSIS — R26.9 ABNORMALITY OF GAIT: ICD-10-CM

## 2024-11-08 PROCEDURE — 97112 NEUROMUSCULAR REEDUCATION: CPT | Mod: GP,CQ

## 2024-11-08 PROCEDURE — 97110 THERAPEUTIC EXERCISES: CPT | Mod: GP,CQ

## 2024-11-08 ASSESSMENT — PAIN - FUNCTIONAL ASSESSMENT: PAIN_FUNCTIONAL_ASSESSMENT: 0-10

## 2024-11-08 ASSESSMENT — PAIN SCALES - GENERAL: PAINLEVEL_OUTOF10: 0 - NO PAIN

## 2024-11-08 NOTE — PROGRESS NOTES
"Physical Therapy Treatment    Patient Name: Bonifacio Hernandez  MRN: [id number]  Today's Date: 11/8/2024  Time Calculation  Start Time: 1135  Stop Time: 1215  Time Calculation (min): 40 min  PT Therapeutic Procedures Time Entry  Neuromuscular Re-Education Time Entry: 23  Therapeutic Exercise Time Entry: 16              Current Problem  Problem List Items Addressed This Visit             ICD-10-CM       Symptoms and Signs    Dizziness R42    Abnormality of gait R26.9         Subjective He reports feeling good. States his frequency and intensity of dizziness has decreased.   General  Reason for Referral: dizziness  Referred By: Natalie  Past Medical History Relevant to Rehab: B TKA, CVA 2022, back surgery 2018  General Comment: 5/16  Precautions  Precautions  Precautions Comment: high fall risk; h/o CVA  Pain  Pain Assessment: 0-10  0-10 (Numeric) Pain Score: 0 - No pain  Objective:    Assessment:Patient identified by name and birth date. Difficulty walking with head turns a/cervical rotation as he could not coordinate the movements.  Did progress balance activities wuth 1 UE support needed.        Plan: Cont with NMRE and CKC strengthening to improve safety and I with ADLs and to decrease  frequency and intensity of dizziness. -CG        OP PT Plan  Treatment/Interventions: Education/ Instruction, Gait training, Manual therapy, Neuromuscular re-education, Self care/ home management, Therapeutic exercises  PT Plan: Skilled PT  PT Frequency: 2 times per week  Duration: 8 weeks  Onset Date: 10/07/24  Certification Period Start Date: 10/18/24  Certification Period End Date: 01/16/25  Rehab Potential: Good  Plan of Care Agreement: Patient          Treatments:   Therapeutic Exercise:   SciFit x6'   Slantboard 2x1'   Step ups 6\" step 2x10 Bilat Fwd   Lateral Step Ups  6\" step 15x each (P reps)  Standing hip abd 2x10 B with lime grn band on Red Airex (P surface)  Standing hip ext 2x10 B with lime grn band on Red Airex (P " "surface)  Standing Hip Flex 2x10 B with grn lome band on Red Airex (N)  Standing heel raises 2x10 B  on Red Airex (P surface)  Mini squats x15     Neuromuscular Reeducation:   Side stepping in // bars x4 laps with lime grn band   SLS 3x30\" Bilat 2 fingertip support   Tandem stance 3x30\" Bilat   Step taps on airex 3x30\"   High Marching gait fwd/bwd in // bars 3 laps   Tandem Gait FWD/BWD 3 laps in // bars (N)  Attempts at gait with head nods and rotation unable to coordinate. (N)  VOR x1  Horizontal /Vertical 30\"  x 3 each   Saccades  vertical /horizontal 30 \" x3 each          OP EDUCATION:   Access Code: [email or web address]  URL: [email or web address]  Date: 10/18/2024  Prepared by: Karen Vargas     Exercises  - Standing Hip Extension with Counter Support  - 1 x daily - 7 x weekly - 3 sets - 10 reps  - Standing Hip Abduction  - 1 x daily - 7 x weekly - 3 sets - 10 reps  - Standing Heel Raises  - 1 x daily - 7 x weekly - 3 sets - 10 reps  - Mini Squat  - 1 x daily - 7 x weekly - 3 sets - 10 reps    Goals:  Active       Dizzy       Patient will demonstrate understanding of HEP to augment PT POC and continue with management of dizziness after discharge        Start:  10/18/24    Expected End:  12/13/24            Patient will score 16 or less on DHI to allow for improved ease with navigating his community safely       Start:  10/18/24    Expected End:  12/13/24            Patient will demonstrate improved B foot clearance during gait to allow for ability to ambulate without his walker for return to PLOF       Start:  10/18/24    Expected End:  12/13/24            Patient will perform TUG in 24 seconds or less to allow for reduced risk of falls       Start:  10/18/24    Expected End:  11/15/24               "

## 2024-11-11 ENCOUNTER — TREATMENT (OUTPATIENT)
Dept: PHYSICAL THERAPY | Facility: CLINIC | Age: 74
End: 2024-11-11
Payer: MEDICARE

## 2024-11-11 DIAGNOSIS — R42 DIZZINESS: ICD-10-CM

## 2024-11-11 DIAGNOSIS — R26.9 ABNORMALITY OF GAIT: ICD-10-CM

## 2024-11-11 PROCEDURE — 97112 NEUROMUSCULAR REEDUCATION: CPT | Mod: GP,CQ

## 2024-11-11 PROCEDURE — 97110 THERAPEUTIC EXERCISES: CPT | Mod: GP,CQ

## 2024-11-11 ASSESSMENT — PAIN - FUNCTIONAL ASSESSMENT: PAIN_FUNCTIONAL_ASSESSMENT: 0-10

## 2024-11-11 ASSESSMENT — PAIN SCALES - GENERAL: PAINLEVEL_OUTOF10: 0 - NO PAIN

## 2024-11-11 NOTE — PROGRESS NOTES
"Physical Therapy Treatment    Patient Name: Bonifacio Hernandez  MRN: [id number]  Today's Date: 11/11/2024  Time Calculation  Start Time: 1130  Stop Time: 1212  Time Calculation (min): 42 min  PT Therapeutic Procedures Time Entry  Neuromuscular Re-Education Time Entry: 23  Therapeutic Exercise Time Entry: 17       Assessment:   Patient ambulating in clinic with no assistive device. Patient requires verbal cues to maintain tasks with patient demo'g good understanding. Patient challenged with retro high marches with tip toeing noted and verbal cues to correct. Patient demo's fatigue at end of session.     Plan:  Continue to work on improving strength and balance to be able to decrease risk of falls. -AB.     OP PT Plan  Treatment/Interventions: Education/ Instruction, Gait training, Manual therapy, Neuromuscular re-education, Self care/ home management, Therapeutic exercises  PT Plan: Skilled PT  PT Frequency: 2 times per week  Duration: 8 weeks  Onset Date: 10/07/24  Certification Period Start Date: 10/18/24  Certification Period End Date: 01/16/25  Rehab Potential: Good  Plan of Care Agreement: Patient    Current Problem  Problem List Items Addressed This Visit             ICD-10-CM    Dizziness R42    Abnormality of gait R26.9       Subjective   General  Reason for Referral: dizziness  Referred By: Natalie  Past Medical History Relevant to Rehab: B TKA, CVA 2022, back surgery 2018  General Comment: 6/16  Visit: 6  HEP: Yes   Patient states that he is doing his HEP. States that he's not having pain right now.     Precautions  Precautions  Precautions Comment: high fall risk; h/o CVA    Pain  Pain Assessment: 0-10  0-10 (Numeric) Pain Score: 0 - No pain    Objective     Treatments:  Therapeutic Exercise: 17 minutes, 1 units  SciFit x6'   Slantboard 2x1'   Step ups 6\" step 2x10 Bilat Fwd   Lateral Step Ups  6\" step 15x each   Standing hip abd 2x10 B with lime grn band on Red Airex   Standing hip ext 2x10 B with lime grn band " "on Red Airex   Standing Hip Flex 2x10 B with grn lome band on Red Airex   Standing heel raises 2x10 B  on Red Airex   Mini squats x15     Neuromuscular Reeducation: 23 minutes, 2 units  Side stepping in // bars x4 laps with lime grn band   SLS 3x30\" Bilat 2 fingertip support   Tandem stance 3x30\" Bilat   Step taps on airex 3x30\"   High Marching gait fwd/bwd in // bars 3 laps   Tandem Gait FWD/BWD 3 laps in // bars (N)  Attempts at gait with head nods and rotation unable to coordinate. (N)  VOR x1  Horizontal /Vertical 30\"  x 3 each   Saccades  vertical /horizontal 30 \" x3 each     OP EDUCATION:   Access Code: [email or web address]  URL: [email or web address]  Date: 10/18/2024  Prepared by: Karen Vargas     Exercises  - Standing Hip Extension with Counter Support  - 1 x daily - 7 x weekly - 3 sets - 10 reps  - Standing Hip Abduction  - 1 x daily - 7 x weekly - 3 sets - 10 reps  - Standing Heel Raises  - 1 x daily - 7 x weekly - 3 sets - 10 reps  - Mini Squat  - 1 x daily - 7 x weekly - 3 sets - 10 reps    Goals:  Active       Dizzy       Patient will demonstrate understanding of HEP to augment PT POC and continue with management of dizziness after discharge        Start:  10/18/24    Expected End:  12/13/24            Patient will score 16 or less on DHI to allow for improved ease with navigating his community safely       Start:  10/18/24    Expected End:  12/13/24            Patient will demonstrate improved B foot clearance during gait to allow for ability to ambulate without his walker for return to PLOF       Start:  10/18/24    Expected End:  12/13/24            Patient will perform TUG in 24 seconds or less to allow for reduced risk of falls       Start:  10/18/24    Expected End:  11/15/24               "

## 2024-11-14 ENCOUNTER — APPOINTMENT (OUTPATIENT)
Age: 74
End: 2024-11-14
Payer: COMMERCIAL

## 2024-11-14 VITALS
SYSTOLIC BLOOD PRESSURE: 130 MMHG | HEART RATE: 90 BPM | DIASTOLIC BLOOD PRESSURE: 66 MMHG | HEIGHT: 70 IN | OXYGEN SATURATION: 98 % | WEIGHT: 183 LBS | BODY MASS INDEX: 26.2 KG/M2

## 2024-11-14 DIAGNOSIS — E78.2 MIXED HYPERLIPIDEMIA: ICD-10-CM

## 2024-11-14 DIAGNOSIS — I63.339 CEREBROVASCULAR ACCIDENT (CVA) DUE TO THROMBOSIS OF POSTERIOR CEREBRAL ARTERY, UNSPECIFIED BLOOD VESSEL LATERALITY (MULTI): ICD-10-CM

## 2024-11-14 DIAGNOSIS — K21.9 GASTROESOPHAGEAL REFLUX DISEASE WITHOUT ESOPHAGITIS: ICD-10-CM

## 2024-11-14 DIAGNOSIS — I10 BENIGN ESSENTIAL HYPERTENSION: Primary | ICD-10-CM

## 2024-11-14 PROCEDURE — 99214 OFFICE O/P EST MOD 30 MIN: CPT | Performed by: FAMILY MEDICINE

## 2024-11-14 PROCEDURE — 1157F ADVNC CARE PLAN IN RCRD: CPT | Performed by: FAMILY MEDICINE

## 2024-11-14 PROCEDURE — 3075F SYST BP GE 130 - 139MM HG: CPT | Performed by: FAMILY MEDICINE

## 2024-11-14 PROCEDURE — 1036F TOBACCO NON-USER: CPT | Performed by: FAMILY MEDICINE

## 2024-11-14 PROCEDURE — 1159F MED LIST DOCD IN RCRD: CPT | Performed by: FAMILY MEDICINE

## 2024-11-14 PROCEDURE — 1160F RVW MEDS BY RX/DR IN RCRD: CPT | Performed by: FAMILY MEDICINE

## 2024-11-14 PROCEDURE — 3078F DIAST BP <80 MM HG: CPT | Performed by: FAMILY MEDICINE

## 2024-11-14 RX ORDER — HYDROCHLOROTHIAZIDE 12.5 MG/1
12.5 TABLET ORAL DAILY
Qty: 90 TABLET | Refills: 3 | Status: SHIPPED | OUTPATIENT
Start: 2024-11-14 | End: 2025-11-14

## 2024-11-14 NOTE — PROGRESS NOTES
"Subjective   Patient ID: Bonifacio Hernandez is a 74 y.o. male who presents for Follow-up (1 mo).    HPI   [facility] the [facility] with no new but numerous lold inflarcts, no other important illnesses or injuries.  Had tkr 1 yr ago  HTN-Takes and tolerates meds without side effects. No alcohol. no tobacco. no exercise. low salt.  Reviewed recommendation for 150 minutes of exercise per week including 2 days of weight training if over age 50. We had stopoped hydrochlorothiazide and losartan and home bp x=140/70. Will resume hydrochlorothiazide 12.5  CVA- no residual  GERD-Takes PPI daily with no breakthrough symptoms.  Reviewed dietary, caffeine, tobacco, alcohol, and NSAID avoidance. No dyspepsia, dysphagia, reflux, melena, or abdominal pain.  Hyperlipidemia- is on a statin and a prudent diet.    Review of Systems  General-no fatigue weight to within 10 pounds  ENT no problems with vision swallowing  Cardiac no chest pains palpitations change in exercise tolerance or capacity  Pulmonary no cough shortness of breath  GI no heartburn or abdominal pain  Musculoskeletal no joint pains    Objective   /66   Pulse 90   Ht 1.778 m (5' 10\")   Wt 83 kg (183 lb)   SpO2 98%   BMI 26.26 kg/m²     Physical Exam  General:  Alert, No acute distress. Appears stated age  Eye:  Pupils are equal, round and reactive to light, Extraocular movements are intact, Normal conjunctiva.    Neck:  Supple, Non-tender, No carotid bruit, No jugular venous distention, No lymphadenopathy, No thyromegaly.    Respiratory:  Lungs are clear to auscultation, Respirations are non-labored, Breath sounds are equal.    Cardiovascular:  Normal rate, Regular rhythm, No murmur.    Gastrointestinal:  Soft, Non-tender, No organomegaly. No solid or pulsatile mass  Integumentary:  Warm, Dry. No concerning lesions on exposed areas  Neurologic:  Alert, Oriented.  Gross and fine motor intact, CN 2-12 " intact  Psychiatric:  Cooperative, Appropriate mood & affect.    Assessment/Plan   Problem List Items Addressed This Visit             ICD-10-CM    Acid reflux disease K21.9    Relevant Orders    Follow Up In Primary Care    Benign essential hypertension - Primary I10    Relevant Medications    hydroCHLOROthiazide (Microzide) 12.5 mg tablet    Other Relevant Orders    Follow Up In Primary Care    CVA (cerebral vascular accident) (Multi) I63.9    Relevant Orders    Follow Up In Primary Care    Hyperlipidemia E78.5    Relevant Orders    Follow Up In Primary Care

## 2024-12-11 NOTE — PROGRESS NOTES
"Subjective   Reason for Visit: Bonifacio Hernandez is an 73 y.o. male here for a Medicare Wellness visit.     Past Medical, Surgical, and Family History reviewed and updated in chart.    Reviewed all medications by prescribing practitioner or clinical pharmacist (such as prescriptions, OTCs, herbal therapies and supplements) and documented in the medical record.    HPI  Had rtkr and considering watchman procedure.  Atrial fibrillation- paroxysmal/rate controlled continues on anticoagulation.  Mood is good, sertraline. Is helpful. Anxiety is much reduced.  HTN-Takes and tolerates meds without side effects. No alcohol. no tobacco. no exercise. low salt.  Reviewed recommendation for 150 minutes of exercise per week including 2 days of weight training if over age 50  Hyperlipidemia- is on a statin and a prudent diet.  GERD-Takes PPI daily with very rare breakthrough symptoms.  Reviewed dietary, caffeine, tobacco, alcohol, and NSAID avoidance. No dyspepsia, dysphagia, reflux, melena, or abdominal pain.  CVA- residual sx are minor, feels that sx have resolved.  Patient Care Team:  Carlos Estrada MD as PCP - General  Carlos Estrada MD as PCP - [facility] Attributed Provider     Review of Systems    Objective   Vitals:  /60   Pulse 74   Ht 1.778 m (5' 10\")   Wt 81.1 kg (178 lb 14.4 oz)   SpO2 96%   BMI 25.67 kg/m²       Physical Exam  nsr  Assessment/Plan   Problem List Items Addressed This Visit       Acid reflux disease    Relevant Orders    Follow Up In Primary Care    Afib (CMS/HCC)    Relevant Orders    Follow Up In Primary Care    Benign essential hypertension    Relevant Orders    Follow Up In Primary Care    CVA (cerebral vascular accident) (CMS/HCC)    Relevant Orders    Follow Up In Primary Care    Hyperlipidemia    Relevant Orders    Follow Up In Primary Care     Other Visit Diagnoses       Routine general medical examination at health care facility    -  Primary    Relevant Orders    Follow Up In " Received referral to offer pt support services. Called pt, introduced self and support services. Discussed recommended treatment and concerns around treatment. Provided active listening and validated feelings. Pt is exploring her options and second opinions for her treatment plan. Pt will call if more support is needed.   Primary Care    Anxiety        Relevant Orders    Follow Up In Primary Care             Patient was identified as a fall risk. Risk prevention instructions provided.

## 2025-01-23 ENCOUNTER — APPOINTMENT (OUTPATIENT)
Age: 75
End: 2025-01-23
Payer: MEDICARE

## 2025-01-30 ENCOUNTER — HOSPITAL ENCOUNTER (OUTPATIENT)
Dept: CARDIOLOGY | Facility: CLINIC | Age: 75
Discharge: HOME | End: 2025-01-30
Payer: MEDICARE

## 2025-01-30 DIAGNOSIS — Z95.818 IMPLANTABLE LOOP RECORDER PRESENT: ICD-10-CM

## 2025-01-30 DIAGNOSIS — I48.0 PAROXYSMAL ATRIAL FIBRILLATION (MULTI): ICD-10-CM

## 2025-01-30 PROCEDURE — 93298 REM INTERROG DEV EVAL SCRMS: CPT

## 2025-01-31 ENCOUNTER — TELEPHONE (OUTPATIENT)
Age: 75
End: 2025-01-31
Payer: MEDICARE

## 2025-01-31 DIAGNOSIS — M54.50 LEFT LOW BACK PAIN, UNSPECIFIED CHRONICITY, UNSPECIFIED WHETHER SCIATICA PRESENT: ICD-10-CM

## 2025-02-18 ENCOUNTER — TELEPHONE (OUTPATIENT)
Age: 75
End: 2025-02-18
Payer: MEDICARE

## 2025-02-18 DIAGNOSIS — M54.50 LOW BACK PAIN, UNSPECIFIED BACK PAIN LATERALITY, UNSPECIFIED CHRONICITY, UNSPECIFIED WHETHER SCIATICA PRESENT: ICD-10-CM

## 2025-02-18 DIAGNOSIS — M54.50 LEFT LOW BACK PAIN, UNSPECIFIED CHRONICITY, UNSPECIFIED WHETHER SCIATICA PRESENT: ICD-10-CM

## 2025-02-18 NOTE — TELEPHONE ENCOUNTER
Per PM. Xrays to be done.     LMOM for patient to return call. Not sure if he is scheduled w/ Pain management yet but they are requesting images. No current images of lower back so xrays were ordered.

## 2025-02-21 ENCOUNTER — HOSPITAL ENCOUNTER (OUTPATIENT)
Dept: RADIOLOGY | Facility: HOSPITAL | Age: 75
Discharge: HOME | End: 2025-02-21
Payer: MEDICARE

## 2025-02-21 DIAGNOSIS — M54.50 LOW BACK PAIN, UNSPECIFIED BACK PAIN LATERALITY, UNSPECIFIED CHRONICITY, UNSPECIFIED WHETHER SCIATICA PRESENT: ICD-10-CM

## 2025-02-21 PROCEDURE — 72110 X-RAY EXAM L-2 SPINE 4/>VWS: CPT

## 2025-03-03 ENCOUNTER — HOSPITAL ENCOUNTER (OUTPATIENT)
Dept: CARDIOLOGY | Facility: CLINIC | Age: 75
Discharge: HOME | End: 2025-03-03
Payer: MEDICARE

## 2025-03-03 DIAGNOSIS — I48.0 PAROXYSMAL ATRIAL FIBRILLATION (MULTI): ICD-10-CM

## 2025-03-03 DIAGNOSIS — Z95.818 IMPLANTABLE LOOP RECORDER PRESENT: ICD-10-CM

## 2025-03-03 PROCEDURE — 93298 REM INTERROG DEV EVAL SCRMS: CPT

## 2025-03-05 ENCOUNTER — OFFICE VISIT (OUTPATIENT)
Dept: URGENT CARE | Facility: CLINIC | Age: 75
End: 2025-03-05
Payer: MEDICARE

## 2025-03-05 VITALS
WEIGHT: 178 LBS | RESPIRATION RATE: 14 BRPM | TEMPERATURE: 98 F | HEART RATE: 78 BPM | BODY MASS INDEX: 25.48 KG/M2 | HEIGHT: 70 IN | OXYGEN SATURATION: 97 % | SYSTOLIC BLOOD PRESSURE: 149 MMHG | DIASTOLIC BLOOD PRESSURE: 83 MMHG

## 2025-03-05 DIAGNOSIS — J01.00 ACUTE NON-RECURRENT MAXILLARY SINUSITIS: Primary | ICD-10-CM

## 2025-03-05 PROCEDURE — 99213 OFFICE O/P EST LOW 20 MIN: CPT | Performed by: NURSE PRACTITIONER

## 2025-03-05 RX ORDER — AMOXICILLIN 875 MG/1
875 TABLET, FILM COATED ORAL 2 TIMES DAILY
Qty: 14 TABLET | Refills: 0 | Status: SHIPPED | OUTPATIENT
Start: 2025-03-05 | End: 2025-03-12

## 2025-03-05 NOTE — PROGRESS NOTES
74 y.o. male patient presents for evaluation of sinus pressure. Patient reports 1 week of progressively worsening maxillary sinus pain, nasal congestion, and headache. There is reported mild postnasal drip with associated cough. No fever, rashes, n/v/d, ear pains, headache, dizziness, chest pains, SOB or other constitutional signs and symptoms. Symptoms have been refractory to over-the-counter medications.      Vitals:    03/05/25 1454   BP: 149/83   Pulse: 78   Resp: 14   Temp: 36.7 °C (98 °F)   SpO2: 97%       Allergies   Allergen Reactions    Bee Pollen Other and Runny nose    Adhesive Tape-Silicones Other and Rash     EKG adhesive    Lisinopril Cough and Other     cough       Medication Documentation Review Audit       Reviewed by PAT Rueda (Nurse Practitioner) on 03/05/25 at 1503      Medication Order Taking? Sig Documenting Provider Last Dose Status   aspirin 81 mg chewable tablet [id number] Yes Chew 1 tablet (81 mg) once daily. Manda Durham MD  Active   atorvastatin (Lipitor) 40 mg tablet [id number] Yes Take 1 tablet (40 mg) by mouth once daily. Carlos Estrada MD  Active   esomeprazole (NexIUM) 20 mg DR capsule [id number] Yes Take 1 capsule (20 mg) by mouth once daily. Do not open capsule. Historical Provider, MD  Active   fluticasone (Flonase) 50 mcg/actuation nasal spray [id number] Yes Administer 1 spray into each nostril once daily as needed for rhinitis. Shake gently. Before first use, prime pump. After use, clean tip and replace cap. Historical Provider, MD  Active   hydroCHLOROthiazide (Microzide) 12.5 mg tablet [id number] Yes Take 1 tablet (12.5 mg) by mouth once daily. Carlos Estrada MD  Active   ketoconazole (NIZOral) 2 % cream [id number] Yes Apply 1 Application topically 2 times a day. Tory Provider, MD  Active   ketoconazole (NIZOral) 2 % shampoo [id number] Yes Apply 1 Application topically every other day. Historical Provider, MD  Active   latanoprost (Xalatan) 0.005 %  ophthalmic solution [id number] Yes Administer 1 drop into both eyes once daily at bedtime. Historical Provider, MD  Active   sertraline (Zoloft) 50 mg tablet [id number] Yes Take 1 tablet (50 mg) by mouth once daily. Carlos Estrada MD  Active                    Past Medical History:   Diagnosis Date    Arthritis     GERD (gastroesophageal reflux disease)     Glaucoma     Hyperlipidemia     Ischemic stroke (Multi)     Personal history of other diseases of the circulatory system 09/02/2021    History of cardiac arrhythmia       Past Surgical History:   Procedure Laterality Date    CARDIAC CATHETERIZATION N/A 01/23/2024    Procedure: LAAO (Left Atrial Appendage Occlusion);  Surgeon: Bj Robles MD;  Location: [address] Cardiac Cath Lab;  Service: Cardiovascular;  Laterality: N/A;  Same day CT at 1200    OTHER SURGICAL HISTORY  09/16/2019    Varicose vein ligation    OTHER SURGICAL HISTORY  09/16/2019    Inguinal hernia repair    OTHER SURGICAL HISTORY  09/16/2019    Esophagogastroduodenoscopy    OTHER SURGICAL HISTORY  10/15/2019    Back surgery    OTHER SURGICAL HISTORY  08/28/2020    Colonoscopy    OTHER SURGICAL HISTORY  01/2023    Loop monitor    OTHER SURGICAL HISTORY      watchman    TOTAL KNEE ARTHROPLASTY Right 11/28/2023    TOTAL KNEE ARTHROPLASTY Left 2014       ROS  See HPI    Physical Exam  Vitals and nursing note reviewed.   Constitutional:       Appearance: He is ill-appearing (mildly).   HENT:      Head: Normocephalic and atraumatic.      Right Ear: Tympanic membrane, ear canal and external ear normal.      Left Ear: Tympanic membrane, ear canal and external ear normal.      Nose: Congestion present.      Mouth/Throat:      Mouth: Mucous membranes are moist.      Pharynx: Oropharynx is clear.   Eyes:      Extraocular Movements: Extraocular movements intact.      Conjunctiva/sclera: Conjunctivae normal.      Pupils: Pupils are equal, round, and reactive to light.   Cardiovascular:      Rate and  Rhythm: Normal rate.   Pulmonary:      Effort: Pulmonary effort is normal.      Breath sounds: Normal breath sounds.   Skin:     General: Skin is warm.   Neurological:      General: No focal deficit present.      Mental Status: He is alert and oriented to person, place, and time.   Psychiatric:         Mood and Affect: Mood normal.         Behavior: Behavior normal.           Assessment/Plan/MDM  Bonifacio was seen today for uri.  Diagnoses and all orders for this visit:  Acute non-recurrent maxillary sinusitis (Primary)  -     amoxicillin (Amoxil) 875 mg tablet; Take 1 tablet (875 mg) by mouth 2 times a day for 7 days.    Encouraged pt to use otc cold remedies PRN, push PO fluids and rest. Patient's clinical presentation is otherwise unremarkable at this time. Patient is discharged with instructions to follow-up with primary care or seek emergency medical attention for worsening symptoms or any new concerns.    I did personally review Bonifacio's past medical history, surgical history, social history, as well as family history (when relevant).  In this case, I also oversaw the his drug management by reviewing his medication list, allergy list, as well as the medications that I prescribed during the UC course and/or recommended as an out-patient (including possible OTC medications such as acetaminophen, NSAIDs , etc).    After reviewing the items above, I did look at previous medical documentation, such as recent hospitalizations, office visits, and/or recent consultations with PCP/specialist.                          SDOH:   Another factor that I considered in Bonifacio's care was his Social Determinants of Health (SDOH). During this UC encounter, he did not have social determinants of health. Those SDOH influencing Bonifacio's care are: none      Horace Juarez CNP  [facility]  [phone]

## 2025-03-06 DIAGNOSIS — E78.2 MIXED HYPERLIPIDEMIA: ICD-10-CM

## 2025-04-14 RX ORDER — ATORVASTATIN CALCIUM 40 MG/1
40 TABLET, FILM COATED ORAL DAILY
Qty: 90 TABLET | Refills: 3 | OUTPATIENT
Start: 2025-04-14

## 2025-04-15 ENCOUNTER — APPOINTMENT (OUTPATIENT)
Dept: CARDIOLOGY | Facility: CLINIC | Age: 75
End: 2025-04-15
Payer: MEDICARE

## 2025-04-15 VITALS
OXYGEN SATURATION: 95 % | SYSTOLIC BLOOD PRESSURE: 132 MMHG | WEIGHT: 185.8 LBS | HEART RATE: 73 BPM | HEIGHT: 70 IN | DIASTOLIC BLOOD PRESSURE: 74 MMHG | BODY MASS INDEX: 26.6 KG/M2

## 2025-04-15 DIAGNOSIS — I48.20 CHRONIC ATRIAL FIBRILLATION (MULTI): ICD-10-CM

## 2025-04-15 DIAGNOSIS — I63.9 IMPENDING CEREBROVASCULAR ACCIDENT (MULTI): ICD-10-CM

## 2025-04-15 DIAGNOSIS — I48.0 PAROXYSMAL ATRIAL FIBRILLATION (MULTI): Primary | ICD-10-CM

## 2025-04-15 DIAGNOSIS — R42 DIZZINESS: ICD-10-CM

## 2025-04-15 DIAGNOSIS — Z95.818 PRESENCE OF WATCHMAN LEFT ATRIAL APPENDAGE CLOSURE DEVICE: ICD-10-CM

## 2025-04-15 DIAGNOSIS — Z91.81 AT HIGH RISK FOR INJURY RELATED TO FALL: ICD-10-CM

## 2025-04-15 DIAGNOSIS — Z95.818 IMPLANTABLE LOOP RECORDER PRESENT: ICD-10-CM

## 2025-04-15 DIAGNOSIS — Z09 POSTOP CHECK: ICD-10-CM

## 2025-04-15 DIAGNOSIS — Z01.810 ENCOUNTER FOR PREPROCEDURAL CARDIOVASCULAR EXAMINATION: ICD-10-CM

## 2025-04-15 DIAGNOSIS — I48.91 ATRIAL FIBRILLATION, UNSPECIFIED TYPE (MULTI): ICD-10-CM

## 2025-04-15 PROCEDURE — 1160F RVW MEDS BY RX/DR IN RCRD: CPT | Performed by: STUDENT IN AN ORGANIZED HEALTH CARE EDUCATION/TRAINING PROGRAM

## 2025-04-15 PROCEDURE — 99214 OFFICE O/P EST MOD 30 MIN: CPT | Performed by: STUDENT IN AN ORGANIZED HEALTH CARE EDUCATION/TRAINING PROGRAM

## 2025-04-15 PROCEDURE — 1157F ADVNC CARE PLAN IN RCRD: CPT | Performed by: STUDENT IN AN ORGANIZED HEALTH CARE EDUCATION/TRAINING PROGRAM

## 2025-04-15 PROCEDURE — 3075F SYST BP GE 130 - 139MM HG: CPT | Performed by: STUDENT IN AN ORGANIZED HEALTH CARE EDUCATION/TRAINING PROGRAM

## 2025-04-15 PROCEDURE — 3078F DIAST BP <80 MM HG: CPT | Performed by: STUDENT IN AN ORGANIZED HEALTH CARE EDUCATION/TRAINING PROGRAM

## 2025-04-15 PROCEDURE — 1159F MED LIST DOCD IN RCRD: CPT | Performed by: STUDENT IN AN ORGANIZED HEALTH CARE EDUCATION/TRAINING PROGRAM

## 2025-04-15 PROCEDURE — 1036F TOBACCO NON-USER: CPT | Performed by: STUDENT IN AN ORGANIZED HEALTH CARE EDUCATION/TRAINING PROGRAM

## 2025-04-15 NOTE — PROGRESS NOTES
Chief Complaint   Patient presents with    Follow-up     Discuss loop; pt has watchaman, EKG done 10/7/24     HPI:  I was requested by Dr. Estrada to evaluate this patient in consultation for cardiac assessment.    Mr. Bonifacio Hernandez is a 74 y.o. 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 Patient still has a loop recorder. He is asymptomatic from heart standpoint. He denies chest pain, shortness of breath, palpitations, leg edema, lightheadedness, headaches, fever, chills, orthopnea, paroxysmal nocturnal dyspnea or syncope.      Past Medical History  Past Medical History:   Diagnosis Date    Arthritis     GERD (gastroesophageal reflux disease)     Glaucoma     Hyperlipidemia     Ischemic stroke (Multi)     Personal history of other diseases of the circulatory system 09/02/2021    History of cardiac arrhythmia       Past Surgical History  Past Surgical History:   Procedure Laterality Date    CARDIAC CATHETERIZATION N/A 01/23/2024    Procedure: LAAO (Left Atrial Appendage Occlusion);  Surgeon: Bj Robles MD;  Location: [address] Cardiac Cath Lab;  Service: Cardiovascular;  Laterality: N/A;  Same day CT at 1200    OTHER SURGICAL HISTORY  09/16/2019    Varicose vein ligation    OTHER SURGICAL HISTORY  09/16/2019    Inguinal hernia repair    OTHER SURGICAL HISTORY  09/16/2019    Esophagogastroduodenoscopy    OTHER SURGICAL HISTORY  10/15/2019    Back surgery    OTHER SURGICAL HISTORY  08/28/2020    Colonoscopy    OTHER SURGICAL HISTORY  01/2023    Loop monitor    OTHER SURGICAL HISTORY      watchman    TOTAL KNEE ARTHROPLASTY Right 11/28/2023    TOTAL KNEE ARTHROPLASTY Left 2014       Past Family History  Family  "History   Problem Relation Name Age of Onset    Emphysema Father      Hypertension Sister      Pulmonary embolism Sister         Allergy History  Allergies   Allergen Reactions    Bee Pollen Other and Runny nose    Adhesive Tape-Silicones Other and Rash     EKG adhesive    Lisinopril Cough and Other     cough       Past Social History  Social History     Socioeconomic History    Marital status:    Tobacco Use    Smoking status: Never    Smokeless tobacco: Never   Vaping Use    Vaping status: Never Used   Substance and Sexual Activity    Alcohol use: Never    Drug use: Never    Sexual activity: Defer     Social Drivers of Health     Financial Resource Strain: Low Risk  (10/9/2024)    Overall Financial Resource Strain (CARDIA)     Difficulty of Paying Living Expenses: Not hard at all   Transportation Needs: No Transportation Needs (10/9/2024)    PRAPARE - Transportation     Lack of Transportation (Medical): No     Lack of Transportation (Non-Medical): No   Housing Stability: Low Risk  (10/9/2024)    Housing Stability Vital Sign     Unable to Pay for Housing in the Last Year: No     Number of Times Moved in the Last Year: 0     Homeless in the Last Year: No       Social History     Tobacco Use   Smoking Status Never   Smokeless Tobacco Never       Review of Systems:  A total of 12 systems have been reviewed and are negative except for the aforementioned findings described in HPI.     Objective Data:  Last Recorded Vitals:  Vitals:    04/15/25 1402   BP: 132/74   Pulse: 73   SpO2: 95%   Weight: 84.3 kg (185 lb 12.8 oz)   Height: 1.778 m (5' 10\")       Last Labs:  CBC - 10/8/2024:  4:27 AM  9.9 15.0 215    43.2      CMP - 10/16/2024: 12:09 PM  9.5 6.4 20 --- 1.0   3.4 3.9 22 48      PTT - 10/7/2024: 12:27 PM  1.1   13.0 42     TROPHS   Date/Time Value Ref Range Status   10/07/2024 12:27 PM 8 0 - 20 ng/L Final   10/08/2023 06:26 PM 6 0 - 20 ng/L Final   10/08/2023 05:20 PM 6 0 - 20 ng/L Final     HGBA1C   Date/Time " Value Ref Range Status   11/01/2022 04:48 AM 5.6 % Final     Comment:          Diagnosis of Diabetes-Adults   Non-Diabetic: < or = 5.6%   Increased risk for developing diabetes: 5.7-6.4%   Diagnostic of diabetes: > or = 6.5%  .       Monitoring of Diabetes                Age (y)     Therapeutic Goal (%)   Adults:          >18           <7.0   Pediatrics:    13-18           <7.5                   7-12           <8.0                   0- 6            7.5-8.5   American Diabetes Association. Diabetes Care 33(S1), Jan 2010.       LDLCALC   Date/Time Value Ref Range Status   01/09/2024 09:31 AM 69 <=99 mg/dL Final     Comment:                                 Near   Borderline      AGE      Desirable  Optimal    High     High     Very High     0-19 Y     0 - 109     ---    110-129   >/= 130     ----    20-24 Y     0 - 119     ---    120-159   >/= 160     ----      >24 Y     0 -  99   100-129  130-159   160-189     >/=190       VLDL   Date/Time Value Ref Range Status   01/09/2024 09:31 AM 11 0 - 40 mg/dL Final   11/01/2022 04:48 AM 13 0 - 40 mg/dL Final   02/28/2022 11:37 AM 16 0 - 40 mg/dL Final   02/23/2021 09:32 AM 17 0 - 40 mg/dL Final        Patient Medications:  Outpatient Encounter Medications as of 4/15/2025   Medication Sig Dispense Refill    aspirin 81 mg chewable tablet Chew 1 tablet (81 mg) once daily. 90 tablet 3    atorvastatin (Lipitor) 40 mg tablet Take 1 tablet (40 mg) by mouth once daily. 90 tablet 3    esomeprazole (NexIUM) 20 mg DR capsule Take 1 capsule (20 mg) by mouth once daily. Do not open capsule.      fluticasone (Flonase) 50 mcg/actuation nasal spray Administer 1 spray into each nostril once daily as needed for rhinitis. Shake gently. Before first use, prime pump. After use, clean tip and replace cap.      hydroCHLOROthiazide (Microzide) 12.5 mg tablet Take 1 tablet (12.5 mg) by mouth once daily. 90 tablet 3    ketoconazole (NIZOral) 2 % cream Apply 1 Application topically 2 times a day.       ketoconazole (NIZOral) 2 % shampoo Apply 1 Application topically every other day.      latanoprost (Xalatan) 0.005 % ophthalmic solution Administer 1 drop into both eyes once daily at bedtime.      sertraline (Zoloft) 50 mg tablet Take 1 tablet (50 mg) by mouth once daily. 30 tablet 11     No facility-administered encounter medications on file as of 4/15/2025.       Physical Exam:  General: alert, oriented and in no acute distress  HEENT: NC/AT; EOMI; PERRLA, external ear is normal  Neck: supple; trachea midline; no masses; no JVD  Chest: clear breath sounds bilaterally; no wheezing  Cardio: regular rhythm, S1S2 normal, no murmurs  Abdomen: Soft, non-tender, non-distension, no organomegaly  Extremities: no clubbing/cyanosis/edema  Neuro: Grossly intact     Psychiatric: Normal mood and affect     Past Cardiology Results (Last 3 Years):  EKG:  ECG 12 lead 10/07/2024      ECG 12 lead daily 01/23/2024    Echo:  Echo Results:  No results found for this or any previous visit from the past 365 days.     Cath:  No results found for this or any previous visit from the past 1095 days.    CV NCDR CATHPCI V5 COLLECTION FORM   Stress Test:  No results found for this or any previous visit from the past 1095 days.    Cardiac Imaging:  No results found for this or any previous visit from the past 1095 days.       Assessment/Plan     Mr. Bonifacio Hernandez is a 74 y.o. 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 Patient still has a loop recorder. He is asymptomatic from heart standpoint. He denies chest pain, shortness of breath, palpitations, leg edema, lightheadedness, headaches, fever, chills, orthopnea, paroxysmal nocturnal dyspnea or  syncope.      Assessment    # Paroxysmal Afib S/P LAAC with Watchman (01/2024)  - We had a thorough conversation about the importance of controlling risk factors for Afib such as weight loss, obesity, physical activity, avoid smoking, alcohol moderation, hypertension, and other comorbidities.  - Counseled to exercise for better conditioning, for losing weight and to lower the baseline heart rate.  - Keep ASA.  - Patient with loop recorder ~1yo. Will discuss scheduling extraction upon return.  - Follow up yearly.    # CVA  - Posterior MCA CVA without lasting neurological deficits October 2022.   - Follow up with Neurology team.     # Hypertension  - Controlled blood pressure.  - Keep current medications including hydrochlorothiazide 12.5mg daily.  - Patient counseled to keep a healthy lifestyle including regular exercise and low-sodium diet.  - Recommended home blood pressure monitoring.  - Goal of BP < 130/80mmHg.     # Hyperlipidemia  - Controlled by PCP.  - Keep home medication with statins - Atorvastatin 40 mg daily.  - Counseled on healthy diet and regular exercise.     We have discussed the most common side effects of the prescribed medications, indications, drug interactions, risks, complications, and alternatives of medications/therapeutics were explained and discussed. The patient has been requested to monitor closely for any untoward side effects or complications of medications. The patient has been strongly advised to be compliant with the recommendations, all the questions and concerns have been addressed. The patient has been also instructed to call, to return sooner or to go to the emergency department if symptoms persist or get worsen. The patient voiced understanding and denies any further questions at this time.     This note was transcribed using the Dragon Dictation system. There may be grammatical, punctuation, or verbiage errors that occur with voice recognition programs.    Counseling greater  than 50% of visit regarding all cardiac issues.    Thank you, Dr. Estrada, for allowing me to participate in the care of this patient. Please do not hesitate to contact me with any further questions or concerns.    Simón Weiss MD  Cardiology

## 2025-04-16 DIAGNOSIS — F41.9 ANXIETY: ICD-10-CM

## 2025-04-16 RX ORDER — SERTRALINE HYDROCHLORIDE 50 MG/1
50 TABLET, FILM COATED ORAL DAILY
Qty: 30 TABLET | Refills: 11 | Status: SHIPPED | OUTPATIENT
Start: 2025-04-16 | End: 2026-04-16

## 2025-04-16 RX ORDER — SERTRALINE HYDROCHLORIDE 50 MG/1
50 TABLET, FILM COATED ORAL DAILY
Qty: 90 TABLET | Refills: 11 | OUTPATIENT
Start: 2025-04-16

## 2025-05-13 LAB
ALBUMIN SERPL-MCNC: 4.2 G/DL (ref 3.6–5.1)
ALP SERPL-CCNC: 47 U/L (ref 35–144)
ALT SERPL-CCNC: 35 U/L (ref 9–46)
ANION GAP SERPL CALCULATED.4IONS-SCNC: 9 MMOL/L (CALC) (ref 7–17)
AST SERPL-CCNC: 27 U/L (ref 10–35)
BILIRUB SERPL-MCNC: 0.7 MG/DL (ref 0.2–1.2)
BUN SERPL-MCNC: 14 MG/DL (ref 7–25)
CALCIUM SERPL-MCNC: 9.3 MG/DL (ref 8.6–10.3)
CHLORIDE SERPL-SCNC: 101 MMOL/L (ref 98–110)
CHOLEST SERPL-MCNC: 132 MG/DL
CHOLEST/HDLC SERPL: 2.5 (CALC)
CO2 SERPL-SCNC: 31 MMOL/L (ref 20–32)
CREAT SERPL-MCNC: 0.75 MG/DL (ref 0.7–1.28)
EGFRCR SERPLBLD CKD-EPI 2021: 94 ML/MIN/1.73M2
ERYTHROCYTE [DISTWIDTH] IN BLOOD BY AUTOMATED COUNT: 12.3 % (ref 11–15)
GLUCOSE SERPL-MCNC: 87 MG/DL (ref 65–99)
HCT VFR BLD AUTO: 49 % (ref 38.5–50)
HDLC SERPL-MCNC: 53 MG/DL
HGB BLD-MCNC: 15.7 G/DL (ref 13.2–17.1)
LDLC SERPL CALC-MCNC: 66 MG/DL (CALC)
MCH RBC QN AUTO: 30.9 PG (ref 27–33)
MCHC RBC AUTO-ENTMCNC: 32 G/DL (ref 32–36)
MCV RBC AUTO: 96.5 FL (ref 80–100)
NONHDLC SERPL-MCNC: 79 MG/DL (CALC)
PLATELET # BLD AUTO: 231 THOUSAND/UL (ref 140–400)
PMV BLD REES-ECKER: 10.7 FL (ref 7.5–12.5)
POTASSIUM SERPL-SCNC: 4 MMOL/L (ref 3.5–5.3)
PROT SERPL-MCNC: 6.6 G/DL (ref 6.1–8.1)
RBC # BLD AUTO: 5.08 MILLION/UL (ref 4.2–5.8)
SODIUM SERPL-SCNC: 141 MMOL/L (ref 135–146)
TRIGL SERPL-MCNC: 51 MG/DL
WBC # BLD AUTO: 7.7 THOUSAND/UL (ref 3.8–10.8)

## 2025-05-15 ENCOUNTER — APPOINTMENT (OUTPATIENT)
Age: 75
End: 2025-05-15
Payer: MEDICARE

## 2025-05-15 VITALS
DIASTOLIC BLOOD PRESSURE: 70 MMHG | OXYGEN SATURATION: 96 % | WEIGHT: 186 LBS | BODY MASS INDEX: 26.69 KG/M2 | HEART RATE: 73 BPM | SYSTOLIC BLOOD PRESSURE: 132 MMHG

## 2025-05-15 DIAGNOSIS — K21.9 GASTROESOPHAGEAL REFLUX DISEASE WITHOUT ESOPHAGITIS: ICD-10-CM

## 2025-05-15 DIAGNOSIS — E78.2 MIXED HYPERLIPIDEMIA: ICD-10-CM

## 2025-05-15 DIAGNOSIS — I10 BENIGN ESSENTIAL HYPERTENSION: Primary | ICD-10-CM

## 2025-05-15 DIAGNOSIS — I63.339 CEREBROVASCULAR ACCIDENT (CVA) DUE TO THROMBOSIS OF POSTERIOR CEREBRAL ARTERY, UNSPECIFIED BLOOD VESSEL LATERALITY (MULTI): ICD-10-CM

## 2025-05-15 DIAGNOSIS — I48.91 ATRIAL FIBRILLATION, UNSPECIFIED TYPE (MULTI): ICD-10-CM

## 2025-05-15 PROCEDURE — 3075F SYST BP GE 130 - 139MM HG: CPT | Performed by: FAMILY MEDICINE

## 2025-05-15 PROCEDURE — 1036F TOBACCO NON-USER: CPT | Performed by: FAMILY MEDICINE

## 2025-05-15 PROCEDURE — 1160F RVW MEDS BY RX/DR IN RCRD: CPT | Performed by: FAMILY MEDICINE

## 2025-05-15 PROCEDURE — 3078F DIAST BP <80 MM HG: CPT | Performed by: FAMILY MEDICINE

## 2025-05-15 PROCEDURE — 99214 OFFICE O/P EST MOD 30 MIN: CPT | Performed by: FAMILY MEDICINE

## 2025-05-15 PROCEDURE — 1159F MED LIST DOCD IN RCRD: CPT | Performed by: FAMILY MEDICINE

## 2025-05-15 NOTE — PROGRESS NOTES
Subjective   Patient ID: Bonifacio Hernandez is a 75 y.o. male who presents for Follow-up (6 mo rev labs).    HPI   Since the last office visit there have been no interval operations, hospitalizations, important illnesses or injuries.  CVA stable no neurologic deficits.  History of A-fib had Watchman.  No anticoagulation prescribed  HTN-Takes and tolerates meds without side effects. No alcohol. no tobacco. no exercise. low salt.  Reviewed recommendation for 150 minutes of exercise per week including 2 days of weight training if over age 50  GERD-Takes PPI daily with no breakthrough symptoms.  Reviewed dietary, caffeine, tobacco, alcohol, and NSAID avoidance.  Hyperlipidemia- on statin and prudent diet  May 12 labs reviewed  Review of Systems  General-no fatigue weight to within 10 pounds  ENT no problems with vision swallowing  Cardiac no chest pains palpitations change in exercise tolerance or capacity  Pulmonary no cough shortness of breath  GI no heartburn or abdominal pain  Musculoskeletal no joint pains  Objective   /70   Pulse 73   Wt 84.4 kg (186 lb)   SpO2 96%   BMI 26.69 kg/m²     Physical Exam  General:  Alert, No acute distress. Appears stated age  Neck:  Supple, Non-tender, No carotid bruit, No jugular venous distention, No lymphadenopathy, No thyromegaly.    Respiratory:  Lungs are clear to auscultation, Respirations are non-labored, Breath sounds are equal.    Cardiovascular:  Normal rate, Regular rhythm, No murmur.    Gastrointestinal:  Soft, Non-tender, No organomegaly. No solid or pulsatile mass  Integumentary:  Warm, Dry. No concerning lesions on exposed areas  Neurologic:  Alert, Oriented.  Gross and fine motor intact, CN 2-12 intact  Psychiatric:  Cooperative, Appropriate mood & affect.  Assessment/Plan   Problem List Items Addressed This Visit           ICD-10-CM    Acid reflux disease K21.9    Benign essential hypertension - Primary I10    CVA (cerebral vascular accident) (Multi) I63.9     Hyperlipidemia E78.5    Atrial fibrillation, unspecified type (Multi) I48.91

## 2025-10-16 ENCOUNTER — APPOINTMENT (OUTPATIENT)
Dept: CARDIOLOGY | Facility: CLINIC | Age: 75
End: 2025-10-16
Payer: COMMERCIAL

## (undated) DEVICE — ACCESS KIT, S-MAK MINI, 4FR 10CM 0.018IN 40CM, NT/PT, ECHO ENHANCE NEEDLE

## (undated) DEVICE — VERSACROSS KIT, ACCESS SOLUTION, RF WIRE 180CM

## (undated) DEVICE — CLOSURE SYSTEM, VASCULAR, MVP 6-12FR, VENOUS

## (undated) DEVICE — SHEATH, PINNACLE, 10 CM,  8FR INTRODUCER, 8FR DIA, 2.5 CM DIALATOR

## (undated) DEVICE — GUIDEWIRE, INQWIRE, 3MM J, .035, 150

## (undated) DEVICE — CATHETER, ACUSON ACUNAV ULTRASOUND, 8FR 90CM

## (undated) DEVICE — SHEATH, PINNACLE, 10 CM,  7FR INTRODUCER, 7FR DIA, 2.5 CM DIALATOR

## (undated) DEVICE — DEVICE, CLOSURE, PERCLOSE, PROSTYLE

## (undated) DEVICE — CATHETER, ANGIO, IMPULSE, PIG 155, 6 FR X 110 CM

## (undated) DEVICE — SHEATH, PINNACLE, W/O GUIDEWIRE, 8FR INTRODUCER,  8FR DIA, 2.5 CM DILATOR